# Patient Record
Sex: FEMALE | Race: WHITE | NOT HISPANIC OR LATINO | Employment: OTHER | URBAN - METROPOLITAN AREA
[De-identification: names, ages, dates, MRNs, and addresses within clinical notes are randomized per-mention and may not be internally consistent; named-entity substitution may affect disease eponyms.]

---

## 2017-01-06 ENCOUNTER — TELEPHONE (OUTPATIENT)
Dept: FAMILY MEDICINE | Facility: CLINIC | Age: 77
End: 2017-01-06

## 2017-01-06 NOTE — TELEPHONE ENCOUNTER
----- Message from Lazara Hurst sent at 1/6/2017  9:09 AM CST -----  Has question about osteoporosis med's.

## 2017-01-25 PROBLEM — H25.12 AGE-RELATED NUCLEAR CATARACT OF LEFT EYE: Status: ACTIVE | Noted: 2017-01-25

## 2017-01-26 PROBLEM — H25.12 AGE-RELATED NUCLEAR CATARACT OF LEFT EYE: Status: RESOLVED | Noted: 2017-01-25 | Resolved: 2017-01-26

## 2017-08-18 ENCOUNTER — OFFICE VISIT (OUTPATIENT)
Dept: UROLOGY | Facility: CLINIC | Age: 77
End: 2017-08-18
Payer: MEDICARE

## 2017-08-18 VITALS
HEART RATE: 77 BPM | WEIGHT: 137 LBS | SYSTOLIC BLOOD PRESSURE: 137 MMHG | HEIGHT: 61 IN | BODY MASS INDEX: 25.86 KG/M2 | DIASTOLIC BLOOD PRESSURE: 71 MMHG

## 2017-08-18 DIAGNOSIS — N36.2 URETHRAL CARUNCLE: ICD-10-CM

## 2017-08-18 DIAGNOSIS — N95.2 ATROPHIC VAGINITIS: ICD-10-CM

## 2017-08-18 DIAGNOSIS — R35.1 NOCTURIA: ICD-10-CM

## 2017-08-18 PROCEDURE — 1159F MED LIST DOCD IN RCRD: CPT | Mod: ,,, | Performed by: UROLOGY

## 2017-08-18 PROCEDURE — 1126F AMNT PAIN NOTED NONE PRSNT: CPT | Mod: ,,, | Performed by: UROLOGY

## 2017-08-18 PROCEDURE — 99204 OFFICE O/P NEW MOD 45 MIN: CPT | Mod: S$PBB,,, | Performed by: UROLOGY

## 2017-08-18 PROCEDURE — 99213 OFFICE O/P EST LOW 20 MIN: CPT | Mod: PBBFAC,PN | Performed by: UROLOGY

## 2017-08-18 PROCEDURE — 99999 PR PBB SHADOW E&M-EST. PATIENT-LVL III: CPT | Mod: PBBFAC,,, | Performed by: UROLOGY

## 2017-08-18 RX ORDER — ESTRADIOL 0.1 MG/G
1 CREAM VAGINAL DAILY
Qty: 30 G | Refills: 11 | Status: SHIPPED | OUTPATIENT
Start: 2017-08-18 | End: 2018-03-01

## 2017-08-18 NOTE — PROGRESS NOTES
Subjective:       Patient ID: Joanne Daniel is a 77 y.o. female.    Chief Complaint: Other (urethra check)    Mrs. Daniel is a 77-year-old female with a history of a urethral lesion noted by her gynecologist.  The patient was referred by Dr. Labadie for evaluation and treatment of urethral caruncle.  The patient has no bleeding pain or obstruction with this lesion.  Patient has some urinary frequency and nocturia but no signs of infection or dysuria.      Other   This is a chronic (The patient was diagnosed with urethral caruncle and appears to have atrophic vaginitis possibly lichen sclerosis of the vestibule above the urethra.  This condition is most likely chronic and not acute.) problem. The current episode started more than 1 month ago. The problem occurs constantly. The problem has been unchanged. Associated symptoms include urinary symptoms. Pertinent negatives include no abdominal pain, anorexia, arthralgias, change in bowel habit, chest pain, chills, congestion, coughing, diaphoresis, fatigue, fever, headaches, joint swelling, myalgias, nausea, neck pain, numbness, rash, sore throat, swollen glands, vertigo, visual change, vomiting or weakness. Associated symptoms comments: The patient has mild dyspareunia secondary to vaginal dryness and atrophic vaginitis and uses vaginal lubricants for intercourse.. The symptoms are aggravated by intercourse. Treatments tried: Vaginal lubricants. The treatment provided moderate relief.     Review of Systems   Constitutional: Negative for activity change, appetite change, chills, diaphoresis, fatigue and fever.   HENT: Negative for congestion, ear pain, hearing loss, nosebleeds, sinus pressure, sore throat and trouble swallowing.    Eyes: Negative for pain and visual disturbance.   Respiratory: Negative for apnea, cough and shortness of breath.    Cardiovascular: Negative for chest pain and leg swelling.   Gastrointestinal: Negative for abdominal distention, abdominal  pain, anal bleeding, anorexia, blood in stool, change in bowel habit, constipation, diarrhea, nausea, rectal pain and vomiting.   Endocrine: Negative for cold intolerance, heat intolerance, polydipsia, polyphagia and polyuria.   Genitourinary: Positive for dyspareunia and frequency. Negative for decreased urine volume, difficulty urinating, dysuria, enuresis, flank pain, genital sores, hematuria, menstrual problem, pelvic pain, urgency, vaginal bleeding, vaginal discharge and vaginal pain.   Musculoskeletal: Negative for arthralgias, back pain, joint swelling, myalgias and neck pain.   Skin: Negative for color change, pallor and rash.   Allergic/Immunologic: Negative for environmental allergies, food allergies and immunocompromised state.   Neurological: Negative for dizziness, vertigo, speech difficulty, weakness, numbness and headaches.   Hematological: Negative for adenopathy. Does not bruise/bleed easily.   Psychiatric/Behavioral: Negative.        Objective:      Physical Exam   Nursing note and vitals reviewed.  Constitutional: She is oriented to person, place, and time. She appears well-developed and well-nourished.   HENT:   Head: Normocephalic.   Nose: Nose normal.   Mouth/Throat: Oropharynx is clear and moist.   Eyes: Conjunctivae and EOM are normal. Pupils are equal, round, and reactive to light.   Neck: Normal range of motion. Neck supple.   Cardiovascular: Normal rate, regular rhythm, normal heart sounds and intact distal pulses.    Pulmonary/Chest: Effort normal and breath sounds normal.   Abdominal: Soft. Bowel sounds are normal.   Genitourinary:   Genitourinary Comments: Vaginal atrophy and urethral caruncle.   Musculoskeletal: Normal range of motion.   Neurological: She is alert and oriented to person, place, and time. She has normal reflexes.   Skin: Skin is warm and dry.     Psychiatric: She has a normal mood and affect. Her behavior is normal. Judgment and thought content normal.       Assessment:        1. Urethral caruncle    2. Atrophic vaginitis    3. Nocturia        Plan:       Patient Instructions   Estrace cream to the vaginal vestibule and urethral carbuncle as well as small amount in the introitus nightly for 2 weeks and used every third night for 3 months.  Follow-up in 3 months for evaluation of therapy.  If patient develops bleeding pain signs of infection or urinary obstruction will need to return to the clinic for possible surgical intervention.

## 2017-08-18 NOTE — PATIENT INSTRUCTIONS
Estrace cream to the vaginal vestibule and urethral carbuncle as well as small amount in the introitus nightly for 2 weeks and used every third night for 3 months.  Follow-up in 3 months for evaluation of therapy.  If patient develops bleeding pain signs of infection or urinary obstruction will need to return to the clinic for possible surgical intervention.

## 2017-12-15 ENCOUNTER — OFFICE VISIT (OUTPATIENT)
Dept: UROLOGY | Facility: CLINIC | Age: 77
End: 2017-12-15
Payer: MEDICARE

## 2017-12-15 VITALS — WEIGHT: 137 LBS | BODY MASS INDEX: 25.86 KG/M2 | HEIGHT: 61 IN

## 2017-12-15 DIAGNOSIS — N95.2 ATROPHIC VAGINITIS: ICD-10-CM

## 2017-12-15 DIAGNOSIS — N36.2 URETHRAL CARUNCLE: Primary | ICD-10-CM

## 2017-12-15 DIAGNOSIS — R35.1 NOCTURIA: ICD-10-CM

## 2017-12-15 PROCEDURE — 99999 PR PBB SHADOW E&M-EST. PATIENT-LVL II: CPT | Mod: PBBFAC,,, | Performed by: UROLOGY

## 2017-12-15 PROCEDURE — 99212 OFFICE O/P EST SF 10 MIN: CPT | Mod: PBBFAC,PO | Performed by: UROLOGY

## 2017-12-15 PROCEDURE — 99213 OFFICE O/P EST LOW 20 MIN: CPT | Mod: S$PBB,,, | Performed by: UROLOGY

## 2017-12-15 NOTE — PROGRESS NOTES
Subjective:       Patient ID: Joanne Daniel is a 77 y.o. female.    Chief Complaint: Follow-up    78 yo WF with atrophic vaginitis, Urethral caruncle and nocturia. Doing well with vaginal estrogen. No Bleeding.      Other   This is a chronic (Atrophic Vaginitis.) problem. The current episode started more than 1 year ago. The problem occurs constantly. The problem has been gradually improving. Associated symptoms include urinary symptoms. Pertinent negatives include no abdominal pain, anorexia, arthralgias, change in bowel habit, chest pain, chills, congestion, coughing, diaphoresis, fatigue, fever, headaches, joint swelling, myalgias, nausea, neck pain, numbness, rash, sore throat, swollen glands, vertigo (nocturia x 1, frequency x 4), visual change, vomiting or weakness. Nothing aggravates the symptoms. Treatments tried: Estrogen cream. The treatment provided significant relief.     Review of Systems   Constitutional: Negative for activity change, appetite change, chills, diaphoresis, fatigue and fever.   HENT: Negative for congestion, ear pain, hearing loss, nosebleeds, sinus pressure, sore throat and trouble swallowing.    Eyes: Negative for pain and visual disturbance.   Respiratory: Negative for apnea, cough and shortness of breath.    Cardiovascular: Negative for chest pain and leg swelling.   Gastrointestinal: Negative for abdominal distention, abdominal pain, anal bleeding, anorexia, blood in stool, change in bowel habit, constipation, diarrhea, nausea, rectal pain and vomiting.   Endocrine: Negative for cold intolerance, heat intolerance, polydipsia, polyphagia and polyuria.   Genitourinary: Negative for decreased urine volume, difficulty urinating, dyspareunia, dysuria, enuresis, flank pain, frequency, genital sores, hematuria, menstrual problem, pelvic pain, urgency, vaginal bleeding, vaginal discharge and vaginal pain.   Musculoskeletal: Negative for arthralgias, back pain, joint swelling, myalgias and  neck pain.   Skin: Negative for color change, pallor and rash.   Allergic/Immunologic: Negative for environmental allergies, food allergies and immunocompromised state.   Neurological: Negative for dizziness, vertigo (nocturia x 1, frequency x 4), speech difficulty, weakness, numbness and headaches.   Hematological: Negative for adenopathy. Does not bruise/bleed easily.   Psychiatric/Behavioral: Negative.        Objective:      Physical Exam   Nursing note and vitals reviewed.  Constitutional: She is oriented to person, place, and time. She appears well-developed and well-nourished.   HENT:   Head: Normocephalic.   Nose: Nose normal.   Mouth/Throat: Oropharynx is clear and moist.   Eyes: Conjunctivae and EOM are normal. Pupils are equal, round, and reactive to light.   Neck: Normal range of motion. Neck supple.   Cardiovascular: Normal rate, regular rhythm, normal heart sounds and intact distal pulses.    Pulmonary/Chest: Effort normal and breath sounds normal.   Abdominal: Soft. Bowel sounds are normal.   Genitourinary:   Genitourinary Comments: Atrophic vaginitis improving and caruncle ~ 40% smaller   Musculoskeletal: Normal range of motion.   Neurological: She is alert and oriented to person, place, and time. She has normal reflexes.   Skin: Skin is warm and dry.     Psychiatric: She has a normal mood and affect. Her behavior is normal. Judgment and thought content normal.       Assessment:       1. Urethral caruncle    2. Nocturia    3. Atrophic vaginitis        Plan:       Patient Instructions   Continue Estrogen twice weekly to affected area.  F/U yearly and as needed

## 2018-01-16 ENCOUNTER — TELEPHONE (OUTPATIENT)
Dept: UROLOGY | Facility: CLINIC | Age: 78
End: 2018-01-16

## 2018-01-16 NOTE — TELEPHONE ENCOUNTER
----- Message from Naima Duff sent at 1/16/2018  9:58 AM CST -----  Contact: [phone]/self  Patient requesting to speak with you regarding the side effects of medication estradiol (ESTRACE) 0.01 % (0.1 mg/gram) vaginal cream. Please advise.

## 2018-01-16 NOTE — TELEPHONE ENCOUNTER
She has some side effects from the estrace cream and had to stop it. Would like to know if there is anything else for her to try

## 2018-03-01 ENCOUNTER — OFFICE VISIT (OUTPATIENT)
Dept: FAMILY MEDICINE | Facility: CLINIC | Age: 78
End: 2018-03-01
Payer: MEDICARE

## 2018-03-01 VITALS
OXYGEN SATURATION: 98 % | BODY MASS INDEX: 26.83 KG/M2 | HEIGHT: 61 IN | DIASTOLIC BLOOD PRESSURE: 70 MMHG | WEIGHT: 142.13 LBS | SYSTOLIC BLOOD PRESSURE: 134 MMHG | HEART RATE: 70 BPM

## 2018-03-01 DIAGNOSIS — N95.2 ATROPHIC VAGINITIS: ICD-10-CM

## 2018-03-01 DIAGNOSIS — N28.1 RENAL CYST: Primary | ICD-10-CM

## 2018-03-01 DIAGNOSIS — M21.372 LEFT FOOT DROP: ICD-10-CM

## 2018-03-01 PROBLEM — R35.1 NOCTURIA: Status: RESOLVED | Noted: 2017-08-18 | Resolved: 2018-03-01

## 2018-03-01 PROCEDURE — 99214 OFFICE O/P EST MOD 30 MIN: CPT | Mod: S$PBB,,,

## 2018-03-01 PROCEDURE — 99213 OFFICE O/P EST LOW 20 MIN: CPT | Mod: PBBFAC,PO

## 2018-03-01 PROCEDURE — 99999 PR PBB SHADOW E&M-EST. PATIENT-LVL III: CPT | Mod: PBBFAC,,,

## 2018-03-01 NOTE — PROGRESS NOTES
Subjective:       Patient ID: Joanne Daniel is a 77 y.o. female.    Chief Complaint: Flank Pain and Abdominal Pain    HPI Since January of 2017 she has had an MRI of the spine, EMG and Lumbar puncture at [facility] by a neurologist and PMR physician due to a foot drop. She has had a normal mammogram. She was evaluated by Dr. Jon who prescribed estrace for atrophic vaginitis. She had side effects and now is using vagisil. Her gyn told her she had a hernia. The patient is complaining of hardening of her abdomen. She has daily bowel movements which are mostly normal. Normal appetite. She was told she had a renal cyst discovered on an MRI performed for evaluation of her foot drop. She doesn't know what kidney if involved. 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      Review of Systems   Constitutional: Negative.  Negative for activity change, appetite change, chills, diaphoresis and fever.   HENT: Negative.  Negative for congestion, trouble swallowing and voice change.    Eyes: Negative.  Negative for visual disturbance.   Respiratory: Negative.    Cardiovascular: Negative.    Gastrointestinal: Positive for abdominal distention and abdominal pain. Negative for constipation, diarrhea, nausea and vomiting.        Right sided abdominal symptoms    Endocrine: Negative.    Genitourinary: Positive for flank pain. Negative for difficulty urinating, dyspareunia, dysuria, frequency, hematuria, urgency and vaginal pain.   Skin: Negative.    Allergic/Immunologic: Negative.    Neurological: Negative.         Foot drop    Hematological: Negative.    Psychiatric/Behavioral: Negative.    All other systems reviewed and are negative.      Objective:      Vitals:    03/01/18 0801   BP: 134/70   Pulse: 70     Physical Exam   Constitutional: She is oriented to person, place, and time. She appears well-developed and well-nourished. She is active.  Non-toxic appearance. She  does not have a sickly appearance. She does not appear ill. No distress.   Using a cane    HENT:   Head: Normocephalic and atraumatic.   Right Ear: External ear normal.   Left Ear: External ear normal.   Nose: Nose normal.   Hearing normal.    Eyes: Conjunctivae are normal. Pupils are equal, round, and reactive to light.   Neck: Normal range of motion. Neck supple. No thyroid mass and no thyromegaly present.   Cardiovascular: Normal rate, regular rhythm, normal heart sounds and intact distal pulses.  Exam reveals no gallop and no friction rub.    No murmur heard.  Pulses:       Dorsalis pedis pulses are 2+ on the right side, and 2+ on the left side.        Posterior tibial pulses are 2+ on the right side, and 2+ on the left side.   Pulmonary/Chest: Effort normal and breath sounds normal. No respiratory distress. She exhibits no tenderness.   Abdominal: Soft. She exhibits no distension and no mass. There is no tenderness. No hernia.   Musculoskeletal: Normal range of motion. She exhibits no edema.   Lymphadenopathy:     She has no cervical adenopathy.   Neurological: She is alert and oriented to person, place, and time. She has normal strength. Coordination and gait normal.   Skin: Skin is warm and dry. She is not diaphoretic. No pallor.   Psychiatric: She has a normal mood and affect. Her speech is normal and behavior is normal. Judgment and thought content normal. Cognition and memory are normal.   Vitals reviewed.      Assessment:       1. Renal cyst    2. Left foot drop    3. Atrophic vaginitis        Plan:       Renal cyst  -     US Retroperitoneal Complete (Kidney and; Future; Expected date: 03/01/2018    Left foot drop    Atrophic vaginitis      Follow-up if symptoms worsen or fail to improve.      I offered to obtain her records and arrange for a second opinion and she will think about this.

## 2018-03-07 ENCOUNTER — TELEPHONE (OUTPATIENT)
Dept: FAMILY MEDICINE | Facility: CLINIC | Age: 78
End: 2018-03-07

## 2018-03-07 NOTE — TELEPHONE ENCOUNTER
Notified pt. She will call if her problem worsen or fails to improve. She does not want to see specialist at this time. All questions answered.

## 2018-07-25 ENCOUNTER — HOSPITAL ENCOUNTER (OUTPATIENT)
Dept: RADIOLOGY | Facility: HOSPITAL | Age: 78
Discharge: HOME | End: 2018-07-25
Attending: ORTHOPAEDIC SURGERY
Payer: MEDICARE

## 2018-07-25 DIAGNOSIS — M79.671 BILATERAL FOOT PAIN: ICD-10-CM

## 2018-07-25 DIAGNOSIS — M79.672 BILATERAL FOOT PAIN: ICD-10-CM

## 2018-07-25 DIAGNOSIS — M25.562 BILATERAL KNEE PAIN: ICD-10-CM

## 2018-07-25 DIAGNOSIS — M25.561 BILATERAL KNEE PAIN: ICD-10-CM

## 2018-07-25 PROCEDURE — 73564 X-RAY EXAM KNEE 4 OR MORE: CPT | Mod: 26,50,, | Performed by: RADIOLOGY

## 2018-07-25 PROCEDURE — 73630 X-RAY EXAM OF FOOT: CPT | Mod: 50,TC

## 2018-07-25 PROCEDURE — 73630 X-RAY EXAM OF FOOT: CPT | Mod: 26,50,, | Performed by: RADIOLOGY

## 2018-07-25 PROCEDURE — 73564 X-RAY EXAM KNEE 4 OR MORE: CPT | Mod: 50,TC

## 2018-09-14 ENCOUNTER — OFFICE VISIT (OUTPATIENT)
Dept: FAMILY MEDICINE | Facility: CLINIC | Age: 78
End: 2018-09-14
Payer: MEDICARE

## 2018-09-14 VITALS
HEART RATE: 68 BPM | HEIGHT: 61 IN | WEIGHT: 136.19 LBS | SYSTOLIC BLOOD PRESSURE: 118 MMHG | DIASTOLIC BLOOD PRESSURE: 66 MMHG | TEMPERATURE: 98 F | BODY MASS INDEX: 25.71 KG/M2

## 2018-09-14 DIAGNOSIS — M15.9 PRIMARY OSTEOARTHRITIS INVOLVING MULTIPLE JOINTS: ICD-10-CM

## 2018-09-14 DIAGNOSIS — R53.83 FATIGUE, UNSPECIFIED TYPE: ICD-10-CM

## 2018-09-14 DIAGNOSIS — R09.81 SINUS CONGESTION: ICD-10-CM

## 2018-09-14 DIAGNOSIS — H92.09 EAR ACHE: Primary | ICD-10-CM

## 2018-09-14 DIAGNOSIS — E78.00 HYPERCHOLESTEROLEMIA: ICD-10-CM

## 2018-09-14 PROBLEM — M15.0 PRIMARY OSTEOARTHRITIS INVOLVING MULTIPLE JOINTS: Status: ACTIVE | Noted: 2018-09-14

## 2018-09-14 PROCEDURE — 99999 PR PBB SHADOW E&M-EST. PATIENT-LVL III: CPT | Mod: PBBFAC,,, | Performed by: INTERNAL MEDICINE

## 2018-09-14 PROCEDURE — 99214 OFFICE O/P EST MOD 30 MIN: CPT | Mod: S$PBB,,, | Performed by: INTERNAL MEDICINE

## 2018-09-14 PROCEDURE — 99213 OFFICE O/P EST LOW 20 MIN: CPT | Mod: PBBFAC,PN,25 | Performed by: INTERNAL MEDICINE

## 2018-09-14 PROCEDURE — 96372 THER/PROPH/DIAG INJ SC/IM: CPT | Mod: PBBFAC,PN

## 2018-09-14 RX ORDER — TRIAMCINOLONE ACETONIDE 40 MG/ML
40 INJECTION, SUSPENSION INTRA-ARTICULAR; INTRAMUSCULAR
Status: COMPLETED | OUTPATIENT
Start: 2018-09-14 | End: 2018-09-14

## 2018-09-14 RX ADMIN — TRIAMCINOLONE ACETONIDE 40 MG: 40 INJECTION, SUSPENSION INTRA-ARTICULAR; INTRAMUSCULAR at 10:09

## 2018-09-14 NOTE — PATIENT INSTRUCTIONS
We have reviewed your prescription medications.  I have ordered some labs to investigate your fatigue. I have ordered a steroid shot for sinus congestion, ear pain and joint stiffness. You have been given a steroid shot to help manage your symptoms. Possible side effects of this medication are sleeplessness, irritability, and increased hunger.

## 2018-09-17 NOTE — PROGRESS NOTES
Subjective:       Patient ID: Joanne Daniel is a 78 y.o. female.    Chief Complaint: Otalgia; Sore Throat; Jaw Pain (hurts to chew); Headache (lateral ); and Fatigue     I have reviewed the PMH,  and  for this patient. This is a new patient to me. This patient is a former patient of Dr. Issa who is here to establish care. She is here because of earache , sinus symptoms, and fatigue. She has been having sinus symptoms for a few weeksShe has no drainage but the back of her head hurts and she feels congested. She has not had labs sine 2016. She has been taking otc supplements for instaflex and vitamin B12.       Otalgia    There is pain in both ears. This is a chronic problem. The current episode started 1 to 4 weeks ago. The problem occurs constantly. The problem has been gradually worsening. There has been no fever. The pain is moderate. Associated symptoms include headaches, neck pain and rhinorrhea. Pertinent negatives include no abdominal pain, coughing, diarrhea, ear discharge, hearing loss, rash, sore throat or vomiting. Treatments tried: herbal supplements. The treatment provided mild relief.     Review of Systems   Constitutional: Negative for chills, fatigue and fever.   HENT: Positive for ear pain and rhinorrhea. Negative for congestion, ear discharge, hearing loss and sore throat.    Eyes: Negative for discharge, redness and itching.   Respiratory: Negative for cough, chest tightness, shortness of breath and wheezing.    Cardiovascular: Negative for chest pain, palpitations and leg swelling.   Gastrointestinal: Negative for abdominal pain, constipation, diarrhea, nausea and vomiting.   Endocrine: Negative for cold intolerance and heat intolerance.   Genitourinary: Negative for dysuria, flank pain, frequency and hematuria.   Musculoskeletal: Positive for neck pain. Negative for arthralgias, back pain, joint swelling and myalgias.   Skin: Negative for color change and rash.   Neurological: Positive  for headaches. Negative for dizziness, tremors and numbness.   Psychiatric/Behavioral: Negative for dysphoric mood and sleep disturbance. The patient is not nervous/anxious.        Objective:      Physical Exam   Constitutional: She appears well-developed and well-nourished.   HENT:   Head: Normocephalic and atraumatic.   Right Ear: External ear normal. Tympanic membrane is not erythematous. A middle ear effusion is present.   Left Ear: External ear normal. Tympanic membrane is not erythematous. A middle ear effusion is present.   Mouth/Throat: No posterior oropharyngeal edema or posterior oropharyngeal erythema. No tonsillar exudate.   Eyes: Conjunctivae and EOM are normal. Pupils are equal, round, and reactive to light.   Neck: No thyromegaly present.   Cardiovascular: Normal rate, regular rhythm, normal heart sounds and intact distal pulses.   No murmur heard.  Pulmonary/Chest: Effort normal and breath sounds normal. She has no wheezes.   Abdominal: She exhibits no distension. There is no tenderness.   Musculoskeletal: She exhibits no edema or tenderness.   Lymphadenopathy:     She has no cervical adenopathy.   Neurological: She displays normal reflexes. No cranial nerve deficit.   Skin: Skin is warm and dry. No rash noted.   Psychiatric: She has a normal mood and affect. Her behavior is normal.       Assessment and Plan:     Problem List Items Addressed This Visit     Hypercholesterolemia - She has not had cholesterol checked in years. We will check lipids.     Relevant Orders    Lipid panel (Completed)    Primary osteoarthritis involving multiple joints - steroid shot should help      Other Visit Diagnoses     Ear ache    -  Primary - probably due to congestion. Treat with steroid shot    Relevant Orders    CBC auto differential (Completed)    Sinus congestion    - treat with steroid shot.       Fatigue, unspecified type     - check labs.     Relevant Orders    Comprehensive metabolic panel (Completed)    TSH  (Completed)

## 2018-09-19 ENCOUNTER — TELEPHONE (OUTPATIENT)
Dept: FAMILY MEDICINE | Facility: CLINIC | Age: 78
End: 2018-09-19

## 2018-09-19 DIAGNOSIS — D75.839 THROMBOCYTOSIS: Primary | ICD-10-CM

## 2018-09-19 NOTE — TELEPHONE ENCOUNTER
----- Message from Sully Flowers sent at 9/19/2018  2:36 PM CDT -----  Contact: [phone]/self  Patient is calling to speak with you concerning lab results. Please call and advise.

## 2018-09-20 NOTE — TELEPHONE ENCOUNTER
Return call to pt no answer:  TSH was high, but free T4 was normal. We should repeat this test in about 3 months. Make appointment for follow-up. Cholesterol was a little high. Total was 203 and LDL was 132. Hdl was 52. The blood chemistries, kidney function test  and liver function tests were within normal range.CBC shows Hct is a little low at 37 and the platelets are very high at 513. I recommend that we refer her to hematology about high platelets. I will place order for referral.  Will try back tomorrow

## 2018-09-21 ENCOUNTER — TELEPHONE (OUTPATIENT)
Dept: FAMILY MEDICINE | Facility: CLINIC | Age: 78
End: 2018-09-21

## 2018-09-21 NOTE — TELEPHONE ENCOUNTER
----- Message from Madeleine Hahn MD sent at 9/19/2018  5:20 PM CDT -----  TSH was high, but free T4 was normal. We should repeat this test in about 3 months. Make appointment for follow-up. Cholesterol was a little high. Total was 203 and LDL was 132. Hdl was 52. The blood chemistries, kidney function test  and liver function tests were within normal range.CBC shows Hct is a little low at 37 and the platelets are very high at 513. I recommend that we refer her to hematology about high platelets. I will place order for referral.

## 2018-09-21 NOTE — TELEPHONE ENCOUNTER
----- Message from Latosha Nelson sent at 9/21/2018  1:33 PM CDT -----  Contact: self, [phone] (H)  Patient states her platelets were high and wants to know if she should be taking a baby aspirin.

## 2018-09-26 ENCOUNTER — TELEPHONE (OUTPATIENT)
Dept: FAMILY MEDICINE | Facility: CLINIC | Age: 78
End: 2018-09-26

## 2018-10-02 ENCOUNTER — OFFICE VISIT (OUTPATIENT)
Dept: HEMATOLOGY/ONCOLOGY | Facility: CLINIC | Age: 78
End: 2018-10-02
Payer: MEDICARE

## 2018-10-02 ENCOUNTER — LAB VISIT (OUTPATIENT)
Dept: LAB | Facility: HOSPITAL | Age: 78
End: 2018-10-02
Attending: INTERNAL MEDICINE
Payer: MEDICARE

## 2018-10-02 VITALS
HEIGHT: 61 IN | RESPIRATION RATE: 18 BRPM | HEART RATE: 75 BPM | OXYGEN SATURATION: 98 % | SYSTOLIC BLOOD PRESSURE: 161 MMHG | TEMPERATURE: 98 F | WEIGHT: 134.94 LBS | DIASTOLIC BLOOD PRESSURE: 71 MMHG | BODY MASS INDEX: 25.48 KG/M2

## 2018-10-02 DIAGNOSIS — D53.9 NUTRITIONAL ANEMIA: ICD-10-CM

## 2018-10-02 DIAGNOSIS — D64.9 NORMOCYTIC ANEMIA: ICD-10-CM

## 2018-10-02 DIAGNOSIS — D75.839 THROMBOCYTOSIS: ICD-10-CM

## 2018-10-02 DIAGNOSIS — D75.839 THROMBOCYTOSIS: Primary | ICD-10-CM

## 2018-10-02 LAB
BASOPHILS # BLD AUTO: 0.03 K/UL
BASOPHILS NFR BLD: 0.3 %
CRP SERPL-MCNC: 30.1 MG/L
DIFFERENTIAL METHOD: ABNORMAL
EOSINOPHIL # BLD AUTO: 0.2 K/UL
EOSINOPHIL NFR BLD: 2 %
ERYTHROCYTE [DISTWIDTH] IN BLOOD BY AUTOMATED COUNT: 13.1 %
ERYTHROCYTE [SEDIMENTATION RATE] IN BLOOD BY WESTERGREN METHOD: 115 MM/HR
FERRITIN SERPL-MCNC: 126 NG/ML
FOLATE SERPL-MCNC: 9 NG/ML
HCT VFR BLD AUTO: 37.4 %
HGB BLD-MCNC: 11.5 G/DL
IRON SERPL-MCNC: 38 UG/DL
LYMPHOCYTES # BLD AUTO: 2.8 K/UL
LYMPHOCYTES NFR BLD: 31.2 %
MCH RBC QN AUTO: 28.3 PG
MCHC RBC AUTO-ENTMCNC: 30.7 G/DL
MCV RBC AUTO: 92 FL
MONOCYTES # BLD AUTO: 0.8 K/UL
MONOCYTES NFR BLD: 9.1 %
NEUTROPHILS # BLD AUTO: 5.1 K/UL
NEUTROPHILS NFR BLD: 57.4 %
PATH REV BLD -IMP: NORMAL
PLATELET # BLD AUTO: 476 K/UL
PMV BLD AUTO: 9.3 FL
RBC # BLD AUTO: 4.06 M/UL
SATURATED IRON: 11 %
TOTAL IRON BINDING CAPACITY: 339 UG/DL
TRANSFERRIN SERPL-MCNC: 229 MG/DL
VIT B12 SERPL-MCNC: 1905 PG/ML
WBC # BLD AUTO: 8.91 K/UL

## 2018-10-02 PROCEDURE — 82728 ASSAY OF FERRITIN: CPT

## 2018-10-02 PROCEDURE — 85060 BLOOD SMEAR INTERPRETATION: CPT | Mod: ,,, | Performed by: PATHOLOGY

## 2018-10-02 PROCEDURE — 81219 CALR GENE COM VARIANTS: CPT

## 2018-10-02 PROCEDURE — 85652 RBC SED RATE AUTOMATED: CPT

## 2018-10-02 PROCEDURE — 36415 COLL VENOUS BLD VENIPUNCTURE: CPT

## 2018-10-02 PROCEDURE — 86140 C-REACTIVE PROTEIN: CPT

## 2018-10-02 PROCEDURE — 81270 JAK2 GENE: CPT

## 2018-10-02 PROCEDURE — 99215 OFFICE O/P EST HI 40 MIN: CPT | Mod: S$PBB,ICN,, | Performed by: INTERNAL MEDICINE

## 2018-10-02 PROCEDURE — 88271 CYTOGENETICS DNA PROBE: CPT

## 2018-10-02 PROCEDURE — 82746 ASSAY OF FOLIC ACID SERUM: CPT

## 2018-10-02 PROCEDURE — 85025 COMPLETE CBC W/AUTO DIFF WBC: CPT

## 2018-10-02 PROCEDURE — 99213 OFFICE O/P EST LOW 20 MIN: CPT | Mod: PBBFAC,PO | Performed by: INTERNAL MEDICINE

## 2018-10-02 PROCEDURE — 99999 PR PBB SHADOW E&M-EST. PATIENT-LVL III: CPT | Mod: PBBFAC,,, | Performed by: INTERNAL MEDICINE

## 2018-10-02 PROCEDURE — 81403 MOPATH PROCEDURE LEVEL 4: CPT

## 2018-10-02 PROCEDURE — 83540 ASSAY OF IRON: CPT

## 2018-10-02 PROCEDURE — 82607 VITAMIN B-12: CPT

## 2018-10-02 PROCEDURE — 88275 CYTOGENETICS 100-300: CPT

## 2018-10-02 NOTE — PROGRESS NOTES
PATIENT: Joanne Daniel  MRN: [id number]  DATE: 10/1/2018    Diagnosis:   1. Thrombocytosis    2. Normocytic anemia    3. Nutritional anemia       Chief Complaint: thrombocytosis    Subjective:    History of Present Illness: Ms. Daniel is a 78 y.o. female who presents for evaluation and management of thrombocytosis. She is referred by her family medicine physician Dr. Hahn. Routine labs on 9/15/18 revealed a mild normocytic anemia at 11.5 g/dL and an elevated platelet count at 513 k/uL. Her platelet count in November 2016 was normal at 321 k/uL.    Patient denies a history of thrombosis, vasomotor symptoms, or constitutional symptoms (unintentional weight loss, fatigue). She takes several supplements, including turmeric and gingko. She denies a history of cancer. In 2016, interestingly, she saw hematologist/oncologist Dr. Powers for a cancer workup evaluation. The workup was negative, but labwork revealed an elevated erythrocyte sedimentation rate.     She denies shortness of breath, chest pain, nausea, vomiting, diarrhea, constipation.    Past medical, surgical, family, and social histories have been reviewed and updated below.    Past Medical History:   Past Medical History:   Diagnosis Date    Foot drop, left foot     Glaucoma     Hyperlipidemia     Urinary tract infection     Vaginal infection        Past Surgical History:   Past Surgical History:   Procedure Laterality Date    EYE SURGERY Right     PHACOEMULSIFICATION-ASPIRATION-CATARACT Left 1/26/2017    Performed by Chris Manrique MD at [facility] OR       Family History:   Family History   Problem Relation Age of Onset    Heart disease Neg Hx     Cancer Neg Hx     Kidney disease Neg Hx        Social History:  reports that  has never smoked. she has never used smokeless tobacco. She reports that she does not drink alcohol or use drugs.    Allergies:  Review of patient's allergies indicates:  No Known Allergies    Medications:  Current Outpatient  "Medications   Medication Sig Dispense Refill    calcium-mag oxide-vitamin D3 185- mg-mg-unit Cap Take by mouth 3 (three) times daily.      thiamine (VITAMIN B-1) 100 MG tablet Take 100 mg by mouth once daily.       No current facility-administered medications for this visit.      Review of Systems   Constitutional: Negative for fatigue, fever and unexpected weight change.   HENT: Negative for sore throat.    Eyes: Negative for visual disturbance.   Respiratory: Negative for shortness of breath.    Cardiovascular: Negative for chest pain and leg swelling.   Gastrointestinal: Negative for abdominal distention, abdominal pain, constipation, diarrhea, nausea and vomiting.   Genitourinary: Negative for dysuria.   Musculoskeletal: Negative for back pain.   Skin: Negative for rash.   Neurological: Negative for headaches.   Hematological: Negative for adenopathy.   Psychiatric/Behavioral: The patient is not nervous/anxious.      ECOG Performance Status:   ECOG SCORE 0       Objective:      Vitals:   Vitals:    10/02/18 1019   BP: (!) 161/71   BP Location: Right arm   Patient Position: Sitting   Pulse: 75   Resp: 18   Temp: 98.1 °F (36.7 °C)   TempSrc: Oral   SpO2: 98%   Weight: 61.2 kg (134 lb 14.7 oz)   Height: 5' 1" (1.549 m)     BMI: Body mass index is 25.49 kg/m².    Physical Exam   Constitutional: She is oriented to person, place, and time. She appears well-developed and well-nourished. No distress.   HENT:   Head: Normocephalic and atraumatic.   Eyes: EOM are normal. Pupils are equal, round, and reactive to light.   Neck: Normal range of motion. Neck supple.   Cardiovascular: Normal rate and regular rhythm.   Pulmonary/Chest: Effort normal and breath sounds normal.   Abdominal: Soft. Bowel sounds are normal.   No splenomegaly appreciated on exam   Musculoskeletal: Normal range of motion. She exhibits no edema.   Neurological: She is alert and oriented to person, place, and time.   Skin: Skin is warm and dry. "   Psychiatric: She has a normal mood and affect. Her behavior is normal. Judgment and thought content normal.   Nursing note and vitals reviewed.    Laboratory Data:  Labs have been reviewed.          Assessment:       1. Thrombocytosis    2. Normocytic anemia    3. Nutritional anemia          Plan:     1. Thrombocytosis  - patient has one documented elevated platelet count. Unclear at this time what the exact etiology of her thrombocytosis is; however, I suspected that she has a secondary, reactive thrombocytosis. She had an elevated inflammatory marker (ESR) in 2016, so perhaps she has reactive thrombocytosis related to inflammation.  - I will check the following labs today: CBC, pathologist interpretation differential, ESR, CRP (to evaluate for inflammatory causes of thrombocytosis), ferritin, iron/TIBC (to evaluate for iron deficiency as a cause of thrombocytosis), BCR/ABL (to evaluate for chronic myeloid leukemia), JAK2 V617F with reflex testing (to evaluate for essential thrombocytosis)  - I will call her as the labs come back. The JAK2 and BCR/ABL testing will take a week or so to come back.    2. Normocytic anemia  - she has a mild normocytic anemia. I will evaluate for iron deficiency (which can cause a reactive thrombocytosis), anemia of chronic inflammation, and nutritional anemias (vitamin B12 and folate deficiency).    - return to clinic in 3 weeks to review all the results and discuss further actions.    Toney Mora M.D.  Hematology/Oncology  [facility] - [address], [address]  [place] [address]  Phone: [phone]  Fax: [phone]

## 2018-10-02 NOTE — LETTER
October 2, 2018      Madeleine Hahn MD  [address] [facility]  [address] [place] [id number]           [facility]  [address] [place] [id number]  Phone: [phone]          Patient: Joanne Daniel   MR Number: [id number]   YOB: 1940   Date of Visit: 10/2/2018       Dear Dr. Madeleine Hahn:    Thank you for referring Joanne Daniel to me for evaluation. Attached you will find relevant portions of my assessment and plan of care.    If you have questions, please do not hesitate to call me. I look forward to following Joanne Daniel along with you.    Sincerely,    Toney Mora MD    Enclosure  CC:  No Recipients    If you would like to receive this communication electronically, please contact [email or web address] or [phone] to request more information on [employer] Link access.    For providers and/or their staff who would like to refer a patient to [facility], please contact us through our one-stop-shop provider referral line, Johnathan Terrazas, at [phone].    If you feel you have received this communication in error or would no longer like to receive these types of communications, please e-mail [email or web address]

## 2018-10-03 ENCOUNTER — TELEPHONE (OUTPATIENT)
Dept: HEMATOLOGY/ONCOLOGY | Facility: CLINIC | Age: 78
End: 2018-10-03

## 2018-10-03 LAB — PATH REV BLD -IMP: NORMAL

## 2018-10-03 NOTE — TELEPHONE ENCOUNTER
----- Message from Meagan Wilson sent at 10/3/2018  2:00 PM CDT -----  Contact: Self [phone]  Patient is calling to talk to nurse in regards to her test results.

## 2018-10-03 NOTE — TELEPHONE ENCOUNTER
I called Ms. Daniel. I left a voicemail. 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 I do not have a great answer as to why her ESR and CRP are this elevated. But, I think this explains her elevated platelet count and mild anemia.    I will await the results of BCR/ABL and JAK2 V617F (with reflex testing) labs. I will see her in clinic in a few weeks to go over all the results.    Toney Mora M.D.  Hematology/Oncology  [facility] - [address], [address]  [place] [address]  Phone: [phone]  Fax: [phone]

## 2018-10-03 NOTE — TELEPHONE ENCOUNTER
I called and gave her the results of her labs. She has chronic inflammation that is causing a secondary thrombocytosis and mild anemia of chronic inflammation.    The BCR/ABL and MPN testing are pending.     I recommended that she keep her follow-up appointment with me, so we can review all labs after they come back.    Patient is in agreement with the proposed treatment plan. All questions were answered to the patient's satisfaction.    Toney Mora M.D.  Hematology/Oncology  [facility] - [address], [address]  [place] [address]  Phone: [phone]  Fax: [phone]

## 2018-10-05 ENCOUNTER — OFFICE VISIT (OUTPATIENT)
Dept: FAMILY MEDICINE | Facility: CLINIC | Age: 78
End: 2018-10-05
Payer: MEDICARE

## 2018-10-05 VITALS
TEMPERATURE: 98 F | DIASTOLIC BLOOD PRESSURE: 78 MMHG | HEIGHT: 61 IN | SYSTOLIC BLOOD PRESSURE: 130 MMHG | WEIGHT: 134.81 LBS | BODY MASS INDEX: 25.45 KG/M2 | HEART RATE: 70 BPM | RESPIRATION RATE: 16 BRPM | OXYGEN SATURATION: 97 %

## 2018-10-05 DIAGNOSIS — R22.1 NECK MASS: Primary | ICD-10-CM

## 2018-10-05 DIAGNOSIS — E03.8 SUBCLINICAL HYPOTHYROIDISM: ICD-10-CM

## 2018-10-05 DIAGNOSIS — D50.9 IRON DEFICIENCY ANEMIA, UNSPECIFIED IRON DEFICIENCY ANEMIA TYPE: ICD-10-CM

## 2018-10-05 DIAGNOSIS — D75.839 THROMBOCYTOSIS: ICD-10-CM

## 2018-10-05 DIAGNOSIS — J01.90 ACUTE SINUSITIS, RECURRENCE NOT SPECIFIED, UNSPECIFIED LOCATION: ICD-10-CM

## 2018-10-05 PROCEDURE — 99214 OFFICE O/P EST MOD 30 MIN: CPT | Mod: PBBFAC,PN | Performed by: INTERNAL MEDICINE

## 2018-10-05 PROCEDURE — 99999 PR PBB SHADOW E&M-EST. PATIENT-LVL IV: CPT | Mod: PBBFAC,,, | Performed by: INTERNAL MEDICINE

## 2018-10-05 PROCEDURE — 99214 OFFICE O/P EST MOD 30 MIN: CPT | Mod: S$PBB,,, | Performed by: INTERNAL MEDICINE

## 2018-10-05 RX ORDER — AZITHROMYCIN 250 MG/1
250 TABLET, FILM COATED ORAL DAILY
Qty: 6 TABLET | Refills: 0 | Status: SHIPPED | OUTPATIENT
Start: 2018-10-05 | End: 2020-01-17

## 2018-10-05 RX ORDER — LEVOTHYROXINE SODIUM 25 UG/1
25 TABLET ORAL DAILY
Qty: 30 TABLET | Refills: 2 | Status: SHIPPED | OUTPATIENT
Start: 2018-10-05 | End: 2019-01-04 | Stop reason: SDUPTHER

## 2018-10-05 NOTE — PATIENT INSTRUCTIONS
We will get an ultrasound to evaluate the knots in your neck. We will start you on thyroid medicine because you are having symptoms of hypothyroidism and your tsh was elevated. Follow-up with hematology as recommended about platelets. We will treat you for a possible sinus infection also.

## 2018-10-08 LAB
CLINICAL CYTOGENETICIST REVIEW: NORMAL
MBCR DISCLAIMER: NORMAL
MBCR REASON FOR REFERRAL: NORMAL
MBCR RELEASED BY: NORMAL
MBCR RESULT SUMMARY: NORMAL
MBCR RESULT TABLE: NORMAL
MBCR SPECIMEN: NORMAL
REF LAB TEST METHOD: NORMAL
SERVICE CMNT-IMP: NORMAL
SPECIMEN SOURCE: NORMAL
T(9;22)(ABL1,BCR) BLD/T FISH: NORMAL

## 2018-10-08 NOTE — PROGRESS NOTES
Subjective:       Patient ID: Joanne Daniel is a 78 y.o. female.    Chief Complaint: Throat (lump in throat right side  when she swollow off and on x 4 weeks) and Ear (right ear slight pain off and on)     I have reviewed the PMH,  and  for this patient. She is here for follow-up of lumps on the side of her neck and sore throat. She was seen last week and treated with a steroid shot. She states that she felt better for a few days, but then she got worse again. Her daughter is here with her and assists with history. She had been having apain in her scalp and itching and these things got better. She has been seeing hematology about abnormal platelets. She reports that the soreness in her neck is related to swallowing. She has been having symptoms of fatigue and constipation. Her TSH was abnormal last time, but her free T4 was normal. She has been having LUQ pain.       Review of Systems   Constitutional: Negative for chills, fatigue and fever.   HENT: Positive for sore throat and trouble swallowing. Negative for congestion, ear discharge, ear pain, postnasal drip, rhinorrhea and sinus pressure.    Eyes: Negative for discharge, redness and itching.   Respiratory: Negative for cough, chest tightness, shortness of breath and wheezing.    Cardiovascular: Negative for chest pain, palpitations and leg swelling.   Gastrointestinal: Positive for abdominal pain. Negative for constipation, diarrhea, nausea and vomiting.   Endocrine: Negative for cold intolerance and heat intolerance.   Genitourinary: Negative for dysuria, flank pain, frequency and hematuria.   Musculoskeletal: Negative for arthralgias, back pain, joint swelling and myalgias.   Skin: Negative for color change and rash.   Neurological: Negative for dizziness, tremors, numbness and headaches.   Psychiatric/Behavioral: Negative for dysphoric mood and sleep disturbance. The patient is not nervous/anxious.        Objective:      Physical Exam   Constitutional:  She appears well-developed and well-nourished.   HENT:   Head: Normocephalic and atraumatic.   Right Ear: External ear normal. Tympanic membrane is not erythematous. A middle ear effusion is present.   Left Ear: External ear normal. Tympanic membrane is not erythematous.  No middle ear effusion.   Mouth/Throat: No posterior oropharyngeal edema or posterior oropharyngeal erythema. No tonsillar exudate.   Eyes: Conjunctivae and EOM are normal. Pupils are equal, round, and reactive to light.   Neck: No thyromegaly present.   Cardiovascular: Normal rate, regular rhythm, normal heart sounds and intact distal pulses.   No murmur heard.  Pulmonary/Chest: Effort normal and breath sounds normal. She has no wheezes.   Abdominal: She exhibits no distension. There is no tenderness.   Musculoskeletal: She exhibits no edema or tenderness.   Lymphadenopathy:     She has cervical adenopathy.   Neurological: She displays normal reflexes. No cranial nerve deficit.   Skin: Skin is warm and dry. No rash noted.   Psychiatric: She has a normal mood and affect. Her behavior is normal.       Assessment and Plan:     Problem List Items Addressed This Visit     Thrombocytosis - follow-up with hematology as scheduled. Stable.      Iron deficiency anemia - take iron pills otc.       Subclinical hypothyroidism - we will start her on low-dose synthroid for symptomatic subclinical hypothyroidism.       Neck mass - Primary - we will get thyroid ultrasound.     Relevant Orders    US Soft Tissue Head Neck Thyroid      Other Visit Diagnoses     Acute sinusitis, recurrence not specified, unspecified location    - treat with zpak.

## 2018-10-09 LAB
MPNR  FINAL DIAGNOSIS: NORMAL
MPNR  SPECIMEN TYPE: NORMAL
MPNR RESULT: NORMAL

## 2018-10-11 ENCOUNTER — TELEPHONE (OUTPATIENT)
Dept: FAMILY MEDICINE | Facility: CLINIC | Age: 78
End: 2018-10-11

## 2018-10-11 NOTE — TELEPHONE ENCOUNTER
She was treated with a zpak. You only take it for 5 days, but it works for 10 days. She should continue to see a benefit over the next week. She can gargle with salt water  Several times a day and take throat lozenges for sore throat. Her thyroid ultrasound did show 2 small nodules, but they were not large enough to biopsy. This should be repeated next year.

## 2018-10-11 NOTE — TELEPHONE ENCOUNTER
----- Message from Kelley Lua sent at 10/11/2018  1:06 PM CDT -----  Contact: [phone]  Patient requested to speak with the nurse about her throat pain. Please call.

## 2018-10-11 NOTE — TELEPHONE ENCOUNTER
"Pt was seen this past Friday, took five days of antibiotic, she still has a sore throat, offered pt to come in & she asked why "what will the doctor do for my throat", pt did not schedule an appt, and she wants her US results.   "

## 2018-10-11 NOTE — TELEPHONE ENCOUNTER
----- Message from Sully Flowers sent at 10/11/2018 10:01 AM CDT -----  Contact: [phone]/Self  Patient requesting to speak with you concerning throat difficulties and inability to swallow comfortably. Please call and advise

## 2018-10-16 ENCOUNTER — TELEPHONE (OUTPATIENT)
Dept: FAMILY MEDICINE | Facility: CLINIC | Age: 78
End: 2018-10-16

## 2018-10-16 NOTE — TELEPHONE ENCOUNTER
----- Message from Meagan Wilson sent at 10/16/2018 10:05 AM CDT -----  Contact: Self [phone]  Patient is calling to talk to nurse in regards to her appointment on 10/26/18. Please advice

## 2018-10-22 PROBLEM — D75.838 REACTIVE THROMBOCYTOSIS: Status: ACTIVE | Noted: 2018-10-02

## 2018-10-22 PROBLEM — D63.8 ANEMIA OF CHRONIC DISEASE: Status: ACTIVE | Noted: 2018-10-22

## 2018-10-22 NOTE — PROGRESS NOTES
PATIENT: Joanne Daniel  MRN: [id number]  DATE: 10/22/2018    Diagnosis:   1. Anemia of chronic disease    2. Reactive thrombocytosis    3. Advance care planning      Chief Complaint: anemia of chronic disease    Subjective:    History of Present Illness: Ms. Daniel is a 78 y.o. female who presented in October 2018 for evaluation and management of thrombocytosis. She was referred by her family medicine physician Dr. Hahn. Routine labs on 9/15/18 revealed a mild normocytic anemia at 11.5 g/dL and an elevated platelet count at 513 k/uL. Her platelet count in November 2016 was normal at 321 k/uL.    Patient denies a history of thrombosis, vasomotor symptoms, or constitutional symptoms (unintentional weight loss, fatigue). She takes several supplements, including turmeric and gingko. She denies a history of cancer. In 2016, interestingly, she saw hematologist/oncologist Dr. Powers for a cancer workup evaluation. The workup was negative, but labwork revealed an elevated erythrocyte sedimentation rate.     Interval history:   - she presents for a follow-up appointment for her thrombocytosis.  - Overall, she is doing well. She complains of fatigue and morning stiffness. She denies shortness of breath, chest pain, nausea, vomiting, diarrhea, constipation.    Past medical, surgical, family, and social histories have been reviewed and updated below.    Past Medical History:   Past Medical History:   Diagnosis Date    Foot drop, left foot     Glaucoma     Hyperlipidemia     Urinary tract infection     Vaginal infection        Past Surgical History:   Past Surgical History:   Procedure Laterality Date    EYE SURGERY Right     PHACOEMULSIFICATION-ASPIRATION-CATARACT Left 1/26/2017    Performed by Chris Manrique MD at [facility] OR       Family History:   Family History   Problem Relation Age of Onset    Heart disease Neg Hx     Cancer Neg Hx     Kidney disease Neg Hx        Social History:  reports that  has never smoked.  she has never used smokeless tobacco. She reports that she does not drink alcohol or use drugs.    Allergies:  Review of patient's allergies indicates:  No Known Allergies    Medications:  Current Outpatient Medications   Medication Sig Dispense Refill    azithromycin (Z-DIANNE) 250 MG tablet Take 1 tablet (250 mg total) by mouth once daily. 2 pills po x 1 day then 1 pill a day orally x 4 days 6 tablet 0    calcium-mag oxide-vitamin D3 185- mg-mg-unit Cap Take by mouth 3 (three) times daily.      levothyroxine (SYNTHROID) 25 MCG tablet Take 1 tablet (25 mcg total) by mouth once daily. 30 tablet 2    thiamine (VITAMIN B-1) 100 MG tablet Take 100 mg by mouth once daily.       No current facility-administered medications for this visit.      Review of Systems   Constitutional: Positive for fatigue. Negative for fever and unexpected weight change.   HENT: Negative for sore throat.    Eyes: Negative for visual disturbance.   Respiratory: Negative for shortness of breath.    Cardiovascular: Negative for chest pain and leg swelling.   Gastrointestinal: Negative for abdominal distention, abdominal pain, constipation, diarrhea, nausea and vomiting.   Genitourinary: Negative for dysuria.   Musculoskeletal: Negative for back pain.        Morning stiffness   Skin: Negative for rash.   Neurological: Negative for headaches.   Hematological: Negative for adenopathy.   Psychiatric/Behavioral: The patient is not nervous/anxious.      ECOG Performance Status:   ECOG SCORE 0       Objective:      Vitals:   Vitals:    10/23/18 1024   BP: (!) 158/71   BP Location: Left arm   Patient Position: Sitting   Pulse: 78   Resp: 17   Temp: 98.6 °F (37 °C)   TempSrc: Oral   SpO2: 95%   Weight: 60.7 kg (133 lb 13.1 oz)     BMI: Body mass index is 25.28 kg/m².    Physical Exam   Constitutional: She is oriented to person, place, and time. She appears well-developed and well-nourished. No distress.   HENT:   Head: Normocephalic and atraumatic.    Eyes: EOM are normal. Pupils are equal, round, and reactive to light.   Neck: Normal range of motion. Neck supple.   Cardiovascular: Normal rate and regular rhythm.   Pulmonary/Chest: Effort normal and breath sounds normal.   Abdominal: Soft. Bowel sounds are normal.   No splenomegaly appreciated on exam   Musculoskeletal: Normal range of motion. She exhibits no edema.   Neurological: She is alert and oriented to person, place, and time.   Skin: Skin is warm and dry.   Psychiatric: She has a normal mood and affect. Her behavior is normal. Judgment and thought content normal.   Nursing note and vitals reviewed.    Laboratory Data:  Labs have been reviewed.              Assessment:       1. Anemia of chronic disease    2. Reactive thrombocytosis    3. Advance care planning         Plan:     1. Anemia of chronic disease/inflammation, secondary thrombocytosis  - Labs have been reviewed.  - Workup for primary thrombocytosis (BCR/ABL, JAK2 with reflex testing) was negative.  - although saturated iron is low, her ferritin was within acceptable limits. Inflammatory markers ESR and CRP were elevated. These findings are consistent with the diagnosis of anemia of chronic disease/inflammation with secondary thrombocytosis.  - at this time, she does not need further workup.     2. Elevated inflammatory markers / possible inflammatory arthritis  - she notes moderate fatigue and morning stiffness. Her arthritis improves by the end of the day.  - given her symptoms and elevated inflammatory markers, I will perform a laboratory workup for rheumatologic conditions such as rheumatoid arthritis.  - if any of these tests are positive, I would recommend referral to rheumatology for further workup.    - return to clinic as needed.    Toney Mora M.D.  Hematology/Oncology  [facility] - [address], [address]  [place] [address]  Phone: [phone]  Fax: [phone]

## 2018-10-23 ENCOUNTER — OFFICE VISIT (OUTPATIENT)
Dept: HEMATOLOGY/ONCOLOGY | Facility: CLINIC | Age: 78
End: 2018-10-23
Payer: MEDICARE

## 2018-10-23 ENCOUNTER — LAB VISIT (OUTPATIENT)
Dept: LAB | Facility: HOSPITAL | Age: 78
End: 2018-10-23
Attending: INTERNAL MEDICINE
Payer: MEDICARE

## 2018-10-23 VITALS
BODY MASS INDEX: 25.28 KG/M2 | HEART RATE: 78 BPM | DIASTOLIC BLOOD PRESSURE: 71 MMHG | TEMPERATURE: 99 F | RESPIRATION RATE: 17 BRPM | SYSTOLIC BLOOD PRESSURE: 158 MMHG | WEIGHT: 133.81 LBS | OXYGEN SATURATION: 95 %

## 2018-10-23 DIAGNOSIS — D75.838 REACTIVE THROMBOCYTOSIS: ICD-10-CM

## 2018-10-23 DIAGNOSIS — Z71.89 ADVANCE CARE PLANNING: ICD-10-CM

## 2018-10-23 DIAGNOSIS — M19.90 INFLAMMATORY ARTHRITIS: ICD-10-CM

## 2018-10-23 DIAGNOSIS — D63.8 ANEMIA OF CHRONIC DISEASE: Primary | ICD-10-CM

## 2018-10-23 LAB — CCP AB SER IA-ACNC: <0.5 U/ML

## 2018-10-23 PROCEDURE — 99213 OFFICE O/P EST LOW 20 MIN: CPT | Mod: PBBFAC,PO | Performed by: INTERNAL MEDICINE

## 2018-10-23 PROCEDURE — 86431 RHEUMATOID FACTOR QUANT: CPT

## 2018-10-23 PROCEDURE — 86200 CCP ANTIBODY: CPT

## 2018-10-23 PROCEDURE — 86038 ANTINUCLEAR ANTIBODIES: CPT

## 2018-10-23 PROCEDURE — 99215 OFFICE O/P EST HI 40 MIN: CPT | Mod: S$PBB,,, | Performed by: INTERNAL MEDICINE

## 2018-10-23 PROCEDURE — 36415 COLL VENOUS BLD VENIPUNCTURE: CPT

## 2018-10-23 PROCEDURE — 99999 PR PBB SHADOW E&M-EST. PATIENT-LVL III: CPT | Mod: PBBFAC,,, | Performed by: INTERNAL MEDICINE

## 2018-10-24 LAB
ANA SER QL IF: NORMAL
RHEUMATOID FACT SERPL-ACNC: <10 IU/ML

## 2018-10-29 ENCOUNTER — TELEPHONE (OUTPATIENT)
Dept: HEMATOLOGY/ONCOLOGY | Facility: CLINIC | Age: 78
End: 2018-10-29

## 2018-10-29 NOTE — TELEPHONE ENCOUNTER
I called and left a voicemail. I told her that her autoimmune labs were negative. I still think she should see a rheumatologist given her arthritis and elevated inflammatory markers.    Toney Mora M.D.  Hematology/Oncology  [facility] - [address], [address]  [place] [address]  Phone: [phone]  Fax: [phone]

## 2018-11-02 ENCOUNTER — TELEPHONE (OUTPATIENT)
Dept: HEMATOLOGY/ONCOLOGY | Facility: CLINIC | Age: 78
End: 2018-11-02

## 2018-11-02 NOTE — TELEPHONE ENCOUNTER
Patient requesting a call from MD regarding test results.  Md notified.  ----- Message from Nia Estrada sent at 11/2/2018  4:28 PM CDT -----  Contact: self / [phone]  Patient is requesting a call back regarding, her test results. Please advise

## 2019-01-04 RX ORDER — LEVOTHYROXINE SODIUM 25 UG/1
TABLET ORAL
Qty: 30 TABLET | Refills: 2 | Status: SHIPPED | OUTPATIENT
Start: 2019-01-04 | End: 2019-04-01 | Stop reason: SDUPTHER

## 2019-02-08 ENCOUNTER — TELEPHONE (OUTPATIENT)
Dept: ADMINISTRATIVE | Facility: HOSPITAL | Age: 79
End: 2019-02-08

## 2019-02-18 ENCOUNTER — OFFICE VISIT (OUTPATIENT)
Dept: URGENT CARE | Facility: CLINIC | Age: 79
End: 2019-02-18
Payer: MEDICARE

## 2019-02-18 VITALS
TEMPERATURE: 99 F | BODY MASS INDEX: 25.11 KG/M2 | SYSTOLIC BLOOD PRESSURE: 109 MMHG | WEIGHT: 133 LBS | RESPIRATION RATE: 16 BRPM | OXYGEN SATURATION: 99 % | HEART RATE: 77 BPM | HEIGHT: 61 IN | DIASTOLIC BLOOD PRESSURE: 59 MMHG

## 2019-02-18 DIAGNOSIS — J10.1 INFLUENZA A: Primary | ICD-10-CM

## 2019-02-18 LAB
CTP QC/QA: YES
FLUAV AG NPH QL: POSITIVE
FLUBV AG NPH QL: NEGATIVE

## 2019-02-18 PROCEDURE — 87804 POCT INFLUENZA A/B: ICD-10-PCS | Mod: 59,QW,S$GLB, | Performed by: NURSE PRACTITIONER

## 2019-02-18 PROCEDURE — 99214 OFFICE O/P EST MOD 30 MIN: CPT | Mod: S$GLB,,, | Performed by: NURSE PRACTITIONER

## 2019-02-18 PROCEDURE — 87804 INFLUENZA ASSAY W/OPTIC: CPT | Mod: QW,S$GLB,, | Performed by: NURSE PRACTITIONER

## 2019-02-18 PROCEDURE — 99214 PR OFFICE/OUTPT VISIT, EST, LEVL IV, 30-39 MIN: ICD-10-PCS | Mod: S$GLB,,, | Performed by: NURSE PRACTITIONER

## 2019-02-18 RX ORDER — BENZONATATE 100 MG/1
100 CAPSULE ORAL EVERY 6 HOURS PRN
Qty: 30 CAPSULE | Refills: 1 | Status: SHIPPED | OUTPATIENT
Start: 2019-02-18 | End: 2020-01-17

## 2019-02-18 NOTE — PATIENT INSTRUCTIONS
"Please follow up with your Primary care provider within 2-5 days if your signs and symptoms have not resolved or worsen.  The usual course of cold symptoms are 10-14 days.     If your condition worsens or fails to improve we recommend that you receive another evaluation at the emergency room immediately or contact your primary medical clinic to discuss your concerns.     You must understand that you have received an Urgent Care treatment only and that you may be released before all of your medical problems are known or treated.   You, the patient, will arrange for follow up care as instructed.     Tylenol or Ibuprofen can also be used as directed for pain/fever unless you have an allergy to them or medical condition such as stomach ulcers, kidney or liver disease or blood thinners etc for which you should not be taking these type of medications.     Take over the counter cough medication as directed as needed for cough.  You should avoid medications with pseudoephedrine or phenylephrine (any medication with "D") if you have high blood pressure as this can cause an elevation in your blood pressure. Instead consider Corcidin HBP as needed to prevent an elevated blood pressure.     Natural remedies of symptoms (as needed) include humidification, saline nasal sprays, and/or steamy showers.  Increase fluids, warm tea with honey, cough drops as needed.  You may also use salt water gargles for sore throat.    IF you received a steroid shot today - As discussed, this can elevate your blood pressure, elevate your blood sugar, water weight gain, nervous energy, redness to the face and dimpling of the skin at the injection site.     The Flu (Influenza)     The virus that causes the flu spreads through the air in droplets when someone who has the flu coughs, sneezes, laughs, or talks.   The flu (influenza) is an infection that affects your respiratory tract. This tract is made up of your mouth, nose, and lungs, and the passages " between them. Unlike a cold, the flu can make you very ill. And it can lead to pneumonia, a serious lung infection. The flu can have serious complications and even cause death.  Who is at risk for the flu?  Anyone can get the flu. But you are more likely to become infected if you:  · Have a weakened immune system  · Work in a healthcare setting where you may be exposed to flu germs  · Live or work with someone who has the flu  · Havent had an annual flu shot  How does the flu spread?  The flu is caused by a virus. The virus spreads through the air in droplets when someone who has the flu coughs, sneezes, laughs, or talks. You can become infected when you inhale these viruses directly. You can also become infected when you touch a surface on which the droplets have landed and then transfer the germs to your eyes, nose, or mouth. Touching used tissues, or sharing utensils, drinking glasses, or a toothbrush from an infected person can expose you to flu viruses, too.  What are the symptoms of the flu?  Flu symptoms tend to come on quickly and may last a few days to a few weeks. They include:  · Fever usually higher than 100.4°F  (38°C) and chills  · Sore throat and headache  · Dry cough  · Runny nose  · Tiredness and weakness  · Muscle aches  Who is at risk for flu complications?  For some people, the flu can be very serious. The risk for complications is greater for:  · Children younger than age 5  · Adults ages 65 and older  · People with a chronic illness such as diabetes or heart, kidney, or lung disease  · People who live in a nursing home or long-term care facility   How is the flu treated?  The flu usually gets better after 7 days or so. In some cases, your healthcare provider may prescribe an antiviral medicine. This may help you get well a little sooner. For the medicine to help, you need to take it as soon as possible (ideally within 48 hours) after your symptoms start. If you develop pneumonia or other  serious illness, you may need to stay in the hospital.  Easing flu symptoms  · Drink lots of fluids such as water, juice, and warm soup. A good rule is to drink enough so that you urinate your normal amount.  · Get plenty of rest.  · Ask your healthcare provider what to take for fever and pain.  · Call your provider if your fever is 100.4°F (38°C) or higher, or you become dizzy, lightheaded, or short of breath.  Taking steps to protect others  · Wash your hands often, especially after coughing or sneezing. Or clean your hands with an alcohol-based hand  containing at least 60% alcohol.  · Cough or sneeze into a tissue. Then throw the tissue away and wash your hands. If you dont have a tissue, cough and sneeze into your elbow.  · Stay home until at least 24 hours after you no longer have a fever or chills. Be sure the fever isnt being hidden by fever-reducing medicine.  · Dont share food, utensils, drinking glasses, or a toothbrush with others.  · Ask your healthcare provider if others in your household should get antiviral medicine to help them avoid infection.  How can the flu be prevented?  · One of the best ways to avoid the flu is to get a flu vaccine each year. The virus that causes the flu changes from year to year. For that reason, healthcare providers recommend getting the flu vaccine each year, as soon as it's available in your area. The vaccine is given as a shot. Your healthcare provider can tell you which vaccine is right for you. A nasal spray is also available but is not recommended for the 9558-1150 flu season. The CDC says this is because the nasal spray did not seem to protect against the flu over the last several flu seasons. In the past, it was meant for people ages 2 to 49.  · Wash your hands often. Frequent handwashing is a proven way to help prevent infection.  · Carry an alcohol-based hand gel containing at least 60% alcohol. Use it when you can't use soap and water. Then wash your  hands as soon as you can.  · Avoid touching your eyes, nose, and mouth.  · At home and work, clean phones, computer keyboards, and toys often with disinfectant wipes.  · If possible, avoid close contact with others who have the flu or symptoms of the flu.  Handwashing tips  Handwashing is one of the best ways to prevent many common infections. If you are caring for or visiting someone with the flu, wash your hands each time you enter and leave the room. Follow these steps:  · Use warm water and plenty of soap. Rub your hands together well.  · Clean the whole hand, including under your nails, between your fingers, and up the wrists.  · Wash for at least 15 seconds.  · Rinse, letting the water run down your fingers, not up your wrists.  · Dry your hands well. Use a paper towel to turn off the faucet and open the door.  Using alcohol-based hand   Alcohol-based hand  are also a good choice. Use them when you can't use soap and water. Follow these steps:  · Squeeze about a tablespoon of gel into the palm of one hand.  · Rub your hands together briskly, cleaning the backs of your hands, the palms, between your fingers, and up the wrists.  · Rub until the gel is gone and your hands are completely dry.  Preventing the flu in healthcare settings  The flu is a special concern for people in hospitals and long-term care facilities. To help prevent the spread of flu, many hospitals and nursing homes take these steps:  · Healthcare providers wash their hands or use an alcohol-based hand  before and after treating each patient.  · People with the flu have private rooms and bathrooms or share a room with someone with the same infection.  · People who are at high risk for the flu but don't have it are encouraged to get the flu and pneumonia vaccines.  · All healthcare workers are encouraged or required to get flu shots.   Date Last Reviewed: 12/1/2016  © 1931-2876 [employer]. [address] [place] [address]  Road, [place] [address]. All rights reserved. This information is not intended as a substitute for professional medical care. Always follow your healthcare professional's instructions.

## 2019-02-18 NOTE — PROGRESS NOTES
"Subjective:       Patient ID: Joanne Daniel is a 78 y.o. female.    Vitals:  height is 5' 1" (1.549 m) and weight is 60.3 kg (133 lb). Her oral temperature is 99 °F (37.2 °C). Her blood pressure is 109/59 (abnormal) and her pulse is 77. Her respiration is 16 and oxygen saturation is 99%.     Chief Complaint: Cough    Cough   This is a new problem. Episode onset: 3 days ago. The problem has been unchanged. The problem occurs constantly. The cough is productive of sputum. Associated symptoms include chills and nasal congestion. Pertinent negatives include no chest pain, ear congestion, ear pain, eye redness, fever, headaches, hemoptysis, myalgias, postnasal drip, rash, rhinorrhea, sore throat, shortness of breath, sweats, weight loss or wheezing. Nothing aggravates the symptoms. Treatments tried: multi symptom cold, flu, sore throat. The treatment provided no relief. There is no history of bronchitis, COPD, emphysema or pneumonia.       Constitution: Positive for chills. Negative for sweating, fatigue and fever.   HENT: Negative for ear pain, congestion, postnasal drip, sinus pain, sinus pressure, sore throat and voice change.    Neck: Negative for painful lymph nodes.   Cardiovascular: Negative for chest pain.   Eyes: Negative for eye redness.   Respiratory: Positive for cough. Negative for chest tightness, sputum production, bloody sputum, COPD, shortness of breath, stridor, wheezing and asthma.    Gastrointestinal: Positive for nausea. Negative for vomiting.   Musculoskeletal: Negative for muscle ache.   Skin: Negative for rash.   Allergic/Immunologic: Negative for seasonal allergies and asthma.   Neurological: Negative for headaches.   Hematologic/Lymphatic: Negative for swollen lymph nodes.       Objective:      Physical Exam   Constitutional: She is oriented to person, place, and time. She appears well-developed and well-nourished. She is cooperative.  Non-toxic appearance. She does not appear ill. No distress. "   HENT:   Head: Normocephalic and atraumatic.   Right Ear: Hearing, tympanic membrane, external ear and ear canal normal.   Left Ear: Hearing, tympanic membrane, external ear and ear canal normal.   Nose: Nose normal. No mucosal edema, rhinorrhea or nasal deformity. No epistaxis. Right sinus exhibits no maxillary sinus tenderness and no frontal sinus tenderness. Left sinus exhibits no maxillary sinus tenderness and no frontal sinus tenderness.   Mouth/Throat: Uvula is midline, oropharynx is clear and moist and mucous membranes are normal. No trismus in the jaw. Normal dentition. No uvula swelling. No posterior oropharyngeal erythema.   Eyes: Conjunctivae and lids are normal. No scleral icterus.   Sclera clear bilat   Neck: Trachea normal, full passive range of motion without pain and phonation normal. Neck supple.   Cardiovascular: Normal rate, regular rhythm, normal heart sounds, intact distal pulses and normal pulses.   Pulmonary/Chest: Effort normal and breath sounds normal. No respiratory distress.   Abdominal: Soft. Normal appearance and bowel sounds are normal. She exhibits no distension, no abdominal bruit, no pulsatile midline mass and no mass. There is no tenderness.   Musculoskeletal: Normal range of motion. She exhibits no edema or deformity.   Neurological: She is alert and oriented to person, place, and time. She has normal strength. She exhibits normal muscle tone. Coordination normal.   Skin: Skin is warm, dry and intact. She is not diaphoretic. No pallor.   Psychiatric: She has a normal mood and affect. Her speech is normal and behavior is normal. Judgment and thought content normal. Cognition and memory are normal.   Nursing note and vitals reviewed.      Assessment:       1. Influenza A        Plan:       Results for orders placed or performed in visit on 02/18/19   POCT Influenza A/B   Result Value Ref Range    Rapid Influenza A Ag Positive (A) Negative    Rapid Influenza B Ag Negative Negative     " Acceptable Yes        Influenza A  -     POCT Influenza A/B  -     benzonatate (TESSALON PERLES) 100 MG capsule; Take 1 capsule (100 mg total) by mouth every 6 (six) hours as needed for Cough.  Dispense: 30 capsule; Refill: 1      Patient Instructions     Please follow up with your Primary care provider within 2-5 days if your signs and symptoms have not resolved or worsen.  The usual course of cold symptoms are 10-14 days.     If your condition worsens or fails to improve we recommend that you receive another evaluation at the emergency room immediately or contact your primary medical clinic to discuss your concerns.     You must understand that you have received an Urgent Care treatment only and that you may be released before all of your medical problems are known or treated.   You, the patient, will arrange for follow up care as instructed.     Tylenol or Ibuprofen can also be used as directed for pain/fever unless you have an allergy to them or medical condition such as stomach ulcers, kidney or liver disease or blood thinners etc for which you should not be taking these type of medications.     Take over the counter cough medication as directed as needed for cough.  You should avoid medications with pseudoephedrine or phenylephrine (any medication with "D") if you have high blood pressure as this can cause an elevation in your blood pressure. Instead consider Corcidin HBP as needed to prevent an elevated blood pressure.     Natural remedies of symptoms (as needed) include humidification, saline nasal sprays, and/or steamy showers.  Increase fluids, warm tea with honey, cough drops as needed.  You may also use salt water gargles for sore throat.    IF you received a steroid shot today - As discussed, this can elevate your blood pressure, elevate your blood sugar, water weight gain, nervous energy, redness to the face and dimpling of the skin at the injection site.     The Flu (Influenza)     The " virus that causes the flu spreads through the air in droplets when someone who has the flu coughs, sneezes, laughs, or talks.   The flu (influenza) is an infection that affects your respiratory tract. This tract is made up of your mouth, nose, and lungs, and the passages between them. Unlike a cold, the flu can make you very ill. And it can lead to pneumonia, a serious lung infection. The flu can have serious complications and even cause death.  Who is at risk for the flu?  Anyone can get the flu. But you are more likely to become infected if you:  · Have a weakened immune system  · Work in a healthcare setting where you may be exposed to flu germs  · Live or work with someone who has the flu  · Havent had an annual flu shot  How does the flu spread?  The flu is caused by a virus. The virus spreads through the air in droplets when someone who has the flu coughs, sneezes, laughs, or talks. You can become infected when you inhale these viruses directly. You can also become infected when you touch a surface on which the droplets have landed and then transfer the germs to your eyes, nose, or mouth. Touching used tissues, or sharing utensils, drinking glasses, or a toothbrush from an infected person can expose you to flu viruses, too.  What are the symptoms of the flu?  Flu symptoms tend to come on quickly and may last a few days to a few weeks. They include:  · Fever usually higher than 100.4°F  (38°C) and chills  · Sore throat and headache  · Dry cough  · Runny nose  · Tiredness and weakness  · Muscle aches  Who is at risk for flu complications?  For some people, the flu can be very serious. The risk for complications is greater for:  · Children younger than age 5  · Adults ages 65 and older  · People with a chronic illness such as diabetes or heart, kidney, or lung disease  · People who live in a nursing home or long-term care facility   How is the flu treated?  The flu usually gets better after 7 days or so. In some  cases, your healthcare provider may prescribe an antiviral medicine. This may help you get well a little sooner. For the medicine to help, you need to take it as soon as possible (ideally within 48 hours) after your symptoms start. If you develop pneumonia or other serious illness, you may need to stay in the hospital.  Easing flu symptoms  · Drink lots of fluids such as water, juice, and warm soup. A good rule is to drink enough so that you urinate your normal amount.  · Get plenty of rest.  · Ask your healthcare provider what to take for fever and pain.  · Call your provider if your fever is 100.4°F (38°C) or higher, or you become dizzy, lightheaded, or short of breath.  Taking steps to protect others  · Wash your hands often, especially after coughing or sneezing. Or clean your hands with an alcohol-based hand  containing at least 60% alcohol.  · Cough or sneeze into a tissue. Then throw the tissue away and wash your hands. If you dont have a tissue, cough and sneeze into your elbow.  · Stay home until at least 24 hours after you no longer have a fever or chills. Be sure the fever isnt being hidden by fever-reducing medicine.  · Dont share food, utensils, drinking glasses, or a toothbrush with others.  · Ask your healthcare provider if others in your household should get antiviral medicine to help them avoid infection.  How can the flu be prevented?  · One of the best ways to avoid the flu is to get a flu vaccine each year. The virus that causes the flu changes from year to year. For that reason, healthcare providers recommend getting the flu vaccine each year, as soon as it's available in your area. The vaccine is given as a shot. Your healthcare provider can tell you which vaccine is right for you. A nasal spray is also available but is not recommended for the 1399-1025 flu season. The CDC says this is because the nasal spray did not seem to protect against the flu over the last several flu seasons.  In the past, it was meant for people ages 2 to 49.  · Wash your hands often. Frequent handwashing is a proven way to help prevent infection.  · Carry an alcohol-based hand gel containing at least 60% alcohol. Use it when you can't use soap and water. Then wash your hands as soon as you can.  · Avoid touching your eyes, nose, and mouth.  · At home and work, clean phones, computer keyboards, and toys often with disinfectant wipes.  · If possible, avoid close contact with others who have the flu or symptoms of the flu.  Handwashing tips  Handwashing is one of the best ways to prevent many common infections. If you are caring for or visiting someone with the flu, wash your hands each time you enter and leave the room. Follow these steps:  · Use warm water and plenty of soap. Rub your hands together well.  · Clean the whole hand, including under your nails, between your fingers, and up the wrists.  · Wash for at least 15 seconds.  · Rinse, letting the water run down your fingers, not up your wrists.  · Dry your hands well. Use a paper towel to turn off the faucet and open the door.  Using alcohol-based hand   Alcohol-based hand  are also a good choice. Use them when you can't use soap and water. Follow these steps:  · Squeeze about a tablespoon of gel into the palm of one hand.  · Rub your hands together briskly, cleaning the backs of your hands, the palms, between your fingers, and up the wrists.  · Rub until the gel is gone and your hands are completely dry.  Preventing the flu in healthcare settings  The flu is a special concern for people in hospitals and long-term care facilities. To help prevent the spread of flu, many hospitals and nursing homes take these steps:  · Healthcare providers wash their hands or use an alcohol-based hand  before and after treating each patient.  · People with the flu have private rooms and bathrooms or share a room with someone with the same infection.  · People  who are at high risk for the flu but don't have it are encouraged to get the flu and pneumonia vaccines.  · All healthcare workers are encouraged or required to get flu shots.   Date Last Reviewed: 12/1/2016  © 3647-2407 [employer]. [address] [place] [address], [place] [address]. All rights reserved. This information is not intended as a substitute for professional medical care. Always follow your healthcare professional's instructions.

## 2019-04-01 RX ORDER — LEVOTHYROXINE SODIUM 25 UG/1
TABLET ORAL
Qty: 30 TABLET | Refills: 0 | Status: SHIPPED | OUTPATIENT
Start: 2019-04-01 | End: 2019-04-29 | Stop reason: SDUPTHER

## 2019-04-29 RX ORDER — LEVOTHYROXINE SODIUM 25 UG/1
TABLET ORAL
Qty: 30 TABLET | Refills: 0 | Status: SHIPPED | OUTPATIENT
Start: 2019-04-29 | End: 2019-12-19

## 2019-04-29 NOTE — TELEPHONE ENCOUNTER
Left a message asking pt to call back to schedule a follow up appointment before more refills can be given

## 2019-05-24 NOTE — TELEPHONE ENCOUNTER
----- Message from Latosha Nelson sent at 9/26/2018  1:29 PM CDT -----  Contact: self, [phone]  Patient states Dr. Mora's office needs her records before her 10/2 appt and since you referred her to him, your office needs to send a STAT request to her previous doctor at [facility], phone number [phone]. Please advise.   Universal Safety Interventions

## 2019-06-07 ENCOUNTER — OFFICE VISIT (OUTPATIENT)
Dept: URGENT CARE | Facility: CLINIC | Age: 79
End: 2019-06-07
Payer: MEDICARE

## 2019-06-07 VITALS
SYSTOLIC BLOOD PRESSURE: 150 MMHG | HEIGHT: 61 IN | TEMPERATURE: 97 F | DIASTOLIC BLOOD PRESSURE: 68 MMHG | HEART RATE: 67 BPM | RESPIRATION RATE: 16 BRPM | OXYGEN SATURATION: 98 % | BODY MASS INDEX: 26.06 KG/M2 | WEIGHT: 138 LBS

## 2019-06-07 DIAGNOSIS — R30.0 DYSURIA: Primary | ICD-10-CM

## 2019-06-07 LAB
BILIRUB UR QL STRIP: NEGATIVE
GLUCOSE UR QL STRIP: NEGATIVE
KETONES UR QL STRIP: NEGATIVE
LEUKOCYTE ESTERASE UR QL STRIP: NEGATIVE
PH, POC UA: 5.5 (ref 5–8)
POC BLOOD, URINE: NEGATIVE
POC NITRATES, URINE: NEGATIVE
PROT UR QL STRIP: NEGATIVE
SP GR UR STRIP: 1.01 (ref 1–1.03)
UROBILINOGEN UR STRIP-ACNC: NORMAL (ref 0.1–1.1)

## 2019-06-07 PROCEDURE — 81003 URINALYSIS AUTO W/O SCOPE: CPT | Mod: QW,S$GLB,, | Performed by: NURSE PRACTITIONER

## 2019-06-07 PROCEDURE — 99213 OFFICE O/P EST LOW 20 MIN: CPT | Mod: S$GLB,,, | Performed by: NURSE PRACTITIONER

## 2019-06-07 PROCEDURE — 99213 PR OFFICE/OUTPT VISIT, EST, LEVL III, 20-29 MIN: ICD-10-PCS | Mod: S$GLB,,, | Performed by: NURSE PRACTITIONER

## 2019-06-07 PROCEDURE — 81003 POCT URINALYSIS, DIPSTICK, AUTOMATED, W/O SCOPE: ICD-10-PCS | Mod: QW,S$GLB,, | Performed by: NURSE PRACTITIONER

## 2019-06-07 RX ORDER — MOMETASONE FUROATE 1 MG/ML
SOLUTION TOPICAL
Refills: 3 | COMMUNITY
Start: 2019-05-29 | End: 2020-01-17

## 2019-06-07 NOTE — PATIENT INSTRUCTIONS
Return to Urgent Care or go to ER if symptoms worsen or fail to improve.  Follow up with PCP as recommended for further management.   Keep appointment with Urology on Monday.         Dysuria with Uncertain Cause (Adult)    The urethra is the tube that allows urine to pass out of the body. In a woman, the urethra is the opening above the vagina. In men, the urethra is the opening on the tip of the penis. Dysuria is the feeling of pain or burning in the urethra when passing urine.  Dysuria can be caused by anything that irritates or inflames the urethra. An infection or chemical irritation can cause this reaction. A bladder infection is the most common cause of dysuria in adults. A urine test can diagnose this. A bladder infection needs antibiotic treatment.  Soaps, lotions, colognes and feminine hygiene products can cause dysuria. So can birth control jellies, creams, and foams. It will go away 1 to 3 days after using these irritants.  Sexually transmitted diseases (STDs) such as chlamydia or gonorrhea can cause dysuria. Your healthcare provider may take a culture sample. Your provider may start you on antibiotic medicine before the culture test returns.  In women who have gone through menopause, dysuria can be from dryness in the lining of the urethra. This can be treated with hormones. Dysuria becomes long-term (chronic) when it lasts for weeks or months. You may need to see a specialist (urologist) to diagnose and treat chronic dysuria.  Home care  These home care tips may help:  · Don't use any chemicals or products that you think may be causing your symptoms.  · If you were given a prescription medicine, take as directed. Be sure to take it until it is all used up.  · If a culture was taken, don't have sex until you have been told that it is negative. This means you don't have an infection. Then follow your healthcare provider's advice to treat your condition.  If a culture was done and it is positive:  · Both  you and your sexual partner may need to be treated. This is true even if your partner has no symptoms.  · Contact your healthcare provider or go to an urgent care clinic or the public health department to be looked at and treated.  · Don't have sex until both you and your partner(s) have finished all antibiotics and your healthcare provider says you are no longer contagious.  · Learn about and use safe sex practices. The safest sex is with a partner who has tested negative and only has sex with you. Condoms can prevent STDs from spreading, but they aren't a guarantee.  Follow-up care  Follow up with your healthcare provider, or as advised. If a culture was taken, you may call as directed for the results. If you have an STD, follow up with your provider or the public health department for a complete STD screening, including HIV testing. For more information, contact CDC-INFO at 875-194-8004.  When to seek medical advice  Call your healthcare provider right away if any of these occur:  · You aren't better after 3 days of treatment  · Fever of 100.4ºF (38ºC) or higher, or as directed by your healthcare provider  · Back or belly pain that gets worse  · You can't urinate because of pain  · New discharge from the urethra, vagina, or penis  · Painful sores on the penis  · Rash or joint pain  · Painful lumps (lymph nodes) in the groin  · Testicle pain or swelling of the scrotum  Date Last Reviewed: 11/1/2016 © 2000-2017 [employer]. [address] [place] [address], [place] [address]. All rights reserved. This information is not intended as a substitute for professional medical care. Always follow your healthcare professional's instructions.

## 2019-06-07 NOTE — PROGRESS NOTES
"Subjective:       Patient ID: Joanne Daniel is a 79 y.o. female.    Vitals:  height is 5' 1" (1.549 m) and weight is 62.6 kg (138 lb). Her tympanic temperature is 96.9 °F (36.1 °C). Her blood pressure is 150/68 (abnormal) and her pulse is 67. Her respiration is 16 and oxygen saturation is 98%.     Chief Complaint: Dysuria    Patient has history of urinary caruncle and notice only one occasion last night where she saw blood in her urine.    Dysuria    This is a new problem. The current episode started yesterday. The problem has been rapidly improving. The patient is experiencing no pain. There has been no fever. Associated symptoms include hematuria. Pertinent negatives include no chills, flank pain, frequency, nausea, urgency, vomiting or rash. She has tried nothing for the symptoms.       Constitution: Negative for chills and fever.   Neck: Negative for painful lymph nodes.   Gastrointestinal: Negative for abdominal pain, nausea and vomiting.   Genitourinary: Positive for hematuria. Negative for dysuria, frequency, urgency, urine decreased, flank pain, history of kidney stones, painful menstruation, irregular menstruation, missed menses, heavy menstrual bleeding, ovarian cysts, genital trauma, vaginal pain, vaginal discharge, vaginal bleeding, vaginal odor, painful intercourse, genital sore, painful ejaculation and pelvic pain.   Musculoskeletal: Negative for back pain.   Skin: Negative for rash and lesion.   Hematologic/Lymphatic: Negative for swollen lymph nodes.       Objective:      Physical Exam   Constitutional: She is oriented to person, place, and time. Vital signs are normal. She appears well-developed and well-nourished.   HENT:   Head: Normocephalic and atraumatic.   Right Ear: External ear normal.   Left Ear: External ear normal.   Nose: Nose normal.   Mouth/Throat: Oropharynx is clear and moist.   Eyes: Pupils are equal, round, and reactive to light. EOM are normal.   Neck: Normal range of motion. Neck " supple.   Cardiovascular: Normal rate and regular rhythm.   Pulses:       Radial pulses are 2+ on the right side, and 2+ on the left side.   Pulmonary/Chest: Effort normal and breath sounds normal. No stridor. No respiratory distress. She has no wheezes.   Abdominal: Soft. Normal appearance and bowel sounds are normal. She exhibits no distension. There is no tenderness. There is no CVA tenderness.   Neurological: She is alert and oriented to person, place, and time. She exhibits normal muscle tone. Coordination and gait normal.   Skin: Skin is warm, dry and intact. No rash noted.   Psychiatric: She has a normal mood and affect. Her speech is normal and behavior is normal.   Nursing note and vitals reviewed.        Results for orders placed or performed in visit on 06/07/19   POCT Urinalysis, Dipstick, Automated, W/O Scope   Result Value Ref Range    POC Blood, Urine Negative Negative    POC Bilirubin, Urine Negative Negative    POC Urobilinogen, Urine Normal 0.1 - 1.1    POC Ketones, Urine Negative Negative    POC Protein, Urine Negative Negative    POC Nitrates, Urine Negative Negative    POC Glucose, Urine Negative Negative    pH, UA 5.5 5 - 8    POC Specific Gravity, Urine 1.010 1.003 - 1.029    POC Leukocytes, Urine Negative Negative       Assessment:       1. Dysuria        Plan:         Dysuria  -     POCT Urinalysis, Dipstick, Automated, W/O Scope  -     Culture, Urine

## 2019-06-10 ENCOUNTER — OFFICE VISIT (OUTPATIENT)
Dept: UROLOGY | Facility: CLINIC | Age: 79
End: 2019-06-10
Payer: MEDICARE

## 2019-06-10 VITALS
SYSTOLIC BLOOD PRESSURE: 152 MMHG | BODY MASS INDEX: 26.27 KG/M2 | WEIGHT: 139.13 LBS | HEART RATE: 76 BPM | DIASTOLIC BLOOD PRESSURE: 73 MMHG | HEIGHT: 61 IN

## 2019-06-10 DIAGNOSIS — R31.9 HEMATURIA, UNSPECIFIED TYPE: Primary | ICD-10-CM

## 2019-06-10 DIAGNOSIS — N95.2 ATROPHIC VAGINITIS: ICD-10-CM

## 2019-06-10 DIAGNOSIS — N89.8 VAGINAL IRRITATION: ICD-10-CM

## 2019-06-10 DIAGNOSIS — N36.2 URETHRAL CARUNCLE: ICD-10-CM

## 2019-06-10 LAB
BILIRUB SERPL-MCNC: NORMAL MG/DL
BLOOD URINE, POC: NORMAL
COLOR, POC UA: YELLOW
GLUCOSE UR QL STRIP: NORMAL
KETONES UR QL STRIP: NORMAL
LEUKOCYTE ESTERASE URINE, POC: NORMAL
NITRITE, POC UA: NORMAL
PH, POC UA: 5.5
PROTEIN, POC: NORMAL
SPECIFIC GRAVITY, POC UA: 1.01
UROBILINOGEN, POC UA: 0.2

## 2019-06-10 PROCEDURE — 99214 PR OFFICE/OUTPT VISIT, EST, LEVL IV, 30-39 MIN: ICD-10-PCS | Mod: S$PBB,,, | Performed by: NURSE PRACTITIONER

## 2019-06-10 PROCEDURE — 99999 PR PBB SHADOW E&M-EST. PATIENT-LVL III: ICD-10-PCS | Mod: PBBFAC,,, | Performed by: NURSE PRACTITIONER

## 2019-06-10 PROCEDURE — 87086 URINE CULTURE/COLONY COUNT: CPT

## 2019-06-10 PROCEDURE — 81002 URINALYSIS NONAUTO W/O SCOPE: CPT | Mod: PBBFAC,PO | Performed by: NURSE PRACTITIONER

## 2019-06-10 PROCEDURE — 99999 PR PBB SHADOW E&M-EST. PATIENT-LVL III: CPT | Mod: PBBFAC,,, | Performed by: NURSE PRACTITIONER

## 2019-06-10 PROCEDURE — 99213 OFFICE O/P EST LOW 20 MIN: CPT | Mod: PBBFAC,PO | Performed by: NURSE PRACTITIONER

## 2019-06-10 PROCEDURE — 99214 OFFICE O/P EST MOD 30 MIN: CPT | Mod: S$PBB,,, | Performed by: NURSE PRACTITIONER

## 2019-06-10 RX ORDER — FLUCONAZOLE 150 MG/1
150 TABLET ORAL DAILY
Qty: 1 TABLET | Refills: 0 | Status: SHIPPED | OUTPATIENT
Start: 2019-06-10 | End: 2019-06-11

## 2019-06-10 RX ORDER — ESTRADIOL 0.1 MG/G
1 CREAM VAGINAL DAILY
Qty: 30 G | Refills: 2 | Status: SHIPPED | OUTPATIENT
Start: 2019-06-10 | End: 2019-06-10 | Stop reason: SDUPTHER

## 2019-06-10 RX ORDER — ESTRADIOL 0.1 MG/G
1 CREAM VAGINAL DAILY
Qty: 30 G | Refills: 2 | Status: SHIPPED | OUTPATIENT
Start: 2019-06-10 | End: 2019-09-10 | Stop reason: SDUPTHER

## 2019-06-10 RX ORDER — NYSTATIN AND TRIAMCINOLONE ACETONIDE 100000; 1 [USP'U]/G; MG/G
CREAM TOPICAL 4 TIMES DAILY
Qty: 15 G | Refills: 1 | Status: SHIPPED | OUTPATIENT
Start: 2019-06-10 | End: 2021-10-07

## 2019-06-10 NOTE — PROGRESS NOTES
Subjective:       Patient ID: Joanne Daniel is a 79 y.o. female.    Chief Complaint: Hematuria    Patient is new to me. She is a 78 yo WF who is here today for blood in her urine, but she's not sure if the blood noted was from her urine, urethral caruncle, or rectum. The blood was noted after she got up from the toilet and seen scant amount of bright red blood in toilet commode. She has not noted blood since. Patient reports currently having external hemorrhoids and experiencing constipation. She's scheduled for another colonoscopy in 1 month by Dr. Leahy at [facility].     Review of Systems   Constitutional: Negative for appetite change, chills, fatigue and fever.   Gastrointestinal: Positive for constipation. Negative for abdominal pain, diarrhea, nausea and vomiting.   Genitourinary: Positive for hematuria. Negative for decreased urine volume, difficulty urinating, dysuria, flank pain, frequency, urgency, vaginal bleeding, vaginal discharge and vaginal pain.        Vaginal itching and rawness   Musculoskeletal: Negative.    Neurological: Negative for dizziness and headaches.   Psychiatric/Behavioral: Negative.        Objective:      Physical Exam   Constitutional: She is oriented to person, place, and time. She appears well-developed and well-nourished. No distress.   HENT:   Head: Normocephalic and atraumatic.   Eyes: Pupils are equal, round, and reactive to light. EOM are normal.   Neck: Normal range of motion.   Cardiovascular: Normal rate.   Pulmonary/Chest: Effort normal. No respiratory distress.   Abdominal: Soft. There is no tenderness.   Genitourinary: Rectal exam shows external hemorrhoid.       There is rash and tenderness on the right labia. There is rash and tenderness on the left labia.   Genitourinary Comments: Vaginal irritation, redness, and tenderness noted where graphically documented.    Musculoskeletal: Normal range of motion. She exhibits no edema.   Neurological: She is alert and oriented to  person, place, and time. Coordination normal.   Skin: Skin is warm and dry.   Psychiatric: She has a normal mood and affect. Her behavior is normal. Judgment and thought content normal.   Nursing note and vitals reviewed.      * POCT urine dipstick NEGATIVE for blood on today. Negative for blood on U/A dated 6/7/2019.    Assessment:       1. Hematuria, unspecified type    2. Vaginal irritation    3. Urethral caruncle    4. Atrophic vaginitis        Plan:       Joanne was seen today for hematuria.    Diagnoses and all orders for this visit:    Hematuria, unspecified type  -     POCT URINE DIPSTICK WITHOUT MICROSCOPE  -     Urine culture    Vaginal irritation  -     POCT URINE DIPSTICK WITHOUT MICROSCOPE  -     Urine culture  -     fluconazole (DIFLUCAN) 150 MG Tab; Take 1 tablet (150 mg total) by mouth once daily. for 1 day  -     nystatin-triamcinolone (MYCOLOG II) cream; Apply topically 4 (four) times daily. for 7 days to affected yeast area.    Urethral caruncle  -     estradiol (ESTRACE) 0.01 % (0.1 mg/gram) vaginal cream; Place 1 g vaginally once daily. For 2 weeks, then apply twice weekly.    Atrophic vaginitis  -     estradiol (ESTRACE) 0.01 % (0.1 mg/gram) vaginal cream; Place 1 g vaginally once daily. For 2 weeks, then apply twice weekly.    Other order  1. Estrace cream to the vaginal vestibule and urethral carbuncle as well as small amount in the introitus nightly for 2 weeks and used every third night for 3 months.    Follow-up in 3 months for evaluation of therapy.    Anabel Berumen NP

## 2019-06-10 NOTE — PATIENT INSTRUCTIONS
1. Urine dipstick  2. Urine cx  3. Estrace cream to the vaginal vestibule and urethral carbuncle as well as small amount in the introitus nightly for 2 weeks and used every third night for 3 months.  4. Follow-up in 3 months for evaluation of therapy.

## 2019-06-11 ENCOUNTER — TELEPHONE (OUTPATIENT)
Dept: URGENT CARE | Facility: CLINIC | Age: 79
End: 2019-06-11

## 2019-06-11 LAB
BACTERIA UR CULT: NO GROWTH
BACTERIA UR CULT: NORMAL
BACTERIA UR CULT: NORMAL

## 2019-06-11 NOTE — TELEPHONE ENCOUNTER
Call back DOS 06/07/19 - Urine culture results - Left message to call back regarding lab results.    ----- Message from Karime Colbert NP sent at 6/11/2019 12:29 PM CDT -----  Negative result, call to check on patient and give result to patient.

## 2019-06-12 ENCOUNTER — TELEPHONE (OUTPATIENT)
Dept: URGENT CARE | Facility: CLINIC | Age: 79
End: 2019-06-12

## 2019-06-12 NOTE — TELEPHONE ENCOUNTER
Call back DOS 06/07/19 - Urine culture - Spoke with patient and gave her urine culture results. She stated that she followed up with her primary 06/10/19 and found out she had a yeast infection. She is now doing better.    ----- Message from Karmie Colbert NP sent at 6/11/2019 12:29 PM CDT -----  Negative result, call to check on patient and give result to patient.

## 2019-06-13 ENCOUNTER — TELEPHONE (OUTPATIENT)
Dept: UROLOGY | Facility: CLINIC | Age: 79
End: 2019-06-13

## 2019-06-13 NOTE — TELEPHONE ENCOUNTER
----- Message from Anabel Berumen NP sent at 6/13/2019  3:36 PM CDT -----  Please inform patient her urine cx is normal. She does not have a UTI.

## 2019-07-01 ENCOUNTER — PATIENT OUTREACH (OUTPATIENT)
Dept: ADMINISTRATIVE | Facility: OTHER | Age: 79
End: 2019-07-01

## 2019-08-12 ENCOUNTER — TELEPHONE (OUTPATIENT)
Dept: ADMINISTRATIVE | Facility: HOSPITAL | Age: 79
End: 2019-08-12

## 2019-09-09 ENCOUNTER — PATIENT OUTREACH (OUTPATIENT)
Dept: ADMINISTRATIVE | Facility: OTHER | Age: 79
End: 2019-09-09

## 2019-09-10 ENCOUNTER — OFFICE VISIT (OUTPATIENT)
Dept: UROLOGY | Facility: CLINIC | Age: 79
End: 2019-09-10
Payer: MEDICARE

## 2019-09-10 VITALS
HEART RATE: 63 BPM | OXYGEN SATURATION: 98 % | TEMPERATURE: 98 F | HEIGHT: 61 IN | BODY MASS INDEX: 26.24 KG/M2 | DIASTOLIC BLOOD PRESSURE: 74 MMHG | SYSTOLIC BLOOD PRESSURE: 144 MMHG | WEIGHT: 139 LBS | RESPIRATION RATE: 17 BRPM

## 2019-09-10 DIAGNOSIS — N95.2 ATROPHIC VAGINITIS: ICD-10-CM

## 2019-09-10 DIAGNOSIS — N36.2 URETHRAL CARUNCLE: Primary | ICD-10-CM

## 2019-09-10 DIAGNOSIS — Z09 FOLLOW-UP EXAM: ICD-10-CM

## 2019-09-10 PROCEDURE — 81002 URINALYSIS NONAUTO W/O SCOPE: CPT | Mod: PBBFAC,PO | Performed by: NURSE PRACTITIONER

## 2019-09-10 PROCEDURE — 99999 PR PBB SHADOW E&M-EST. PATIENT-LVL IV: CPT | Mod: PBBFAC,,, | Performed by: NURSE PRACTITIONER

## 2019-09-10 PROCEDURE — 99999 PR PBB SHADOW E&M-EST. PATIENT-LVL IV: ICD-10-PCS | Mod: PBBFAC,,, | Performed by: NURSE PRACTITIONER

## 2019-09-10 PROCEDURE — 99214 OFFICE O/P EST MOD 30 MIN: CPT | Mod: PBBFAC,PO | Performed by: NURSE PRACTITIONER

## 2019-09-10 PROCEDURE — 99212 PR OFFICE/OUTPT VISIT, EST, LEVL II, 10-19 MIN: ICD-10-PCS | Mod: S$PBB,,, | Performed by: NURSE PRACTITIONER

## 2019-09-10 PROCEDURE — 99212 OFFICE O/P EST SF 10 MIN: CPT | Mod: S$PBB,,, | Performed by: NURSE PRACTITIONER

## 2019-09-10 RX ORDER — ESTRADIOL 0.1 MG/G
1 CREAM VAGINAL DAILY
Qty: 30 G | Refills: 2 | Status: SHIPPED | OUTPATIENT
Start: 2019-09-10 | End: 2021-05-11 | Stop reason: SDUPTHER

## 2019-09-10 NOTE — PROGRESS NOTES
Subjective:       Patient ID: Joanne Daniel is a 79 y.o. female.    Chief Complaint: Follow-up ( month check up for urethral carbuncle and dysuria)    Patient is a 78 yo WF who is here today for a follow-up for urethral caruncle and atrophic vaginitis. She is currently using estrace vaginal cream. Patient has no complaints at this time.   Other   This is a chronic (atrophic vaginitis) problem. The current episode started more than 1 month ago. The problem occurs daily. The problem has been gradually improving. Associated symptoms include a change in bowel habit, fatigue and urinary symptoms (nocturia x1). Pertinent negatives include no abdominal pain, anorexia, chills, diaphoresis, fever, headaches, nausea, swollen glands, vertigo, visual change, vomiting or weakness. Nothing aggravates the symptoms. Treatments tried: estrace cream. The treatment provided moderate relief.     Review of Systems   Constitutional: Positive for fatigue. Negative for appetite change, chills, diaphoresis and fever.   Gastrointestinal: Positive for change in bowel habit and constipation. Negative for abdominal pain, anorexia, diarrhea, nausea and vomiting.   Genitourinary: Negative for decreased urine volume, difficulty urinating, dysuria, flank pain, frequency, hematuria, urgency, vaginal bleeding, vaginal discharge and vaginal pain.        Nocturia x1   Neurological: Negative for dizziness, vertigo, weakness and headaches.   Psychiatric/Behavioral: Negative.        Objective:      Physical Exam   Constitutional: She is oriented to person, place, and time. She appears well-developed and well-nourished. No distress.   HENT:   Head: Normocephalic and atraumatic.   Eyes: Pupils are equal, round, and reactive to light. EOM are normal.   Neck: Normal range of motion.   Cardiovascular: Normal rate.   Pulmonary/Chest: Effort normal. No respiratory distress.   Abdominal: Soft. There is no tenderness.   Genitourinary:         Musculoskeletal:  Normal range of motion. She exhibits no edema.   Neurological: She is alert and oriented to person, place, and time. Coordination normal.   Skin: Skin is warm and dry.   Psychiatric: She has a normal mood and affect. Her behavior is normal. Judgment and thought content normal.   Nursing note and vitals reviewed.      Assessment:       1. Urethral caruncle    2. Atrophic vaginitis    3. Follow-up exam        Plan:       Joanne was seen today for follow-up.    Diagnoses and all orders for this visit:    Urethral caruncle  -     estradiol (ESTRACE) 0.01 % (0.1 mg/gram) vaginal cream; Place 1 g vaginally once daily. For 2 weeks, then apply twice weekly.  -     POCT URINE DIPSTICK WITHOUT MICROSCOPE    Atrophic vaginitis  -     estradiol (ESTRACE) 0.01 % (0.1 mg/gram) vaginal cream; Place 1 g vaginally once daily. For 2 weeks, then apply twice weekly.  -     POCT URINE DIPSTICK WITHOUT MICROSCOPE    Follow-up exam  -     POCT URINE DIPSTICK WITHOUT MICROSCOPE    Other order  1. Continue estrace cream as previously prescribed (use twice weekly). REFILLED.    Follow-up in 6 months and as needed.     Anabel Berumen NP

## 2019-09-10 NOTE — PATIENT INSTRUCTIONS
Continue estrace cream as previously prescribed (use twice weekly). REFILLED.  Follow-up in 6 months and as needed.

## 2019-12-19 RX ORDER — LEVOTHYROXINE SODIUM 25 UG/1
TABLET ORAL
Qty: 30 TABLET | Refills: 0 | Status: SHIPPED | OUTPATIENT
Start: 2019-12-19 | End: 2020-01-17 | Stop reason: SDUPTHER

## 2019-12-19 NOTE — TELEPHONE ENCOUNTER
Spoke with pt she is seeing Dr. Mas [facility]. Pt doesn't need this script because she is taking levoxyl prescribed by Dr. Mas. She will call back to schedule an appointment with you.

## 2020-01-17 ENCOUNTER — OFFICE VISIT (OUTPATIENT)
Dept: FAMILY MEDICINE | Facility: CLINIC | Age: 80
End: 2020-01-17
Payer: MEDICARE

## 2020-01-17 VITALS
WEIGHT: 136.5 LBS | HEART RATE: 74 BPM | TEMPERATURE: 98 F | OXYGEN SATURATION: 98 % | RESPIRATION RATE: 18 BRPM | SYSTOLIC BLOOD PRESSURE: 150 MMHG | DIASTOLIC BLOOD PRESSURE: 84 MMHG | HEIGHT: 61 IN | BODY MASS INDEX: 25.77 KG/M2

## 2020-01-17 DIAGNOSIS — E04.2 GOITER, NONTOXIC, MULTINODULAR: ICD-10-CM

## 2020-01-17 DIAGNOSIS — D75.839 THROMBOCYTOSIS: ICD-10-CM

## 2020-01-17 DIAGNOSIS — E03.9 HYPOTHYROIDISM, UNSPECIFIED TYPE: ICD-10-CM

## 2020-01-17 DIAGNOSIS — I10 ESSENTIAL HYPERTENSION: Primary | ICD-10-CM

## 2020-01-17 DIAGNOSIS — N36.2 URETHRAL CARUNCLE: ICD-10-CM

## 2020-01-17 DIAGNOSIS — D63.8 ANEMIA OF CHRONIC DISEASE: ICD-10-CM

## 2020-01-17 PROBLEM — R22.1 NECK MASS: Status: RESOLVED | Noted: 2018-10-05 | Resolved: 2020-01-17

## 2020-01-17 PROCEDURE — 99214 OFFICE O/P EST MOD 30 MIN: CPT | Mod: PBBFAC,PN | Performed by: INTERNAL MEDICINE

## 2020-01-17 PROCEDURE — 1159F PR MEDICATION LIST DOCUMENTED IN MEDICAL RECORD: ICD-10-PCS | Mod: ,,, | Performed by: INTERNAL MEDICINE

## 2020-01-17 PROCEDURE — 1126F PR PAIN SEVERITY QUANTIFIED, NO PAIN PRESENT: ICD-10-PCS | Mod: ,,, | Performed by: INTERNAL MEDICINE

## 2020-01-17 PROCEDURE — 1159F MED LIST DOCD IN RCRD: CPT | Mod: ,,, | Performed by: INTERNAL MEDICINE

## 2020-01-17 PROCEDURE — 99214 OFFICE O/P EST MOD 30 MIN: CPT | Mod: S$PBB,,, | Performed by: INTERNAL MEDICINE

## 2020-01-17 PROCEDURE — 1126F AMNT PAIN NOTED NONE PRSNT: CPT | Mod: ,,, | Performed by: INTERNAL MEDICINE

## 2020-01-17 PROCEDURE — 99999 PR PBB SHADOW E&M-EST. PATIENT-LVL IV: ICD-10-PCS | Mod: PBBFAC,,, | Performed by: INTERNAL MEDICINE

## 2020-01-17 PROCEDURE — 99999 PR PBB SHADOW E&M-EST. PATIENT-LVL IV: CPT | Mod: PBBFAC,,, | Performed by: INTERNAL MEDICINE

## 2020-01-17 PROCEDURE — 99214 PR OFFICE/OUTPT VISIT, EST, LEVL IV, 30-39 MIN: ICD-10-PCS | Mod: S$PBB,,, | Performed by: INTERNAL MEDICINE

## 2020-01-17 RX ORDER — AMLODIPINE BESYLATE 5 MG/1
5 TABLET ORAL DAILY
Qty: 30 TABLET | Refills: 3 | Status: SHIPPED | OUTPATIENT
Start: 2020-01-17 | End: 2020-04-23 | Stop reason: SDUPTHER

## 2020-01-17 RX ORDER — LEVOTHYROXINE SODIUM 50 UG/1
50 CAPSULE ORAL DAILY
Qty: 30 TABLET | Refills: 3
Start: 2020-01-17 | End: 2022-02-02 | Stop reason: SDUPTHER

## 2020-01-17 NOTE — PATIENT INSTRUCTIONS
We have reviewed your prescription medications.   You did not want a flu or a pneumonia shot.   Your endocrinologist ordered labs. I will also add a cbc to be drawn.   I am adding amlodipine for high blood pressure. Take it once a day.  You can also try taking your thyroid medicine at night instead of morning since you are having nausea in the daytime.

## 2020-01-20 NOTE — PROGRESS NOTES
Subjective:       Patient ID: Joanne Daniel is a 79 y.o. female.    Chief Complaint: Follow-up and Dizziness     I have reviewed the PMH,  and  for this patient    She  has a past medical history of Foot drop, left foot, Glaucoma, Hyperlipidemia, Urinary tract infection, and Vaginal infection.     The patient presents today for follow-up of chronic conditions. She is here for evaluation of dizziness and blurred vision.She does not want a flu shot or a pneumonia shot. Thursday, she got dizzy when she was looking up at a sign and she could not read the letters. Her BP was high at the time -- 160/89. She wonders if her thyroid pill is making her feel bad. She has been feeling nauseated since she started the thyroid pill. She takes the pill in the am and the nausea wears off by evening. She saw GYN in August of 2019. She has been taking hormone cream ordered by the gynecologist.     Active Ambulatory Problems     Diagnosis Date Noted    Hypercholesterolemia 02/24/2015    Left foot drop 12/19/2016    Urethral caruncle 08/18/2017    Atrophic vaginitis 08/18/2017    Primary osteoarthritis involving multiple joints 09/14/2018    Thrombocytosis 10/02/2018    Iron deficiency anemia 10/05/2018    Subclinical hypothyroidism 10/05/2018    Anemia of chronic disease 10/22/2018    Vaginal irritation 06/10/2019    BMI 25.0-25.9,adult 01/17/2020    Essential hypertension 01/17/2020    Goiter, nontoxic, multinodular 01/17/2020     Resolved Ambulatory Problems     Diagnosis Date Noted    BPPV (benign paroxysmal positional vertigo) 02/24/2015    Shingles 07/14/2015    Age-related nuclear cataract of left eye 01/25/2017    Nocturia 08/18/2017    Neck mass 10/05/2018     Past Medical History:   Diagnosis Date    Foot drop, left foot     Glaucoma     Hyperlipidemia     Urinary tract infection     Vaginal infection          MEDICATIONS:  Current Outpatient Medications:     calcium-mag oxide-vitamin D3 185-  mg-mg-unit Cap, Take by mouth 3 (three) times daily., Disp: , Rfl:     thiamine (VITAMIN B-1) 100 MG tablet, Take 100 mg by mouth once daily., Disp: , Rfl:     amLODIPine (NORVASC) 5 MG tablet, Take 1 tablet (5 mg total) by mouth once daily., Disp: 30 tablet, Rfl: 3    estradiol (ESTRACE) 0.01 % (0.1 mg/gram) vaginal cream, Place 1 g vaginally once daily. For 2 weeks, then apply twice weekly., Disp: 30 g, Rfl: 2    levothyroxine (TIROSINT) 50 mcg Cap, Take 50 mcg by mouth once daily., Disp: 30 tablet, Rfl: 3    nystatin-triamcinolone (MYCOLOG II) cream, Apply topically 4 (four) times daily. for 7 days, Disp: 15 g, Rfl: 1      HEALTH MAINTENANCE:   Health Maintenance   Topic Date Due    Pneumococcal Vaccine (65+ Low/Medium Risk) (2 of 2 - PCV13) 02/24/2016    DEXA SCAN  12/19/2019    Colonoscopy  08/09/2020    Lipid Panel  06/07/2024    TETANUS VACCINE  03/01/2028       Review of Systems   Constitutional: Negative for chills, fatigue and fever.   HENT: Negative for congestion, ear discharge, ear pain, rhinorrhea and sore throat.    Eyes: Negative for discharge, redness and itching.   Respiratory: Negative for cough, chest tightness, shortness of breath and wheezing.    Cardiovascular: Negative for chest pain, palpitations and leg swelling.   Gastrointestinal: Negative for abdominal pain, constipation, diarrhea, nausea and vomiting.   Endocrine: Negative for cold intolerance and heat intolerance.   Genitourinary: Negative for dysuria, flank pain, frequency and hematuria.   Musculoskeletal: Negative for arthralgias, back pain, joint swelling and myalgias.   Skin: Negative for color change and rash.   Neurological: Negative for dizziness, tremors, numbness and headaches.   Psychiatric/Behavioral: Negative for dysphoric mood and sleep disturbance. The patient is not nervous/anxious.        Objective:          A1C:      CBC:  Recent Labs   Lab 09/15/18  0810 10/02/18  1217   WBC 8.53 8.91   RBC 4.02 4.06    Hemoglobin 11.5 L 11.5 L   Hematocrit 37.2 37.4   Platelets 513 H 476 H   Mean Corpuscular Volume 93 92   Mean Corpuscular Hemoglobin 28.6 28.3   Mean Corpuscular Hemoglobin Conc 30.9 L 30.7 L     CMP:  Recent Labs   Lab 09/15/18  0810 06/07/19  0837   Glucose 105 101   Calcium 9.9 9.7   Albumin 4.0 4.2   Total Protein 8.2 8.0   Sodium 142 142   Potassium 4.5 4.6   CO2 27 28   Chloride 105 106   BUN, Bld 15 21 H   Creatinine 0.57 0.61   Alkaline Phosphatase 121 116   ALT 17 23   AST 19 25   Total Bilirubin 0.3 0.5     LIPIDS:  Recent Labs   Lab 09/15/18  0810 06/07/19  0837 08/02/19  0751   TSH 6.040 H 4.840 H 1.810   HDL 57 70  --    Cholesterol 203 H 260 H  --    Triglycerides 68 92  --    LDL Cholesterol 132.4 171.6 H  --    Hdl/Cholesterol Ratio 28.1 26.9  --    Non-HDL Cholesterol 146 190  --    Total Cholesterol/HDL Ratio 3.6 3.7  --      TSH:  Recent Labs   Lab 09/15/18  0810 06/07/19  0837 08/02/19  0751   TSH 6.040 H 4.840 H 1.810           Physical Exam   Constitutional: She appears well-developed and well-nourished. She does not have a sickly appearance.   HENT:   Head: Normocephalic and atraumatic.   Right Ear: External ear normal. Tympanic membrane is not erythematous. No middle ear effusion.   Left Ear: External ear normal. Tympanic membrane is not erythematous.  No middle ear effusion.   Nose: No rhinorrhea. Right sinus exhibits no maxillary sinus tenderness and no frontal sinus tenderness. Left sinus exhibits no maxillary sinus tenderness and no frontal sinus tenderness.   Mouth/Throat: No posterior oropharyngeal edema or posterior oropharyngeal erythema. No tonsillar exudate.   Eyes: Pupils are equal, round, and reactive to light. Conjunctivae and EOM are normal. Right eye exhibits no discharge. Left eye exhibits no discharge. Right conjunctiva is not injected. Left conjunctiva is not injected.   Neck: No thyromegaly present.   Cardiovascular: Normal rate, regular rhythm, normal heart sounds and  intact distal pulses.   No murmur heard.  Pulmonary/Chest: Effort normal and breath sounds normal. She has no wheezes. She has no rhonchi. She has no rales.   Abdominal: Bowel sounds are normal. She exhibits no distension. There is no tenderness.   Musculoskeletal: She exhibits no edema or tenderness.   Lymphadenopathy:     She has no cervical adenopathy.   Neurological: She displays normal reflexes. No cranial nerve deficit.   Skin: Skin is warm and dry. No lesion and no rash noted.   Psychiatric: She has a normal mood and affect. Her behavior is normal. Her mood appears not anxious. Her speech is not rapid and/or pressured. She is not agitated and not aggressive. She does not exhibit a depressed mood.             Assessment and Plan:     Problem List Items Addressed This Visit        Cardiac/Vascular    Essential hypertension - Primary - BP is too high. We will add amlodipine and have her come back for a recheck raul  Month.        Renal/    Urethral caruncle - continue estrogen cream and follow-up with GYN.        Oncology    Thrombocytosis - check CBC    Relevant Orders    CBC auto differential    Anemia of chronic disease - check cbc    Relevant Orders    CBC auto differential       Endocrine    BMI 25.0-25.9,adult - stable.       Goiter, nontoxic, multinodular - she is on a thyroid medicine.       Other Visit Diagnoses     Hypothyroidism, unspecified type     - on thyroid medicine. Try taking it at night because of nausea.           Orders Placed This Encounter    CBC auto differential    levothyroxine (TIROSINT) 50 mcg Cap    amLODIPine (NORVASC) 5 MG tablet         Follow-up with me in 1 month.       Nichole Hahn MD,  FACP  Internal Medicine

## 2020-02-14 ENCOUNTER — CLINICAL SUPPORT (OUTPATIENT)
Dept: FAMILY MEDICINE | Facility: CLINIC | Age: 80
End: 2020-02-14
Payer: MEDICARE

## 2020-02-14 VITALS
BODY MASS INDEX: 25.79 KG/M2 | HEIGHT: 61 IN | HEART RATE: 73 BPM | DIASTOLIC BLOOD PRESSURE: 76 MMHG | OXYGEN SATURATION: 98 % | SYSTOLIC BLOOD PRESSURE: 126 MMHG

## 2020-02-14 PROCEDURE — 99212 OFFICE O/P EST SF 10 MIN: CPT | Mod: PBBFAC,PN

## 2020-02-14 PROCEDURE — 99999 PR PBB SHADOW E&M-EST. PATIENT-LVL II: ICD-10-PCS | Mod: PBBFAC,,,

## 2020-02-14 PROCEDURE — 99999 PR PBB SHADOW E&M-EST. PATIENT-LVL II: CPT | Mod: PBBFAC,,,

## 2020-02-14 NOTE — PROGRESS NOTES
Joanne Daniel 79 y.o. female is here today for Blood Pressure check.   History of HTN yes.    Review of patient's allergies indicates:  No Known Allergies  Creatinine   Date Value Ref Range Status   01/23/2020 0.64 0.50 - 1.40 mg/dL Final     Sodium   Date Value Ref Range Status   01/23/2020 143 136 - 145 mmol/L Final     Potassium   Date Value Ref Range Status   01/23/2020 4.2 3.5 - 5.1 mmol/L Final   ]  Patient verifies taking blood pressure medications on a regular basis at the same time of the day.     Current Outpatient Medications:     amLODIPine (NORVASC) 5 MG tablet, Take 1 tablet (5 mg total) by mouth once daily., Disp: 30 tablet, Rfl: 3    calcium-mag oxide-vitamin D3 185- mg-mg-unit Cap, Take by mouth 3 (three) times daily., Disp: , Rfl:     estradiol (ESTRACE) 0.01 % (0.1 mg/gram) vaginal cream, Place 1 g vaginally once daily. For 2 weeks, then apply twice weekly., Disp: 30 g, Rfl: 2    levothyroxine (TIROSINT) 50 mcg Cap, Take 50 mcg by mouth once daily., Disp: 30 tablet, Rfl: 3    nystatin-triamcinolone (MYCOLOG II) cream, Apply topically 4 (four) times daily. for 7 days, Disp: 15 g, Rfl: 1    thiamine (VITAMIN B-1) 100 MG tablet, Take 100 mg by mouth once daily., Disp: , Rfl:   Does patient have record of home blood pressure readings yes. Readings have been averaging 140/60.   Last dose of blood pressure medication was taken at 2/14/20 about 8:30am.  Patient is asymptomatic.   Complains of knee pain.    BP: 126/76 , Pulse: 73 .      Dr. Hahn notified.

## 2020-03-05 ENCOUNTER — PATIENT OUTREACH (OUTPATIENT)
Dept: ADMINISTRATIVE | Facility: OTHER | Age: 80
End: 2020-03-05

## 2020-03-10 ENCOUNTER — OFFICE VISIT (OUTPATIENT)
Dept: UROLOGY | Facility: CLINIC | Age: 80
End: 2020-03-10
Payer: MEDICARE

## 2020-03-10 VITALS
DIASTOLIC BLOOD PRESSURE: 52 MMHG | OXYGEN SATURATION: 97 % | BODY MASS INDEX: 25.68 KG/M2 | TEMPERATURE: 98 F | RESPIRATION RATE: 17 BRPM | HEIGHT: 61 IN | SYSTOLIC BLOOD PRESSURE: 115 MMHG | WEIGHT: 136 LBS | HEART RATE: 77 BPM

## 2020-03-10 DIAGNOSIS — N95.2 ATROPHIC VAGINITIS: ICD-10-CM

## 2020-03-10 DIAGNOSIS — N36.2 URETHRAL CARUNCLE: Primary | ICD-10-CM

## 2020-03-10 PROBLEM — N89.8 VAGINAL IRRITATION: Status: RESOLVED | Noted: 2019-06-10 | Resolved: 2020-03-10

## 2020-03-10 LAB
BILIRUB SERPL-MCNC: NORMAL MG/DL
BLOOD URINE, POC: NORMAL
COLOR, POC UA: NORMAL
GLUCOSE UR QL STRIP: NORMAL
KETONES UR QL STRIP: NORMAL
LEUKOCYTE ESTERASE URINE, POC: NORMAL
NITRITE, POC UA: NORMAL
PH, POC UA: 5
PROTEIN, POC: NORMAL
SPECIFIC GRAVITY, POC UA: 1.03
UROBILINOGEN, POC UA: 0.2

## 2020-03-10 PROCEDURE — 99214 OFFICE O/P EST MOD 30 MIN: CPT | Mod: PBBFAC,PO | Performed by: NURSE PRACTITIONER

## 2020-03-10 PROCEDURE — 99999 PR PBB SHADOW E&M-EST. PATIENT-LVL IV: CPT | Mod: PBBFAC,,, | Performed by: NURSE PRACTITIONER

## 2020-03-10 PROCEDURE — 99212 PR OFFICE/OUTPT VISIT, EST, LEVL II, 10-19 MIN: ICD-10-PCS | Mod: S$PBB,,, | Performed by: NURSE PRACTITIONER

## 2020-03-10 PROCEDURE — 99999 PR PBB SHADOW E&M-EST. PATIENT-LVL IV: ICD-10-PCS | Mod: PBBFAC,,, | Performed by: NURSE PRACTITIONER

## 2020-03-10 PROCEDURE — 99212 OFFICE O/P EST SF 10 MIN: CPT | Mod: S$PBB,,, | Performed by: NURSE PRACTITIONER

## 2020-03-10 PROCEDURE — 81002 URINALYSIS NONAUTO W/O SCOPE: CPT | Mod: PBBFAC,PO | Performed by: NURSE PRACTITIONER

## 2020-03-10 NOTE — PROGRESS NOTES
Subjective:       Patient ID: Joanne Daniel is a 79 y.o. female.    Chief Complaint: Follow-up (6 month check up urethral caruncle)    Patient is new to me. She is a 78 yo WF who is here today for a follow-up for urethral caruncle. Patient reports everything is going well. She has no complaints or issues at this time.     Follow-up   This is a chronic problem. The current episode started more than 1 month ago. The problem occurs daily. The problem has been unchanged. Pertinent negatives include no abdominal pain, anorexia, arthralgias, change in bowel habit, chills, diaphoresis, fatigue, fever, headaches, nausea, swollen glands, urinary symptoms, vomiting or weakness. Nothing aggravates the symptoms. Treatments tried: estrace cream  The treatment provided significant relief.     Review of Systems   Constitutional: Negative for appetite change, chills, diaphoresis, fatigue and fever.   Gastrointestinal: Positive for constipation (taking probiotic). Negative for abdominal pain, anorexia, change in bowel habit, diarrhea, nausea and vomiting.   Genitourinary: Positive for dyspareunia (resolved). Negative for decreased urine volume, difficulty urinating, dysuria, flank pain, frequency, hematuria, urgency, vaginal bleeding, vaginal discharge and vaginal pain.        Urinary leakage with sneezing.   Musculoskeletal: Negative for arthralgias.   Neurological: Negative for dizziness, weakness and headaches.       Objective:      Physical Exam   Constitutional: She is oriented to person, place, and time. She appears well-developed and well-nourished. No distress.   HENT:   Head: Normocephalic and atraumatic.   Eyes: Pupils are equal, round, and reactive to light. EOM are normal.   Neck: Normal range of motion.   Cardiovascular: Normal rate.   Pulmonary/Chest: Effort normal. No respiratory distress.   Abdominal: Soft. There is no tenderness.   Musculoskeletal: Normal range of motion. She exhibits no edema.   Ambulates with  cane.    Neurological: She is alert and oriented to person, place, and time. Coordination normal.   Skin: Skin is warm and dry.   Psychiatric: She has a normal mood and affect. Her behavior is normal. Judgment and thought content normal.   Nursing note and vitals reviewed.      Assessment:       1. Urethral caruncle    2. Atrophic vaginitis        Plan:       Joanne was seen today for follow-up.    Diagnoses and all orders for this visit:    Urethral caruncle  -     POCT URINE DIPSTICK WITHOUT MICROSCOPE    Atrophic vaginitis  -     POCT URINE DIPSTICK WITHOUT MICROSCOPE    Other order  1. Continue estrace cream as previously prescribed (use twice weekly).     Follow-up in 6 months with vaginal exam for urethral caruncle.    Anabel Berumen NP

## 2020-03-10 NOTE — PATIENT INSTRUCTIONS
1. Urine dipstick  2. Continue estrace cream as previously prescribed (use twice weekly).   3. Follow-up in 6 months with vaginal exam for urethral caruncle.

## 2020-04-23 RX ORDER — AMLODIPINE BESYLATE 5 MG/1
5 TABLET ORAL DAILY
Qty: 30 TABLET | Refills: 3 | Status: SHIPPED | OUTPATIENT
Start: 2020-04-23 | End: 2020-09-03 | Stop reason: SDUPTHER

## 2020-06-02 ENCOUNTER — PATIENT OUTREACH (OUTPATIENT)
Dept: ADMINISTRATIVE | Facility: HOSPITAL | Age: 80
End: 2020-06-02

## 2020-07-17 DIAGNOSIS — Z71.89 COMPLEX CARE COORDINATION: ICD-10-CM

## 2020-09-03 ENCOUNTER — OFFICE VISIT (OUTPATIENT)
Dept: FAMILY MEDICINE | Facility: CLINIC | Age: 80
End: 2020-09-03
Payer: MEDICARE

## 2020-09-03 VITALS
WEIGHT: 128.63 LBS | OXYGEN SATURATION: 98 % | DIASTOLIC BLOOD PRESSURE: 72 MMHG | SYSTOLIC BLOOD PRESSURE: 108 MMHG | HEIGHT: 61 IN | HEART RATE: 63 BPM | TEMPERATURE: 99 F | BODY MASS INDEX: 24.29 KG/M2

## 2020-09-03 DIAGNOSIS — D50.9 IRON DEFICIENCY ANEMIA, UNSPECIFIED IRON DEFICIENCY ANEMIA TYPE: ICD-10-CM

## 2020-09-03 DIAGNOSIS — I10 ESSENTIAL HYPERTENSION: Primary | ICD-10-CM

## 2020-09-03 DIAGNOSIS — E78.00 HYPERCHOLESTEROLEMIA: ICD-10-CM

## 2020-09-03 DIAGNOSIS — E04.2 GOITER, NONTOXIC, MULTINODULAR: ICD-10-CM

## 2020-09-03 DIAGNOSIS — E03.8 SUBCLINICAL HYPOTHYROIDISM: ICD-10-CM

## 2020-09-03 DIAGNOSIS — M17.0 PRIMARY OSTEOARTHRITIS OF BOTH KNEES: ICD-10-CM

## 2020-09-03 PROCEDURE — 99214 PR OFFICE/OUTPT VISIT, EST, LEVL IV, 30-39 MIN: ICD-10-PCS | Mod: S$PBB,,, | Performed by: INTERNAL MEDICINE

## 2020-09-03 PROCEDURE — 99999 PR PBB SHADOW E&M-EST. PATIENT-LVL IV: ICD-10-PCS | Mod: PBBFAC,,, | Performed by: INTERNAL MEDICINE

## 2020-09-03 PROCEDURE — 99999 PR PBB SHADOW E&M-EST. PATIENT-LVL IV: CPT | Mod: PBBFAC,,, | Performed by: INTERNAL MEDICINE

## 2020-09-03 PROCEDURE — 99214 OFFICE O/P EST MOD 30 MIN: CPT | Mod: S$PBB,,, | Performed by: INTERNAL MEDICINE

## 2020-09-03 PROCEDURE — 99214 OFFICE O/P EST MOD 30 MIN: CPT | Mod: PBBFAC,PN | Performed by: INTERNAL MEDICINE

## 2020-09-03 RX ORDER — ROSUVASTATIN CALCIUM 5 MG/1
5 TABLET, COATED ORAL
COMMUNITY
Start: 2020-07-06 | End: 2021-03-02

## 2020-09-03 RX ORDER — AMLODIPINE BESYLATE 5 MG/1
5 TABLET ORAL DAILY
Qty: 90 TABLET | Refills: 1 | Status: SHIPPED | OUTPATIENT
Start: 2020-09-03 | End: 2021-03-01 | Stop reason: SDUPTHER

## 2020-09-03 NOTE — PATIENT INSTRUCTIONS
We have reviewed your prescription medications.   We will order routine labs.   Your BP looks good. I refilled your medicine.  Follow-up with cardiology for tests as recommended.  Follow-up with endocrinology as recommended.  Try doing leg lifts to strengthen muscles around your knees and take tylenol for pain if needed.

## 2020-09-10 NOTE — PROGRESS NOTES
Subjective:       Patient ID: Joanne Daniel is a 80 y.o. female.    Chief Complaint: Medication Refill     I have reviewed the PM,  and  for this patient    She  has a past medical history of Foot drop, left foot, Glaucoma, Hyperlipidemia, Urinary tract infection, and Vaginal infection.     The patient presents today for follow-up of chronic conditions.  She reports that she has been out of her blood pressure medicines.  She has been checking her blood pressure at home and it was running good before she was out of her medicine.  It had been 108/51 to 129/61 according to her meter.  She sees a cardiologist and an endocrinologist.  Her cardiologist has recommended that she do a nuclear stress test and an echocardiogram.  I agree that she should do the test that he has ordered.  She complains that she had to stop her blood thinners because it caused rectal bleeding.  She has a history of hemorrhoids.  She uses preparation H and it seems to keep him under control she does not have rectal bleeding so long as she is not on blood thinners.  She is due for a bone density and a colonoscopy.  Her last blood work in May was good.  Blood counts and blood chemistries were normal.  Her cholesterol was elevated with an LDL of 153.  She is taking Crestor.  Her vitamin-D was good at 73.  Her thyroid test was normal.  Her blood pressure today was good at 108/72.  She does take a baby aspirin every day.  She also complains of some pain and stiffness in her knees.  She does not want any shots in her knees.  She just wants conservative therapy at this time.    The patient denies chest pain, shortness of breath, nausea, or dizziness.     Active Ambulatory Problems     Diagnosis Date Noted    Hypercholesterolemia 02/24/2015    Left foot drop 12/19/2016    Urethral caruncle 08/18/2017    Atrophic vaginitis 08/18/2017    Primary osteoarthritis involving multiple joints 09/14/2018    Thrombocytosis 10/02/2018    Iron deficiency  anemia 10/05/2018    Subclinical hypothyroidism 10/05/2018    Anemia of chronic disease 10/22/2018    BMI 25.0-25.9,adult 01/17/2020    Essential hypertension 01/17/2020    Goiter, nontoxic, multinodular 01/17/2020    Primary osteoarthritis of both knees 09/03/2020     Resolved Ambulatory Problems     Diagnosis Date Noted    BPPV (benign paroxysmal positional vertigo) 02/24/2015    Shingles 07/14/2015    Age-related nuclear cataract of left eye 01/25/2017    Nocturia 08/18/2017    Neck mass 10/05/2018    Vaginal irritation 06/10/2019     Past Medical History:   Diagnosis Date    Foot drop, left foot     Glaucoma     Hyperlipidemia     Urinary tract infection     Vaginal infection          MEDICATIONS:  Current Outpatient Medications:     amLODIPine (NORVASC) 5 MG tablet, Take 1 tablet (5 mg total) by mouth once daily., Disp: 90 tablet, Rfl: 1    levothyroxine (TIROSINT) 50 mcg Cap, Take 50 mcg by mouth once daily., Disp: 30 tablet, Rfl: 3    rosuvastatin (CRESTOR) 5 MG tablet, Take 5 mg by mouth. Take 1 tablet by mouth every other night, Disp: , Rfl:     thiamine (VITAMIN B-1) 100 MG tablet, Take 100 mg by mouth once daily., Disp: , Rfl:     calcium-mag oxide-vitamin D3 185- mg-mg-unit Cap, Take by mouth 3 (three) times daily., Disp: , Rfl:     estradiol (ESTRACE) 0.01 % (0.1 mg/gram) vaginal cream, Place 1 g vaginally once daily. For 2 weeks, then apply twice weekly., Disp: 30 g, Rfl: 2    nystatin-triamcinolone (MYCOLOG II) cream, Apply topically 4 (four) times daily. for 7 days, Disp: 15 g, Rfl: 1      HEALTH MAINTENANCE:   Health Maintenance   Topic Date Due    Pneumococcal Vaccine (65+ Low/Medium Risk) (2 of 2 - PCV13) 02/24/2016    DEXA SCAN  12/19/2019    Colonoscopy  08/09/2020    Lipid Panel  05/18/2025    TETANUS VACCINE  03/01/2028       Review of Systems   Constitutional: Negative for chills, fatigue and fever.   HENT: Negative for congestion, ear discharge, ear pain,  rhinorrhea and sore throat.    Eyes: Negative for discharge, redness and itching.   Respiratory: Negative for cough, chest tightness, shortness of breath and wheezing.    Cardiovascular: Negative for chest pain, palpitations and leg swelling.   Gastrointestinal: Negative for abdominal pain, constipation, diarrhea, nausea and vomiting.   Endocrine: Negative for cold intolerance and heat intolerance.   Genitourinary: Negative for dysuria, flank pain, frequency and hematuria.   Musculoskeletal: Positive for arthralgias. Negative for back pain, joint swelling and myalgias.   Skin: Negative for color change and rash.   Neurological: Negative for dizziness, tremors, numbness and headaches.   Psychiatric/Behavioral: Negative for dysphoric mood and sleep disturbance. The patient is not nervous/anxious.        Objective:          A1C:      CBC:  Recent Labs   Lab 09/15/18  0810 10/02/18  1217 01/23/20  0829 05/15/20  0917   WBC 8.53 8.91 7.08 7.79   RBC 4.02 4.06 4.57 4.21   Hemoglobin 11.5 L 11.5 L 13.2 12.3   Hematocrit 37.2 37.4 42.6 38.9   Platelets 513 H 476 H 244 291   Mean Corpuscular Volume 93 92 93 92   Mean Corpuscular Hemoglobin 28.6 28.3 28.9 29.2   Mean Corpuscular Hemoglobin Conc 30.9 L 30.7 L 31.0 L 31.6 L     CMP:  Recent Labs   Lab 09/15/18  0810 06/07/19  0837 01/23/20  0829 05/18/20  0835   Glucose 105 101 101 104   Calcium 9.9 9.7 9.4 9.9   Albumin 4.0 4.2 4.5 4.2   Total Protein 8.2 8.0 8.3 7.8   Sodium 142 142 143 141   Potassium 4.5 4.6 4.2 4.4   CO2 27 28 24 27   Chloride 105 106 105 106   BUN, Bld 15 21 H 16 13   Creatinine 0.57 0.61 0.64 0.63   Alkaline Phosphatase 121 116 149 H 124   ALT 17 23 23 20   AST 19 25 28 26   Total Bilirubin 0.3 0.5 0.5 0.5     LIPIDS:  Recent Labs   Lab 09/15/18  0810 06/07/19  0837 08/02/19  0751 01/23/20  0829 05/18/20  0835   TSH 6.040 H 4.840 H 1.810 2.670 2.210   HDL 57 70  --  74 72   Cholesterol 203 H 260 H  --  232 H 240 H   Triglycerides 68 92  --  66 73   LDL  Cholesterol 132.4 171.6 H  --  144.8 153.4   Hdl/Cholesterol Ratio 28.1 26.9  --  31.9 30.0   Non-HDL Cholesterol 146 190  --  158 168   Total Cholesterol/HDL Ratio 3.6 3.7  --  3.1 3.3     TSH:  Recent Labs   Lab 09/15/18  0810 06/07/19  0837 08/02/19  0751 01/23/20  0829 05/18/20  0835   TSH 6.040 H 4.840 H 1.810 2.670 2.210           Physical Exam  Constitutional:       Appearance: She is well-developed.   HENT:      Head: Normocephalic and atraumatic.      Right Ear: External ear normal. No middle ear effusion. Tympanic membrane is not erythematous.      Left Ear: External ear normal.  No middle ear effusion. Tympanic membrane is not erythematous.      Nose: No rhinorrhea.      Right Sinus: No maxillary sinus tenderness or frontal sinus tenderness.      Left Sinus: No maxillary sinus tenderness or frontal sinus tenderness.      Mouth/Throat:      Pharynx: No posterior oropharyngeal erythema.      Tonsils: No tonsillar exudate.   Eyes:      General:         Right eye: No discharge.         Left eye: No discharge.      Conjunctiva/sclera: Conjunctivae normal.      Right eye: Right conjunctiva is not injected.      Left eye: Left conjunctiva is not injected.      Pupils: Pupils are equal, round, and reactive to light.   Neck:      Thyroid: No thyromegaly.   Cardiovascular:      Rate and Rhythm: Normal rate and regular rhythm.      Heart sounds: Normal heart sounds. No murmur.   Pulmonary:      Effort: Pulmonary effort is normal.      Breath sounds: Normal breath sounds. No wheezing, rhonchi or rales.   Abdominal:      General: Bowel sounds are normal. There is no distension.      Tenderness: There is no abdominal tenderness.   Musculoskeletal:         General: No tenderness.      Right knee: She exhibits decreased range of motion.      Left knee: She exhibits decreased range of motion.   Lymphadenopathy:      Cervical: No cervical adenopathy.   Skin:     General: Skin is warm and dry.      Findings: No lesion or  rash.   Neurological:      Cranial Nerves: No cranial nerve deficit.      Deep Tendon Reflexes: Reflexes normal.   Psychiatric:         Mood and Affect: Mood is not anxious or depressed.         Speech: Speech is not rapid and pressured.         Behavior: Behavior normal. Behavior is not agitated or aggressive.               Assessment and Plan:     Problem List Items Addressed This Visit        Cardiac/Vascular    Hypercholesterolemia- she currently takes Crestor.  Check labs.      Essential hypertension - Primary- BP looks good. Continue current medications. We will check labs. Continue to work on diet to reduce salt and exercise 3 times a week for 30 minutes a day.       Oncology    Iron deficiency anemia- anemia has been improving.  Continue iron pills.       Endocrine     hypothyroidism- she is on thyroid medicine now.  Last TSH was normal.      Goiter, nontoxic, multinodular- stable.  Watch for now.  Follow-up with endocrinology.       Orthopedic    Primary osteoarthritis of both knees- try doing exercises to increase strength of muscles around knees.  Take Tylenol if needed for pain.      Other Visit Diagnoses     BMI 24.0-24.9, adult    - her weight has been decreasing.  Keep a watch on this.          Orders Placed This Encounter    amLODIPine (NORVASC) 5 MG tablet         Follow-up  in 6 months.       Nichole Hahn MD,  FACP  Internal Medicine

## 2020-09-13 ENCOUNTER — PATIENT OUTREACH (OUTPATIENT)
Dept: ADMINISTRATIVE | Facility: OTHER | Age: 80
End: 2020-09-13

## 2020-09-14 NOTE — PROGRESS NOTES
Health Maintenance Due   Topic Date Due    Shingles Vaccine (2 of 3) 04/21/2015    Pneumococcal Vaccine (65+ Low/Medium Risk) (2 of 2 - PCV13) 02/24/2016    DEXA SCAN  12/19/2019    Influenza Vaccine (1) 08/01/2020     Updates were requested from care everywhere.  Chart was reviewed for overdue Proactive [facility] Encounters ([facility]) topics (CRS, Breast Cancer Screening, Eye exam)  Health Maintenance has been updated.  [place] immunization registry triggered.  Immunizations were reconciled.

## 2020-09-23 ENCOUNTER — OFFICE VISIT (OUTPATIENT)
Dept: UROLOGY | Facility: CLINIC | Age: 80
End: 2020-09-23
Payer: MEDICARE

## 2020-09-23 VITALS
DIASTOLIC BLOOD PRESSURE: 57 MMHG | HEART RATE: 68 BPM | WEIGHT: 130.19 LBS | BODY MASS INDEX: 24.58 KG/M2 | HEIGHT: 61 IN | SYSTOLIC BLOOD PRESSURE: 125 MMHG | TEMPERATURE: 98 F

## 2020-09-23 DIAGNOSIS — N95.2 ATROPHIC VAGINITIS: ICD-10-CM

## 2020-09-23 DIAGNOSIS — R35.1 NOCTURIA: ICD-10-CM

## 2020-09-23 DIAGNOSIS — N36.2 URETHRAL CARUNCLE: Primary | ICD-10-CM

## 2020-09-23 LAB
BILIRUB SERPL-MCNC: NORMAL MG/DL
BLOOD URINE, POC: NORMAL
CLARITY, POC UA: NORMAL
COLOR, POC UA: YELLOW
GLUCOSE UR QL STRIP: NORMAL
KETONES UR QL STRIP: NORMAL
LEUKOCYTE ESTERASE URINE, POC: NORMAL
NITRITE, POC UA: NORMAL
PH, POC UA: 5
PROTEIN, POC: NORMAL
SPECIFIC GRAVITY, POC UA: 1.03
UROBILINOGEN, POC UA: 0.2

## 2020-09-23 PROCEDURE — 81002 URINALYSIS NONAUTO W/O SCOPE: CPT | Mod: PBBFAC,PO | Performed by: NURSE PRACTITIONER

## 2020-09-23 PROCEDURE — 99999 PR PBB SHADOW E&M-EST. PATIENT-LVL IV: CPT | Mod: PBBFAC,,, | Performed by: NURSE PRACTITIONER

## 2020-09-23 PROCEDURE — 99213 PR OFFICE/OUTPT VISIT, EST, LEVL III, 20-29 MIN: ICD-10-PCS | Mod: S$PBB,,, | Performed by: NURSE PRACTITIONER

## 2020-09-23 PROCEDURE — 99999 PR PBB SHADOW E&M-EST. PATIENT-LVL IV: ICD-10-PCS | Mod: PBBFAC,,, | Performed by: NURSE PRACTITIONER

## 2020-09-23 PROCEDURE — 99213 OFFICE O/P EST LOW 20 MIN: CPT | Mod: S$PBB,,, | Performed by: NURSE PRACTITIONER

## 2020-09-23 PROCEDURE — 99214 OFFICE O/P EST MOD 30 MIN: CPT | Mod: PBBFAC,PO | Performed by: NURSE PRACTITIONER

## 2020-09-23 RX ORDER — ESTRADIOL 0.1 MG/G
1 CREAM VAGINAL
Qty: 8 G | Refills: 11 | Status: CANCELLED | OUTPATIENT
Start: 2020-09-24 | End: 2021-09-24

## 2020-09-23 NOTE — PATIENT INSTRUCTIONS
1. Urine dipstick  2. Continue estrace cream as previously prescribed (use twice weekly).   3. Follow-up in 1 year with vaginal exam for urethral caruncle.

## 2020-09-23 NOTE — PROGRESS NOTES
Subjective:       Patient ID: Joanne Daniel is a 80 y.o. female.    Chief Complaint: Follow-up (urethral caruncle)    Patient is here today for a 6 months f/u for urethral caruncle. She reports everything is going well and has no complaints at this time.   Follow-up  This is a chronic (urethral caruncle) problem. The current episode started more than 1 year ago. The problem occurs daily. The problem has been unchanged. Associated symptoms include arthralgias. Pertinent negatives include no abdominal pain, change in bowel habit, chills, diaphoresis, fatigue, fever, headaches, nausea, swollen glands, urinary symptoms, vomiting or weakness. Nothing aggravates the symptoms. Treatments tried: estrace cream. The treatment provided significant relief.     Review of Systems   Constitutional: Negative for appetite change, chills, diaphoresis, fatigue and fever.   Gastrointestinal: Negative for abdominal pain, change in bowel habit, constipation, nausea, vomiting, reflux and change in bowel habit.   Genitourinary: Negative for decreased urine volume, difficulty urinating, dysuria, flank pain, frequency, hematuria, urgency, vaginal bleeding, vaginal discharge and vaginal pain.        Nocturia x2   Musculoskeletal: Positive for arthralgias.   Neurological: Negative for dizziness, weakness and headaches.   Psychiatric/Behavioral: Negative.          Objective:      Physical Exam  Vitals signs and nursing note reviewed.   Constitutional:       General: She is not in acute distress.     Appearance: She is well-developed. She is not ill-appearing.   HENT:      Head: Normocephalic and atraumatic.   Eyes:      Pupils: Pupils are equal, round, and reactive to light.   Neck:      Musculoskeletal: Normal range of motion.   Cardiovascular:      Rate and Rhythm: Normal rate.   Pulmonary:      Effort: Pulmonary effort is normal. No respiratory distress.   Abdominal:      Palpations: Abdomen is soft.      Tenderness: There is no abdominal  tenderness.   Genitourinary:      Musculoskeletal: Normal range of motion.      Comments: Ambulates with a cane.    Skin:     General: Skin is warm and dry.   Neurological:      Mental Status: She is alert and oriented to person, place, and time.      Coordination: Coordination normal.   Psychiatric:         Mood and Affect: Mood normal.         Behavior: Behavior normal.         Thought Content: Thought content normal.         Judgment: Judgment normal.         Assessment:       1. Urethral caruncle    2. Atrophic vaginitis    3. Nocturia        Plan:       Joanne was seen today for follow-up.    Diagnoses and all orders for this visit:    Urethral caruncle  -     POCT URINE DIPSTICK WITHOUT MICROSCOPE    Atrophic vaginitis  -     POCT URINE DIPSTICK WITHOUT MICROSCOPE    Nocturia  -     POCT URINE DIPSTICK WITHOUT MICROSCOPE    Other order  1. Continue estrace cream as previously prescribed (use twice weekly).     Follow-up in 1 year with vaginal exam for urethral caruncle.    Anabel Berumen NP

## 2020-09-29 ENCOUNTER — PATIENT MESSAGE (OUTPATIENT)
Dept: OTHER | Facility: OTHER | Age: 80
End: 2020-09-29

## 2020-12-10 ENCOUNTER — TELEPHONE (OUTPATIENT)
Dept: UROLOGY | Facility: CLINIC | Age: 80
End: 2020-12-10

## 2020-12-10 NOTE — TELEPHONE ENCOUNTER
----- Message from Yamileth Horvath sent at 12/10/2020 10:34 AM CST -----  Regarding: same day appt  Contact: Pt  Type:  Same Day Appointment Request    Caller is requesting a same day appointment.  Caller declined first available appointment listed below.    Name of Caller:Pt  When is the first available appointment?12/28/2020  Symptoms:Sharp Pain  Best Call Back Number:[phone]  Additional Information: n/a

## 2020-12-10 NOTE — TELEPHONE ENCOUNTER
Spoke to pt on phone. Pt states she was told by Anabel to schedule an appointment if she had any pain or issues urinating. Pt states she is having pain in her vaginal area and would like to be seen. Scheduled pt for the next available and placed on wait list for sooner appointment.

## 2020-12-21 ENCOUNTER — OFFICE VISIT (OUTPATIENT)
Dept: UROLOGY | Facility: CLINIC | Age: 80
End: 2020-12-21
Payer: MEDICARE

## 2020-12-21 VITALS
HEART RATE: 77 BPM | DIASTOLIC BLOOD PRESSURE: 60 MMHG | HEIGHT: 61 IN | BODY MASS INDEX: 24.55 KG/M2 | TEMPERATURE: 97 F | RESPIRATION RATE: 18 BRPM | SYSTOLIC BLOOD PRESSURE: 130 MMHG | OXYGEN SATURATION: 98 % | WEIGHT: 130 LBS

## 2020-12-21 DIAGNOSIS — R39.14 FEELING OF INCOMPLETE BLADDER EMPTYING: Primary | ICD-10-CM

## 2020-12-21 DIAGNOSIS — R35.1 NOCTURIA: ICD-10-CM

## 2020-12-21 LAB
BILIRUB SERPL-MCNC: NORMAL MG/DL
BLOOD URINE, POC: NORMAL
CLARITY, POC UA: CLEAR
COLOR, POC UA: YELLOW
GLUCOSE UR QL STRIP: NORMAL
KETONES UR QL STRIP: NORMAL
LEUKOCYTE ESTERASE URINE, POC: NORMAL
NITRITE, POC UA: NORMAL
PH, POC UA: 7.5
POC RESIDUAL URINE VOLUME: 21 ML (ref 0–100)
PROTEIN, POC: NORMAL
SPECIFIC GRAVITY, POC UA: 1.02
UROBILINOGEN, POC UA: 0.2

## 2020-12-21 PROCEDURE — 99214 OFFICE O/P EST MOD 30 MIN: CPT | Mod: PBBFAC,PO | Performed by: NURSE PRACTITIONER

## 2020-12-21 PROCEDURE — 51798 US URINE CAPACITY MEASURE: CPT | Mod: PBBFAC,PO | Performed by: NURSE PRACTITIONER

## 2020-12-21 PROCEDURE — 81002 URINALYSIS NONAUTO W/O SCOPE: CPT | Mod: PBBFAC,PO | Performed by: NURSE PRACTITIONER

## 2020-12-21 PROCEDURE — 99213 PR OFFICE/OUTPT VISIT, EST, LEVL III, 20-29 MIN: ICD-10-PCS | Mod: S$PBB,,, | Performed by: NURSE PRACTITIONER

## 2020-12-21 PROCEDURE — 99213 OFFICE O/P EST LOW 20 MIN: CPT | Mod: S$PBB,,, | Performed by: NURSE PRACTITIONER

## 2020-12-21 PROCEDURE — 99999 PR PBB SHADOW E&M-EST. PATIENT-LVL IV: ICD-10-PCS | Mod: PBBFAC,,, | Performed by: NURSE PRACTITIONER

## 2020-12-21 PROCEDURE — 99999 PR PBB SHADOW E&M-EST. PATIENT-LVL IV: CPT | Mod: PBBFAC,,, | Performed by: NURSE PRACTITIONER

## 2020-12-21 PROCEDURE — 87086 URINE CULTURE/COLONY COUNT: CPT

## 2020-12-21 NOTE — PATIENT INSTRUCTIONS
1. Urine dipstick  2. Urine cx  3. Bladder scan (PVR)  4. Follow-up pending urine cx results.    Attending

## 2020-12-21 NOTE — PROGRESS NOTES
Subjective:       Patient ID: Joanne Daniel is a 80 y.o. female.    Chief Complaint: Other (feeling of incomplete bladder emptying) and Nocturia    Patient is here today with c/o feeling of incomplete bladder emptying sometimes and that her body feels a little off.      Other  This is a new (feeling of incomplete bladder emptying sometimes.) problem. The current episode started 1 to 4 weeks ago. The problem occurs intermittently. The problem has been waxing and waning. Associated symptoms include arthralgias, fatigue, urinary symptoms and weakness. Pertinent negatives include no abdominal pain, anorexia, change in bowel habit, chills, fever, headaches, nausea, swollen glands or vomiting. Nothing aggravates the symptoms. She has tried nothing for the symptoms.     Review of Systems   Constitutional: Positive for fatigue. Negative for appetite change, chills and fever.   Gastrointestinal: Negative for abdominal pain, anorexia, change in bowel habit, constipation, diarrhea, nausea, vomiting and change in bowel habit.   Genitourinary: Positive for nocturia (x1). Negative for bladder incontinence, decreased urine volume, difficulty urinating, dysuria, flank pain, frequency, hematuria, pelvic pain, urgency, vaginal bleeding, vaginal discharge and vaginal pain.        Feeling of incomplete bladder emptying sometimes.   Musculoskeletal: Positive for arthralgias.   Neurological: Positive for weakness. Negative for dizziness and headaches.   Psychiatric/Behavioral: Negative.          Objective:      Physical Exam  Vitals signs and nursing note reviewed.   Constitutional:       General: She is not in acute distress.     Appearance: She is well-developed. She is not ill-appearing.   HENT:      Head: Normocephalic and atraumatic.   Eyes:      Pupils: Pupils are equal, round, and reactive to light.   Neck:      Musculoskeletal: Normal range of motion.   Cardiovascular:      Rate and Rhythm: Normal rate.   Pulmonary:      Effort:  Pulmonary effort is normal. No respiratory distress.   Abdominal:      Palpations: Abdomen is soft.      Tenderness: There is no abdominal tenderness.   Genitourinary:     Comments: PVR = 21 mL  Musculoskeletal: Normal range of motion.      Comments: Ambulates with cane.   Skin:     General: Skin is warm and dry.   Neurological:      Mental Status: She is alert and oriented to person, place, and time.      Coordination: Coordination normal.   Psychiatric:         Mood and Affect: Mood normal.         Behavior: Behavior normal.         Thought Content: Thought content normal.         Judgment: Judgment normal.         Assessment:       1. Feeling of incomplete bladder emptying    2. Nocturia        Plan:       Joanne was seen today for other and nocturia.    Diagnoses and all orders for this visit:    Feeling of incomplete bladder emptying  -     POCT URINE DIPSTICK WITHOUT MICROSCOPE  -     Urine culture  -     POCT Bladder Scan    Nocturia  -     POCT URINE DIPSTICK WITHOUT MICROSCOPE  -     Urine culture  -     POCT Bladder Scan    Follow-up pending urine cx results.    Anabel Berumen NP

## 2020-12-23 ENCOUNTER — TELEPHONE (OUTPATIENT)
Dept: UROLOGY | Facility: CLINIC | Age: 80
End: 2020-12-23

## 2020-12-23 LAB
BACTERIA UR CULT: NORMAL
BACTERIA UR CULT: NORMAL

## 2020-12-23 NOTE — TELEPHONE ENCOUNTER
----- Message from Anabel Berumen NP sent at 12/23/2020 11:14 AM CST -----  Please inform patient her urine cx showed some bacteria, but none in predominance to diagnose a UTI. If urinary symptoms are still present, patient will need an I&O cath urine for repeat urine cx.

## 2021-03-01 PROBLEM — D63.8 ANEMIA OF CHRONIC DISEASE: Status: RESOLVED | Noted: 2018-10-22 | Resolved: 2021-03-01

## 2021-03-01 PROBLEM — E03.9 HYPOTHYROIDISM: Status: ACTIVE | Noted: 2018-10-05

## 2021-03-01 PROBLEM — D50.9 IRON DEFICIENCY ANEMIA: Status: RESOLVED | Noted: 2018-10-05 | Resolved: 2021-03-01

## 2021-03-01 PROBLEM — D75.839 THROMBOCYTOSIS: Status: RESOLVED | Noted: 2018-10-02 | Resolved: 2021-03-01

## 2021-03-01 RX ORDER — ZOSTER VACCINE RECOMBINANT, ADJUVANTED 50 MCG/0.5
KIT INTRAMUSCULAR
Qty: 1 EACH | Refills: 1 | Status: CANCELLED | OUTPATIENT
Start: 2021-03-01

## 2021-03-02 ENCOUNTER — OFFICE VISIT (OUTPATIENT)
Dept: FAMILY MEDICINE | Facility: CLINIC | Age: 81
End: 2021-03-02
Payer: MEDICARE

## 2021-03-02 ENCOUNTER — TELEPHONE (OUTPATIENT)
Dept: UROLOGY | Facility: CLINIC | Age: 81
End: 2021-03-02

## 2021-03-02 VITALS
OXYGEN SATURATION: 97 % | RESPIRATION RATE: 18 BRPM | SYSTOLIC BLOOD PRESSURE: 130 MMHG | BODY MASS INDEX: 24.94 KG/M2 | WEIGHT: 132.13 LBS | HEART RATE: 68 BPM | HEIGHT: 61 IN | DIASTOLIC BLOOD PRESSURE: 78 MMHG | TEMPERATURE: 98 F

## 2021-03-02 DIAGNOSIS — G62.9 NEUROPATHY: ICD-10-CM

## 2021-03-02 DIAGNOSIS — M21.372 LEFT FOOT DROP: ICD-10-CM

## 2021-03-02 DIAGNOSIS — I10 ESSENTIAL HYPERTENSION: Primary | ICD-10-CM

## 2021-03-02 DIAGNOSIS — Z78.0 POSTMENOPAUSAL: ICD-10-CM

## 2021-03-02 DIAGNOSIS — R10.31 RIGHT LOWER QUADRANT ABDOMINAL PAIN: ICD-10-CM

## 2021-03-02 DIAGNOSIS — E04.2 NONTOXIC MULTINODULAR GOITER: ICD-10-CM

## 2021-03-02 DIAGNOSIS — Z78.9 STATIN INTOLERANCE: ICD-10-CM

## 2021-03-02 DIAGNOSIS — E03.8 OTHER SPECIFIED HYPOTHYROIDISM: ICD-10-CM

## 2021-03-02 DIAGNOSIS — E78.00 PURE HYPERCHOLESTEROLEMIA: ICD-10-CM

## 2021-03-02 LAB
BILIRUB UR QL STRIP: NEGATIVE
CLARITY UR REFRACT.AUTO: CLEAR
COLOR UR AUTO: YELLOW
GLUCOSE UR QL STRIP: NEGATIVE
HGB UR QL STRIP: NEGATIVE
KETONES UR QL STRIP: NEGATIVE
LEUKOCYTE ESTERASE UR QL STRIP: NEGATIVE
NITRITE UR QL STRIP: NEGATIVE
PH UR STRIP: 5 [PH] (ref 5–8)
PROT UR QL STRIP: NEGATIVE
SP GR UR STRIP: 1.01 (ref 1–1.03)
URN SPEC COLLECT METH UR: NORMAL

## 2021-03-02 PROCEDURE — 3078F DIAST BP <80 MM HG: CPT | Mod: CPTII,S$GLB,, | Performed by: FAMILY MEDICINE

## 2021-03-02 PROCEDURE — 1101F PR PT FALLS ASSESS DOC 0-1 FALLS W/OUT INJ PAST YR: ICD-10-PCS | Mod: CPTII,S$GLB,, | Performed by: FAMILY MEDICINE

## 2021-03-02 PROCEDURE — 99214 PR OFFICE/OUTPT VISIT, EST, LEVL IV, 30-39 MIN: ICD-10-PCS | Mod: S$GLB,,, | Performed by: FAMILY MEDICINE

## 2021-03-02 PROCEDURE — 99214 OFFICE O/P EST MOD 30 MIN: CPT | Mod: S$GLB,,, | Performed by: FAMILY MEDICINE

## 2021-03-02 PROCEDURE — 1125F AMNT PAIN NOTED PAIN PRSNT: CPT | Mod: S$GLB,,, | Performed by: FAMILY MEDICINE

## 2021-03-02 PROCEDURE — 1101F PT FALLS ASSESS-DOCD LE1/YR: CPT | Mod: CPTII,S$GLB,, | Performed by: FAMILY MEDICINE

## 2021-03-02 PROCEDURE — 3075F SYST BP GE 130 - 139MM HG: CPT | Mod: CPTII,S$GLB,, | Performed by: FAMILY MEDICINE

## 2021-03-02 PROCEDURE — 3075F PR MOST RECENT SYSTOLIC BLOOD PRESS GE 130-139MM HG: ICD-10-PCS | Mod: CPTII,S$GLB,, | Performed by: FAMILY MEDICINE

## 2021-03-02 PROCEDURE — 3078F PR MOST RECENT DIASTOLIC BLOOD PRESSURE < 80 MM HG: ICD-10-PCS | Mod: CPTII,S$GLB,, | Performed by: FAMILY MEDICINE

## 2021-03-02 PROCEDURE — 3288F FALL RISK ASSESSMENT DOCD: CPT | Mod: CPTII,S$GLB,, | Performed by: FAMILY MEDICINE

## 2021-03-02 PROCEDURE — 1125F PR PAIN SEVERITY QUANTIFIED, PAIN PRESENT: ICD-10-PCS | Mod: S$GLB,,, | Performed by: FAMILY MEDICINE

## 2021-03-02 PROCEDURE — 99999 PR PBB SHADOW E&M-EST. PATIENT-LVL V: ICD-10-PCS | Mod: PBBFAC,,, | Performed by: FAMILY MEDICINE

## 2021-03-02 PROCEDURE — 3288F PR FALLS RISK ASSESSMENT DOCUMENTED: ICD-10-PCS | Mod: CPTII,S$GLB,, | Performed by: FAMILY MEDICINE

## 2021-03-02 PROCEDURE — 1159F PR MEDICATION LIST DOCUMENTED IN MEDICAL RECORD: ICD-10-PCS | Mod: S$GLB,,, | Performed by: FAMILY MEDICINE

## 2021-03-02 PROCEDURE — 99999 PR PBB SHADOW E&M-EST. PATIENT-LVL V: CPT | Mod: PBBFAC,,, | Performed by: FAMILY MEDICINE

## 2021-03-02 PROCEDURE — 81003 URINALYSIS AUTO W/O SCOPE: CPT

## 2021-03-02 PROCEDURE — 1159F MED LIST DOCD IN RCRD: CPT | Mod: S$GLB,,, | Performed by: FAMILY MEDICINE

## 2021-03-02 RX ORDER — AMLODIPINE BESYLATE 5 MG/1
5 TABLET ORAL DAILY
Qty: 90 TABLET | Refills: 3 | Status: SHIPPED | OUTPATIENT
Start: 2021-03-02 | End: 2021-03-30 | Stop reason: SDUPTHER

## 2021-03-03 ENCOUNTER — TELEPHONE (OUTPATIENT)
Dept: FAMILY MEDICINE | Facility: CLINIC | Age: 81
End: 2021-03-03

## 2021-03-03 ENCOUNTER — PATIENT MESSAGE (OUTPATIENT)
Dept: FAMILY MEDICINE | Facility: CLINIC | Age: 81
End: 2021-03-03

## 2021-03-04 ENCOUNTER — TELEPHONE (OUTPATIENT)
Dept: FAMILY MEDICINE | Facility: CLINIC | Age: 81
End: 2021-03-04

## 2021-03-04 ENCOUNTER — PATIENT MESSAGE (OUTPATIENT)
Dept: FAMILY MEDICINE | Facility: CLINIC | Age: 81
End: 2021-03-04

## 2021-03-04 DIAGNOSIS — R10.31 RIGHT LOWER QUADRANT ABDOMINAL PAIN: ICD-10-CM

## 2021-03-04 DIAGNOSIS — M81.0 AGE-RELATED OSTEOPOROSIS WITHOUT CURRENT PATHOLOGICAL FRACTURE: Primary | ICD-10-CM

## 2021-03-04 RX ORDER — ALENDRONATE SODIUM 70 MG/1
70 TABLET ORAL
Qty: 12 TABLET | Refills: 4 | Status: SHIPPED | OUTPATIENT
Start: 2021-03-04 | End: 2021-03-08

## 2021-03-08 ENCOUNTER — TELEPHONE (OUTPATIENT)
Dept: FAMILY MEDICINE | Facility: CLINIC | Age: 81
End: 2021-03-08

## 2021-03-08 DIAGNOSIS — M81.0 AGE-RELATED OSTEOPOROSIS WITHOUT CURRENT PATHOLOGICAL FRACTURE: ICD-10-CM

## 2021-03-12 ENCOUNTER — TELEPHONE (OUTPATIENT)
Dept: OBSTETRICS AND GYNECOLOGY | Facility: CLINIC | Age: 81
End: 2021-03-12

## 2021-03-18 ENCOUNTER — PATIENT OUTREACH (OUTPATIENT)
Dept: ADMINISTRATIVE | Facility: OTHER | Age: 81
End: 2021-03-18

## 2021-03-22 ENCOUNTER — OFFICE VISIT (OUTPATIENT)
Dept: OBSTETRICS AND GYNECOLOGY | Facility: CLINIC | Age: 81
End: 2021-03-22
Payer: MEDICARE

## 2021-03-22 ENCOUNTER — TELEPHONE (OUTPATIENT)
Dept: OBSTETRICS AND GYNECOLOGY | Facility: CLINIC | Age: 81
End: 2021-03-22

## 2021-03-22 VITALS
BODY MASS INDEX: 25.18 KG/M2 | SYSTOLIC BLOOD PRESSURE: 124 MMHG | WEIGHT: 133.38 LBS | DIASTOLIC BLOOD PRESSURE: 64 MMHG | HEIGHT: 61 IN

## 2021-03-22 DIAGNOSIS — R10.2 VAGINAL PAIN: Primary | ICD-10-CM

## 2021-03-22 DIAGNOSIS — N95.2 VAGINAL ATROPHY: ICD-10-CM

## 2021-03-22 PROCEDURE — 1126F PR PAIN SEVERITY QUANTIFIED, NO PAIN PRESENT: ICD-10-PCS | Mod: S$GLB,,, | Performed by: OBSTETRICS & GYNECOLOGY

## 2021-03-22 PROCEDURE — 99999 PR PBB SHADOW E&M-EST. PATIENT-LVL III: CPT | Mod: PBBFAC,,, | Performed by: OBSTETRICS & GYNECOLOGY

## 2021-03-22 PROCEDURE — 3074F PR MOST RECENT SYSTOLIC BLOOD PRESSURE < 130 MM HG: ICD-10-PCS | Mod: CPTII,S$GLB,, | Performed by: OBSTETRICS & GYNECOLOGY

## 2021-03-22 PROCEDURE — 99203 OFFICE O/P NEW LOW 30 MIN: CPT | Mod: S$GLB,,, | Performed by: OBSTETRICS & GYNECOLOGY

## 2021-03-22 PROCEDURE — 99999 PR PBB SHADOW E&M-EST. PATIENT-LVL III: ICD-10-PCS | Mod: PBBFAC,,, | Performed by: OBSTETRICS & GYNECOLOGY

## 2021-03-22 PROCEDURE — 99203 PR OFFICE/OUTPT VISIT, NEW, LEVL III, 30-44 MIN: ICD-10-PCS | Mod: S$GLB,,, | Performed by: OBSTETRICS & GYNECOLOGY

## 2021-03-22 PROCEDURE — 1159F MED LIST DOCD IN RCRD: CPT | Mod: S$GLB,,, | Performed by: OBSTETRICS & GYNECOLOGY

## 2021-03-22 PROCEDURE — 1126F AMNT PAIN NOTED NONE PRSNT: CPT | Mod: S$GLB,,, | Performed by: OBSTETRICS & GYNECOLOGY

## 2021-03-22 PROCEDURE — 3078F PR MOST RECENT DIASTOLIC BLOOD PRESSURE < 80 MM HG: ICD-10-PCS | Mod: CPTII,S$GLB,, | Performed by: OBSTETRICS & GYNECOLOGY

## 2021-03-22 PROCEDURE — 3078F DIAST BP <80 MM HG: CPT | Mod: CPTII,S$GLB,, | Performed by: OBSTETRICS & GYNECOLOGY

## 2021-03-22 PROCEDURE — 1159F PR MEDICATION LIST DOCUMENTED IN MEDICAL RECORD: ICD-10-PCS | Mod: S$GLB,,, | Performed by: OBSTETRICS & GYNECOLOGY

## 2021-03-22 PROCEDURE — 3074F SYST BP LT 130 MM HG: CPT | Mod: CPTII,S$GLB,, | Performed by: OBSTETRICS & GYNECOLOGY

## 2021-03-30 DIAGNOSIS — I10 ESSENTIAL HYPERTENSION: ICD-10-CM

## 2021-03-30 RX ORDER — AMLODIPINE BESYLATE 5 MG/1
5 TABLET ORAL DAILY
Qty: 90 TABLET | Refills: 3 | Status: SHIPPED | OUTPATIENT
Start: 2021-03-30 | End: 2021-10-07

## 2021-05-10 ENCOUNTER — PATIENT OUTREACH (OUTPATIENT)
Dept: ADMINISTRATIVE | Facility: OTHER | Age: 81
End: 2021-05-10

## 2021-05-11 ENCOUNTER — OFFICE VISIT (OUTPATIENT)
Dept: OBSTETRICS AND GYNECOLOGY | Facility: CLINIC | Age: 81
End: 2021-05-11
Payer: MEDICARE

## 2021-05-11 VITALS
DIASTOLIC BLOOD PRESSURE: 82 MMHG | SYSTOLIC BLOOD PRESSURE: 140 MMHG | HEIGHT: 61 IN | BODY MASS INDEX: 25.14 KG/M2 | WEIGHT: 133.19 LBS

## 2021-05-11 DIAGNOSIS — R10.2 VAGINAL PAIN: Primary | ICD-10-CM

## 2021-05-11 DIAGNOSIS — K59.09 CHRONIC CONSTIPATION: ICD-10-CM

## 2021-05-11 DIAGNOSIS — N95.1 MENOPAUSAL VAGINAL DRYNESS: ICD-10-CM

## 2021-05-11 DIAGNOSIS — N95.2 ATROPHIC VAGINITIS: ICD-10-CM

## 2021-05-11 PROCEDURE — 1159F PR MEDICATION LIST DOCUMENTED IN MEDICAL RECORD: ICD-10-PCS | Mod: S$GLB,,, | Performed by: OBSTETRICS & GYNECOLOGY

## 2021-05-11 PROCEDURE — 1159F MED LIST DOCD IN RCRD: CPT | Mod: S$GLB,,, | Performed by: OBSTETRICS & GYNECOLOGY

## 2021-05-11 PROCEDURE — 99999 PR PBB SHADOW E&M-EST. PATIENT-LVL III: CPT | Mod: PBBFAC,,, | Performed by: OBSTETRICS & GYNECOLOGY

## 2021-05-11 PROCEDURE — 99999 PR PBB SHADOW E&M-EST. PATIENT-LVL III: ICD-10-PCS | Mod: PBBFAC,,, | Performed by: OBSTETRICS & GYNECOLOGY

## 2021-05-11 PROCEDURE — 1126F AMNT PAIN NOTED NONE PRSNT: CPT | Mod: S$GLB,,, | Performed by: OBSTETRICS & GYNECOLOGY

## 2021-05-11 PROCEDURE — 99213 PR OFFICE/OUTPT VISIT, EST, LEVL III, 20-29 MIN: ICD-10-PCS | Mod: S$GLB,,, | Performed by: OBSTETRICS & GYNECOLOGY

## 2021-05-11 PROCEDURE — 1126F PR PAIN SEVERITY QUANTIFIED, NO PAIN PRESENT: ICD-10-PCS | Mod: S$GLB,,, | Performed by: OBSTETRICS & GYNECOLOGY

## 2021-05-11 PROCEDURE — 99213 OFFICE O/P EST LOW 20 MIN: CPT | Mod: S$GLB,,, | Performed by: OBSTETRICS & GYNECOLOGY

## 2021-05-11 RX ORDER — LEVOTHYROXINE SODIUM 50 UG/1
50 TABLET ORAL
COMMUNITY
Start: 2021-03-24 | End: 2022-04-07 | Stop reason: SDUPTHER

## 2021-05-11 RX ORDER — ESTRADIOL 0.1 MG/G
1 CREAM VAGINAL
Qty: 8 G | Refills: 2 | Status: SHIPPED | OUTPATIENT
Start: 2021-05-13 | End: 2021-09-29 | Stop reason: SDUPTHER

## 2021-07-12 ENCOUNTER — TELEPHONE (OUTPATIENT)
Dept: FAMILY MEDICINE | Facility: CLINIC | Age: 81
End: 2021-07-12

## 2021-07-12 DIAGNOSIS — I10 ESSENTIAL HYPERTENSION: ICD-10-CM

## 2021-09-28 ENCOUNTER — PATIENT OUTREACH (OUTPATIENT)
Dept: ADMINISTRATIVE | Facility: OTHER | Age: 81
End: 2021-09-28

## 2021-09-29 ENCOUNTER — OFFICE VISIT (OUTPATIENT)
Dept: UROLOGY | Facility: CLINIC | Age: 81
End: 2021-09-29
Payer: MEDICARE

## 2021-09-29 VITALS
SYSTOLIC BLOOD PRESSURE: 119 MMHG | HEART RATE: 94 BPM | WEIGHT: 134 LBS | BODY MASS INDEX: 25.3 KG/M2 | HEIGHT: 61 IN | OXYGEN SATURATION: 97 % | DIASTOLIC BLOOD PRESSURE: 66 MMHG

## 2021-09-29 DIAGNOSIS — N36.2 URETHRAL CARUNCLE: Primary | ICD-10-CM

## 2021-09-29 DIAGNOSIS — N95.2 ATROPHIC VAGINITIS: ICD-10-CM

## 2021-09-29 PROCEDURE — 3288F FALL RISK ASSESSMENT DOCD: CPT | Mod: HCNC,CPTII,S$GLB, | Performed by: NURSE PRACTITIONER

## 2021-09-29 PROCEDURE — 99214 OFFICE O/P EST MOD 30 MIN: CPT | Mod: HCNC,S$GLB,, | Performed by: NURSE PRACTITIONER

## 2021-09-29 PROCEDURE — 99999 PR PBB SHADOW E&M-EST. PATIENT-LVL IV: ICD-10-PCS | Mod: PBBFAC,HCNC,, | Performed by: NURSE PRACTITIONER

## 2021-09-29 PROCEDURE — 1160F RVW MEDS BY RX/DR IN RCRD: CPT | Mod: HCNC,CPTII,S$GLB, | Performed by: NURSE PRACTITIONER

## 2021-09-29 PROCEDURE — 1101F PR PT FALLS ASSESS DOC 0-1 FALLS W/OUT INJ PAST YR: ICD-10-PCS | Mod: HCNC,CPTII,S$GLB, | Performed by: NURSE PRACTITIONER

## 2021-09-29 PROCEDURE — 1101F PT FALLS ASSESS-DOCD LE1/YR: CPT | Mod: HCNC,CPTII,S$GLB, | Performed by: NURSE PRACTITIONER

## 2021-09-29 PROCEDURE — 1160F PR REVIEW ALL MEDS BY PRESCRIBER/CLIN PHARMACIST DOCUMENTED: ICD-10-PCS | Mod: HCNC,CPTII,S$GLB, | Performed by: NURSE PRACTITIONER

## 2021-09-29 PROCEDURE — 1159F PR MEDICATION LIST DOCUMENTED IN MEDICAL RECORD: ICD-10-PCS | Mod: HCNC,CPTII,S$GLB, | Performed by: NURSE PRACTITIONER

## 2021-09-29 PROCEDURE — 3074F SYST BP LT 130 MM HG: CPT | Mod: HCNC,CPTII,S$GLB, | Performed by: NURSE PRACTITIONER

## 2021-09-29 PROCEDURE — 99999 PR PBB SHADOW E&M-EST. PATIENT-LVL IV: CPT | Mod: PBBFAC,HCNC,, | Performed by: NURSE PRACTITIONER

## 2021-09-29 PROCEDURE — 1159F MED LIST DOCD IN RCRD: CPT | Mod: HCNC,CPTII,S$GLB, | Performed by: NURSE PRACTITIONER

## 2021-09-29 PROCEDURE — 1126F PR PAIN SEVERITY QUANTIFIED, NO PAIN PRESENT: ICD-10-PCS | Mod: HCNC,CPTII,S$GLB, | Performed by: NURSE PRACTITIONER

## 2021-09-29 PROCEDURE — 3074F PR MOST RECENT SYSTOLIC BLOOD PRESSURE < 130 MM HG: ICD-10-PCS | Mod: HCNC,CPTII,S$GLB, | Performed by: NURSE PRACTITIONER

## 2021-09-29 PROCEDURE — 1126F AMNT PAIN NOTED NONE PRSNT: CPT | Mod: HCNC,CPTII,S$GLB, | Performed by: NURSE PRACTITIONER

## 2021-09-29 PROCEDURE — 3078F PR MOST RECENT DIASTOLIC BLOOD PRESSURE < 80 MM HG: ICD-10-PCS | Mod: HCNC,CPTII,S$GLB, | Performed by: NURSE PRACTITIONER

## 2021-09-29 PROCEDURE — 99214 PR OFFICE/OUTPT VISIT, EST, LEVL IV, 30-39 MIN: ICD-10-PCS | Mod: HCNC,S$GLB,, | Performed by: NURSE PRACTITIONER

## 2021-09-29 PROCEDURE — 3078F DIAST BP <80 MM HG: CPT | Mod: HCNC,CPTII,S$GLB, | Performed by: NURSE PRACTITIONER

## 2021-09-29 PROCEDURE — 3288F PR FALLS RISK ASSESSMENT DOCUMENTED: ICD-10-PCS | Mod: HCNC,CPTII,S$GLB, | Performed by: NURSE PRACTITIONER

## 2021-09-29 RX ORDER — ESTRADIOL 0.1 MG/G
1 CREAM VAGINAL
Qty: 8 G | Refills: 11 | Status: SHIPPED | OUTPATIENT
Start: 2021-09-30 | End: 2022-10-17 | Stop reason: SDUPTHER

## 2021-09-29 RX ORDER — VITS A,C,E/LUTEIN/MINERALS 300MCG-200
1 TABLET ORAL
COMMUNITY

## 2021-10-07 ENCOUNTER — OFFICE VISIT (OUTPATIENT)
Dept: FAMILY MEDICINE | Facility: CLINIC | Age: 81
End: 2021-10-07
Payer: MEDICARE

## 2021-10-07 VITALS
BODY MASS INDEX: 25.11 KG/M2 | WEIGHT: 133 LBS | SYSTOLIC BLOOD PRESSURE: 130 MMHG | OXYGEN SATURATION: 98 % | DIASTOLIC BLOOD PRESSURE: 68 MMHG | HEART RATE: 63 BPM | HEIGHT: 61 IN

## 2021-10-07 DIAGNOSIS — M81.0 AGE-RELATED OSTEOPOROSIS WITHOUT CURRENT PATHOLOGICAL FRACTURE: ICD-10-CM

## 2021-10-07 DIAGNOSIS — E04.2 NONTOXIC MULTINODULAR GOITER: ICD-10-CM

## 2021-10-07 DIAGNOSIS — G62.9 NEUROPATHY: ICD-10-CM

## 2021-10-07 DIAGNOSIS — M21.372 LEFT FOOT DROP: ICD-10-CM

## 2021-10-07 DIAGNOSIS — I10 ESSENTIAL HYPERTENSION: Primary | ICD-10-CM

## 2021-10-07 DIAGNOSIS — E78.00 PURE HYPERCHOLESTEROLEMIA: ICD-10-CM

## 2021-10-07 DIAGNOSIS — Z78.9 STATIN INTOLERANCE: ICD-10-CM

## 2021-10-07 DIAGNOSIS — E03.9 HYPOTHYROIDISM, UNSPECIFIED TYPE: ICD-10-CM

## 2021-10-07 PROBLEM — R10.31 RIGHT LOWER QUADRANT ABDOMINAL PAIN: Status: RESOLVED | Noted: 2021-03-02 | Resolved: 2021-10-07

## 2021-10-07 PROCEDURE — 99999 PR PBB SHADOW E&M-EST. PATIENT-LVL IV: ICD-10-PCS | Mod: PBBFAC,HCNC,, | Performed by: FAMILY MEDICINE

## 2021-10-07 PROCEDURE — 3075F PR MOST RECENT SYSTOLIC BLOOD PRESS GE 130-139MM HG: ICD-10-PCS | Mod: HCNC,CPTII,S$GLB, | Performed by: FAMILY MEDICINE

## 2021-10-07 PROCEDURE — 3288F PR FALLS RISK ASSESSMENT DOCUMENTED: ICD-10-PCS | Mod: HCNC,CPTII,S$GLB, | Performed by: FAMILY MEDICINE

## 2021-10-07 PROCEDURE — 1126F AMNT PAIN NOTED NONE PRSNT: CPT | Mod: HCNC,CPTII,S$GLB, | Performed by: FAMILY MEDICINE

## 2021-10-07 PROCEDURE — 1101F PT FALLS ASSESS-DOCD LE1/YR: CPT | Mod: HCNC,CPTII,S$GLB, | Performed by: FAMILY MEDICINE

## 2021-10-07 PROCEDURE — 1126F PR PAIN SEVERITY QUANTIFIED, NO PAIN PRESENT: ICD-10-PCS | Mod: HCNC,CPTII,S$GLB, | Performed by: FAMILY MEDICINE

## 2021-10-07 PROCEDURE — 3078F PR MOST RECENT DIASTOLIC BLOOD PRESSURE < 80 MM HG: ICD-10-PCS | Mod: HCNC,CPTII,S$GLB, | Performed by: FAMILY MEDICINE

## 2021-10-07 PROCEDURE — 99999 PR PBB SHADOW E&M-EST. PATIENT-LVL IV: CPT | Mod: PBBFAC,HCNC,, | Performed by: FAMILY MEDICINE

## 2021-10-07 PROCEDURE — 1159F MED LIST DOCD IN RCRD: CPT | Mod: HCNC,CPTII,S$GLB, | Performed by: FAMILY MEDICINE

## 2021-10-07 PROCEDURE — 1101F PR PT FALLS ASSESS DOC 0-1 FALLS W/OUT INJ PAST YR: ICD-10-PCS | Mod: HCNC,CPTII,S$GLB, | Performed by: FAMILY MEDICINE

## 2021-10-07 PROCEDURE — 99214 PR OFFICE/OUTPT VISIT, EST, LEVL IV, 30-39 MIN: ICD-10-PCS | Mod: HCNC,S$GLB,, | Performed by: FAMILY MEDICINE

## 2021-10-07 PROCEDURE — 99499 RISK ADDL DX/OHS AUDIT: ICD-10-PCS | Mod: S$GLB,,, | Performed by: FAMILY MEDICINE

## 2021-10-07 PROCEDURE — 99214 OFFICE O/P EST MOD 30 MIN: CPT | Mod: HCNC,S$GLB,, | Performed by: FAMILY MEDICINE

## 2021-10-07 PROCEDURE — 1160F RVW MEDS BY RX/DR IN RCRD: CPT | Mod: HCNC,CPTII,S$GLB, | Performed by: FAMILY MEDICINE

## 2021-10-07 PROCEDURE — 1160F PR REVIEW ALL MEDS BY PRESCRIBER/CLIN PHARMACIST DOCUMENTED: ICD-10-PCS | Mod: HCNC,CPTII,S$GLB, | Performed by: FAMILY MEDICINE

## 2021-10-07 PROCEDURE — 3078F DIAST BP <80 MM HG: CPT | Mod: HCNC,CPTII,S$GLB, | Performed by: FAMILY MEDICINE

## 2021-10-07 PROCEDURE — 3075F SYST BP GE 130 - 139MM HG: CPT | Mod: HCNC,CPTII,S$GLB, | Performed by: FAMILY MEDICINE

## 2021-10-07 PROCEDURE — 3288F FALL RISK ASSESSMENT DOCD: CPT | Mod: HCNC,CPTII,S$GLB, | Performed by: FAMILY MEDICINE

## 2021-10-07 PROCEDURE — 1159F PR MEDICATION LIST DOCUMENTED IN MEDICAL RECORD: ICD-10-PCS | Mod: HCNC,CPTII,S$GLB, | Performed by: FAMILY MEDICINE

## 2021-10-07 PROCEDURE — 99499 UNLISTED E&M SERVICE: CPT | Mod: S$GLB,,, | Performed by: FAMILY MEDICINE

## 2021-10-07 RX ORDER — ZOSTER VACCINE RECOMBINANT, ADJUVANTED 50 MCG/0.5
KIT INTRAMUSCULAR
Qty: 1 EACH | Refills: 1 | Status: CANCELLED | OUTPATIENT
Start: 2021-10-07

## 2021-10-07 RX ORDER — ERGOCALCIFEROL 1.25 MG/1
50000 CAPSULE ORAL
COMMUNITY
End: 2022-09-26

## 2022-01-17 DIAGNOSIS — U07.1 COVID-19 VIRUS DETECTED: ICD-10-CM

## 2022-02-02 ENCOUNTER — OFFICE VISIT (OUTPATIENT)
Dept: FAMILY MEDICINE | Facility: CLINIC | Age: 82
End: 2022-02-02
Payer: MEDICARE

## 2022-02-02 VITALS
WEIGHT: 132.25 LBS | DIASTOLIC BLOOD PRESSURE: 68 MMHG | HEIGHT: 61 IN | BODY MASS INDEX: 24.97 KG/M2 | OXYGEN SATURATION: 96 % | SYSTOLIC BLOOD PRESSURE: 110 MMHG | HEART RATE: 77 BPM

## 2022-02-02 DIAGNOSIS — R53.83 FATIGUE, UNSPECIFIED TYPE: ICD-10-CM

## 2022-02-02 DIAGNOSIS — R63.2 POLYPHAGIA: ICD-10-CM

## 2022-02-02 DIAGNOSIS — R25.2 MUSCLE CRAMPS: Primary | ICD-10-CM

## 2022-02-02 DIAGNOSIS — E03.8 SUBCLINICAL HYPOTHYROIDISM: ICD-10-CM

## 2022-02-02 PROCEDURE — 3288F PR FALLS RISK ASSESSMENT DOCUMENTED: ICD-10-PCS | Mod: HCNC,CPTII,S$GLB, | Performed by: FAMILY MEDICINE

## 2022-02-02 PROCEDURE — 1159F PR MEDICATION LIST DOCUMENTED IN MEDICAL RECORD: ICD-10-PCS | Mod: HCNC,CPTII,S$GLB, | Performed by: FAMILY MEDICINE

## 2022-02-02 PROCEDURE — 1126F PR PAIN SEVERITY QUANTIFIED, NO PAIN PRESENT: ICD-10-PCS | Mod: HCNC,CPTII,S$GLB, | Performed by: FAMILY MEDICINE

## 2022-02-02 PROCEDURE — 3078F PR MOST RECENT DIASTOLIC BLOOD PRESSURE < 80 MM HG: ICD-10-PCS | Mod: HCNC,CPTII,S$GLB, | Performed by: FAMILY MEDICINE

## 2022-02-02 PROCEDURE — 3288F FALL RISK ASSESSMENT DOCD: CPT | Mod: HCNC,CPTII,S$GLB, | Performed by: FAMILY MEDICINE

## 2022-02-02 PROCEDURE — 1101F PR PT FALLS ASSESS DOC 0-1 FALLS W/OUT INJ PAST YR: ICD-10-PCS | Mod: HCNC,CPTII,S$GLB, | Performed by: FAMILY MEDICINE

## 2022-02-02 PROCEDURE — 1160F PR REVIEW ALL MEDS BY PRESCRIBER/CLIN PHARMACIST DOCUMENTED: ICD-10-PCS | Mod: HCNC,CPTII,S$GLB, | Performed by: FAMILY MEDICINE

## 2022-02-02 PROCEDURE — 99214 OFFICE O/P EST MOD 30 MIN: CPT | Mod: HCNC,S$GLB,, | Performed by: FAMILY MEDICINE

## 2022-02-02 PROCEDURE — 3078F DIAST BP <80 MM HG: CPT | Mod: HCNC,CPTII,S$GLB, | Performed by: FAMILY MEDICINE

## 2022-02-02 PROCEDURE — 3074F PR MOST RECENT SYSTOLIC BLOOD PRESSURE < 130 MM HG: ICD-10-PCS | Mod: HCNC,CPTII,S$GLB, | Performed by: FAMILY MEDICINE

## 2022-02-02 PROCEDURE — 1101F PT FALLS ASSESS-DOCD LE1/YR: CPT | Mod: HCNC,CPTII,S$GLB, | Performed by: FAMILY MEDICINE

## 2022-02-02 PROCEDURE — 99999 PR PBB SHADOW E&M-EST. PATIENT-LVL III: CPT | Mod: PBBFAC,HCNC,, | Performed by: FAMILY MEDICINE

## 2022-02-02 PROCEDURE — 1159F MED LIST DOCD IN RCRD: CPT | Mod: HCNC,CPTII,S$GLB, | Performed by: FAMILY MEDICINE

## 2022-02-02 PROCEDURE — 3074F SYST BP LT 130 MM HG: CPT | Mod: HCNC,CPTII,S$GLB, | Performed by: FAMILY MEDICINE

## 2022-02-02 PROCEDURE — 1126F AMNT PAIN NOTED NONE PRSNT: CPT | Mod: HCNC,CPTII,S$GLB, | Performed by: FAMILY MEDICINE

## 2022-02-02 PROCEDURE — 99999 PR PBB SHADOW E&M-EST. PATIENT-LVL III: ICD-10-PCS | Mod: PBBFAC,HCNC,, | Performed by: FAMILY MEDICINE

## 2022-02-02 PROCEDURE — 1160F RVW MEDS BY RX/DR IN RCRD: CPT | Mod: HCNC,CPTII,S$GLB, | Performed by: FAMILY MEDICINE

## 2022-02-02 PROCEDURE — 99214 PR OFFICE/OUTPT VISIT, EST, LEVL IV, 30-39 MIN: ICD-10-PCS | Mod: HCNC,S$GLB,, | Performed by: FAMILY MEDICINE

## 2022-02-02 RX ORDER — LEVOTHYROXINE SODIUM 50 UG/1
1 TABLET ORAL DAILY
COMMUNITY
Start: 2022-01-31 | End: 2022-02-02 | Stop reason: SDUPTHER

## 2022-02-02 NOTE — PROGRESS NOTES
EST PATIENT VISIT FAMILY MEDICINE    CC:   Chief Complaint   Patient presents with    Fatigue     Hand cramping/abdomen cramping on Lt side/hot flashes/always hungary       HPI: Joanne Daniel  is a 81 y.o. female:    Patient is new to me. She presents for an acute visit. She reports she had covid recently and has recovered from this however the past few days she has felt tired, noted tremor, weakness. She denies any other associated symptoms. She is concerned it may be from her thyroid medication. She also notes intermittent muscle cramps in her hands. She has not had this before.     ROS: Review of Systems   Constitutional: Positive for malaise/fatigue. Negative for diaphoresis, fever and weight loss.   Respiratory: Negative for shortness of breath.    Cardiovascular: Negative for chest pain.   Musculoskeletal: Negative for falls.   Neurological: Negative for dizziness, focal weakness and loss of consciousness.   Psychiatric/Behavioral: The patient is nervous/anxious.        PMHX:   Past Medical History:   Diagnosis Date    Anemia of chronic disease 10/22/2018    Colon polyps     Foot drop, left foot     Glaucoma     Hyperlipidemia     Thrombocytosis 10/2/2018    Urinary tract infection     Vaginal infection        PSHX:   Past Surgical History:   Procedure Laterality Date    COLONOSCOPY  08/09/2019    Mild diverticulosis of the desending colon and sigmoid colon. Polyp 12 mm in the hepatic flexure    EYE SURGERY Right     POLYPECTOMY         FAMHX:   Family History   Problem Relation Age of Onset    Heart disease Neg Hx     Cancer Neg Hx     Kidney disease Neg Hx     Breast cancer Neg Hx     Ovarian cancer Neg Hx     Colon cancer Neg Hx        SOCHX:   Social History     Socioeconomic History    Marital status:      Spouse name: Antonino    Number of children: 3   Tobacco Use    Smoking status: Never Smoker    Smokeless tobacco: Never Used   Substance and Sexual Activity    Alcohol  "use: No     Alcohol/week: 0.0 standard drinks    Drug use: No    Sexual activity: Yes     Partners: Male   Social History Narrative    Lives with her  in [place]. 3 children and 5 grandchildren. Faith. [employer]       ALLERGIES:   Review of patient's allergies indicates:   Allergen Reactions    Fosamax [alendronate] Other (See Comments)     Patient unsure but reports that she was told not to take medication       MEDS:   Current Outpatient Medications:     calcium-mag oxide-vitamin D3 185- mg-mg-unit Cap, Take by mouth 3 (three) times daily., Disp: , Rfl:     ergocalciferol (ERGOCALCIFEROL) 50,000 unit Cap, Take 50,000 Units by mouth every 7 days., Disp: , Rfl:     estradioL (ESTRACE) 0.01 % (0.1 mg/gram) vaginal cream, Place 1 g vaginally twice a week., Disp: 8 g, Rfl: 11    magnesium oxide 200 mg magnesium Tab, Take 1 tablet by mouth., Disp: , Rfl:     ondansetron (ZOFRAN-ODT) 4 MG TbDL, Take 1 tablet (4 mg total) by mouth every 6 (six) hours as needed (nausea)., Disp: 15 tablet, Rfl: 0    thiamine 100 MG tablet, Take 100 mg by mouth once daily., Disp: , Rfl:     levothyroxine (SYNTHROID) 50 MCG tablet, Take 50 mcg by mouth., Disp: , Rfl:     OBJECTIVE:   Vitals:    02/02/22 1128   BP: 110/68   BP Location: Left arm   Patient Position: Sitting   BP Method: Medium (Manual)   Pulse: 77   SpO2: 96%   Weight: 60 kg (132 lb 4.4 oz)   Height: 5' 1" (1.549 m)     Body mass index is 24.99 kg/m².      Physical Exam  Vitals and nursing note reviewed.   Constitutional:       Appearance: Normal appearance.   HENT:      Head: Normocephalic.   Eyes:      General:         Right eye: No discharge.         Left eye: No discharge.      Extraocular Movements: Extraocular movements intact.   Cardiovascular:      Rate and Rhythm: Normal rate and regular rhythm.   Pulmonary:      Effort: Pulmonary effort is normal.      Breath sounds: Normal breath sounds.   Musculoskeletal:      Right lower leg: No " edema.      Left lower leg: No edema.   Skin:     Comments: No obvious rash on exposed skin   Neurological:      Mental Status: She is alert.   Psychiatric:         Behavior: Behavior normal.           LABS:   A1C:  Recent Labs   Lab 02/02/22  1214   Hemoglobin A1C 5.8 H     CBC:  Recent Labs   Lab 02/02/22  1214   WBC 8.83   RBC 4.08   Hemoglobin 11.9 L   Hematocrit 36.6 L   Platelets 387   MCV 90   MCH 29.2   MCHC 32.5     CMP:  Recent Labs   Lab 02/02/22  1214   Glucose 104   Calcium 9.7   Albumin 4.1   Total Protein 7.5   Sodium 143   Potassium 4.8   CO2 26   Chloride 102   BUN 17   Creatinine 0.77   Alkaline Phosphatase 91   ALT 21   AST 25   Total Bilirubin 0.3     LIPIDS:  Recent Labs   Lab 07/06/21  0625 08/20/21  0755 02/02/22  1214   TSH 3.310   < > 1.600   HDL 78 H  --   --    Cholesterol 242 H  --   --    Triglycerides 82  --   --    LDL Cholesterol 147.6  --   --    HDL/Cholesterol Ratio 32.2  --   --    Non-HDL Cholesterol 164  --   --    Total Cholesterol/HDL Ratio 3.1  --   --     < > = values in this interval not displayed.     TSH:  Recent Labs   Lab 02/02/22  1214   TSH 1.600         ASSESSMENT & PLAN:    Problem List Items Addressed This Visit    None     Visit Diagnoses     Muscle cramps    -  Primary    Relevant Orders    CBC Auto Differential (Completed)    Comprehensive Metabolic Panel (Completed)    Magnesium (Completed)    Subclinical hypothyroidism        Relevant Orders    TSH (Completed)    T4, Free (Completed)    Polyphagia        Relevant Orders    Hemoglobin A1C (Completed)    Fatigue, unspecified type        Relevant Orders    Urinalysis, Reflex to Urine Culture Urine, Clean Catch (Completed)        Will check labs for multiple nonspecific symptoms. Advised to follow up with PCP and specialists as scheduled.       Follow up if symptoms worsen or fail to improve.      RTC/ED precautions discussed where applicable.   Encouraged patient to let me know if there are any further  questions/concerns.     Advise patient/caretaker to check with insurance regarding orders to avoid unexpected fees/costs.     The patient/caretaker indicates understanding of these issues and agrees with the plan.    Dr. Fred Colby MD  Family Medicine

## 2022-02-07 ENCOUNTER — TELEPHONE (OUTPATIENT)
Dept: HEMATOLOGY/ONCOLOGY | Facility: CLINIC | Age: 82
End: 2022-02-07
Payer: MEDICARE

## 2022-02-07 NOTE — TELEPHONE ENCOUNTER
----- Message from Abena Fontenot sent at 2/7/2022  8:45 AM CST -----  Type:  Test Results    Who Called: Joanne     Name of Test (Lab/Mammo/Etc): Lab     Date of Test: 02/02    Ordering Provider: Dr. Fred Colby     Where the test was performed:  [facility]     Best Call Back Number: [phone]     Pt is requesting a call back as soon as possible

## 2022-02-07 NOTE — TELEPHONE ENCOUNTER
----- Message from Reema Ingram sent at 2/7/2022  1:42 PM CST -----  Contact: [phone]/ Self  Type: Requesting to speak with nurse    Who Called: Pt   Regarding:  has medication concerns  and lab results   Would the patient rather a call back or a response via [facility]? Call back  Best Call Back Number: [phone]  Additional Information: n/a

## 2022-02-07 NOTE — TELEPHONE ENCOUNTER
Please call Joanne Daniel. Dr. Colby is out until Wednesday and she can address when she returns.   Dr. Radha Cherry D.O.   [facility]

## 2022-02-08 ENCOUNTER — TELEPHONE (OUTPATIENT)
Dept: FAMILY MEDICINE | Facility: CLINIC | Age: 82
End: 2022-02-08
Payer: MEDICARE

## 2022-02-08 NOTE — TELEPHONE ENCOUNTER
Pt stop taking thyroid med says she is starting  Experiencing side effects and is asking is there a natural alternate. Says naturall thyroid?

## 2022-02-09 ENCOUNTER — TELEPHONE (OUTPATIENT)
Dept: FAMILY MEDICINE | Facility: CLINIC | Age: 82
End: 2022-02-09
Payer: MEDICARE

## 2022-02-09 NOTE — TELEPHONE ENCOUNTER
I called and spoke with patient.  She stopped her thyroid medicine levothyroxine 50 mcg daily last week.  She reports she is feeling much better off the medication.  She reports that she read the side effects of the medication and found that all the symptoms were related to the symptoms that she was having.  She also read that the medication should be used with caution in people greater than 65 years old.  Discussed with her that she needs to take the medication related with her thyroid disease and that we have been monitoring her thyroid levels and her TSH is at goal.  Her endocrinologist Dr. Cook manages her thyroid.  She says she does not want to restart her thyroid medication but she does have a repeat labs in appointment with endocrinology in 1 month.  Discussed risk of not taking her thyroid medication.  Questions elicited and answered.  She has good follow-up with Endocrinology.  And she will see me after she sees Endocrinology.    Dr. Radha Cherry D.O.   Family Medicine

## 2022-02-18 ENCOUNTER — PATIENT MESSAGE (OUTPATIENT)
Dept: UROLOGY | Facility: CLINIC | Age: 82
End: 2022-02-18
Payer: MEDICARE

## 2022-04-06 RX ORDER — PRAVASTATIN SODIUM 10 MG/1
10 TABLET ORAL DAILY
Qty: 90 TABLET | Refills: 3 | Status: CANCELLED | OUTPATIENT
Start: 2022-04-06 | End: 2023-04-06

## 2022-04-06 NOTE — PROGRESS NOTES
FAMILY MEDICINE  [facility]  [facility]    CC:   Chief Complaint   Patient presents with    Follow-up       HPI: Joanne Daniel is a 81 y.o. female  - with hyperlipidemia, atrophic vaginitis (on topical hormone replacement therapy), hypothyroidism, hypertension multinodular goiter, hypertension, osteoarthritis, urethral carbuncle and left foot drop with bilateral LE neuropathy (reports was evaluated by Neurology when initially presented and diagnosed with hereditary neuropathy 2015) presents for follow-up blood pressure and labs     Urology: MARIYA Berumen NP   Endocrinology: Dr. Lesly Cook  - reports Dr. Cook follows labs every 6 months    Today she reports that she is doing well and presents with her  Antonino.  Course that her thyroid labs have been stable.  She would like to transfer thyroid care to primary care.  She recently has labs done with Endocrinology.    1. Hypertension  - none  - BP goal < 140/90    Current medication treatment: none    Prior medication:   amLODIPine (NORVASC) 5 MG tablet, Take 1 tablet (5 mg total) by mouth once daily., Disp: 90 tablet, Rfl: 1    Medication side effects: no medication side effects noted  taking medications as instructed, no medication side effects noted, no TIAs, no chest pain on exertion, no dyspnea on exertion, no swelling of ankles    Exercise regimen: not active    Home BP cuff: Yes  How often does patient monitoring BP? daily     2. Hyperlipidemia  - hypertension  - LDL goal < 140    Current treatment:  None refuses try other medications    Prior medication:   Rosuvastatin 5 mg - myalgias    Side effects from current treatment: NA    Lab Results       Component                Value               Date                       CHOL                     240 (H)             03/04/2022                 CHOL                     242 (H)             07/06/2021                 CHOL                     220 (H)             12/30/2020             Lab Results       Component                Value               Date                       HDL                      74                  03/04/2022                 HDL                      78 (H)              07/06/2021                 HDL                      71                  12/30/2020            Lab Results       Component                Value               Date                       LDLCALC                  150.2               03/04/2022                 LDLCALC                  147.6               07/06/2021                 LDLCALC                  132.6               12/30/2020            Lab Results       Component                Value               Date                       TRIG                     79                  03/04/2022                 TRIG                     82                  07/06/2021                 TRIG                     82                  12/30/2020            Lab Results       Component                Value               Date                       CHOLHDL                  30.8                03/04/2022                 CHOLHDL                  32.2                07/06/2021                 CHOLHDL                  32.3                12/30/2020           3. Hypothyroidism     Age diagnosed: remove    Symptomatic at diagnosis: does not recall  Prior evaluation by Endocrinologist: Yes, describe: Dr. Noland  Prior thyroid US: yes - thyroid nodules stable to improved from 2018 6/17/2020  Routine  Narrative & Impression  EXAMINATION:  US THYROID  CLINICAL HISTORY:  Nontoxic multinodular goiter  TECHNIQUE:  Ultrasound of the thyroid and cervical lymph nodes was performed.  FINDINGS:  The right and left lobes of the thyroid measure 2.4 x 1.1 x 1.3 cm and 2.3 x 0.6 x 0.6 cm respectively.  There is a 0.3 cm hypoechoic nodule within the right lobe.  The left lobe has a homogeneous echotexture.  There is a nonenlarged cervical lymph node on the left.  Impression:  Subcentimeter right-sided  thyroid nodule which does not meet the size criteria for fine needle aspiration.  Electronically signed by: Anthony Bowles MD  Date:                                            06/17/2020  Time:                                           10:35    Current medication:   levothyroxine (SYNTHROID) 50 MCG tablet, Take 50 mcg by mouth., Disp: , Rfl:   - brand only    Side effects from medication: none  Scheduled for medication: AM fasting separate from other medications    Symptoms from thyroid issue: denies fatigue, weight changes, heat/cold intolerance, bowel/skin changes or CVS symptoms  Concerns: denies    Lab Results       Component                Value               Date                       TSH                      2.590               03/04/2022                 [id number]                  8.5                 12/30/2020                 FREET4                   0.98                03/04/2022                                      Review of Systems   All other systems reviewed and are negative.      HEALTH MAINTENANCE:   Health Maintenance   Topic Date Due    Colonoscopy  08/09/2022    DEXA Scan  03/04/2024    Lipid Panel  03/04/2027    TETANUS VACCINE  03/01/2028     Health Maintenance Topics with due status: Not Due       Topic Last Completion Date    TETANUS VACCINE 03/01/2018    Colonoscopy 08/09/2019    DEXA Scan 03/04/2021    Lipid Panel 03/04/2022     Health Maintenance Due   Topic Date Due    COVID-19 Vaccine (1) Never done    Shingles Vaccine (2 of 3) 04/21/2015    Influenza Vaccine (1) Never done       Past Medical History:   Diagnosis Date    Anemia of chronic disease 10/22/2018    Colon polyps     Foot drop, left foot     Glaucoma     Hyperlipidemia     Thrombocytosis 10/2/2018    Urinary tract infection     Vaginal infection        Past Surgical History:   Procedure Laterality Date    COLONOSCOPY  08/09/2019    Mild diverticulosis of the desending colon and sigmoid colon. Polyp 12 mm in the hepatic  "flexure    EYE SURGERY Right     POLYPECTOMY         Family History   Problem Relation Age of Onset    Heart disease Neg Hx     Cancer Neg Hx     Kidney disease Neg Hx     Breast cancer Neg Hx     Ovarian cancer Neg Hx     Colon cancer Neg Hx        Social History     Tobacco Use    Smoking status: Never Smoker    Smokeless tobacco: Never Used   Substance Use Topics    Alcohol use: No     Alcohol/week: 0.0 standard drinks    Drug use: No       Social History     Social History Narrative    Lives with her  in [place]. 3 children and 5 grandchildren. Denominational. Born-again Rastafari       ALLERGIES:   Review of patient's allergies indicates:   Allergen Reactions    Fosamax [alendronate] Other (See Comments)     Patient unsure but reports that she was told not to take medication       MEDS:     Current Outpatient Medications:     calcium-mag oxide-vitamin D3 185- mg-mg-unit Cap, Take by mouth 3 (three) times daily., Disp: , Rfl:     ergocalciferol (ERGOCALCIFEROL) 50,000 unit Cap, Take 50,000 Units by mouth every 7 days., Disp: , Rfl:     magnesium oxide 200 mg magnesium Tab, Take 1 tablet by mouth., Disp: , Rfl:     estradioL (ESTRACE) 0.01 % (0.1 mg/gram) vaginal cream, Place 1 g vaginally twice a week., Disp: 8 g, Rfl: 11    ondansetron (ZOFRAN-ODT) 4 MG TbDL, Take 1 tablet (4 mg total) by mouth every 6 (six) hours as needed (nausea). (Patient not taking: Reported on 4/7/2022), Disp: 15 tablet, Rfl: 0    SYNTHROID 50 mcg tablet, Take 1 tablet (50 mcg total) by mouth before breakfast., Disp: 90 tablet, Rfl: 3    thiamine 100 MG tablet, Take 100 mg by mouth once daily., Disp: , Rfl:       OBJECTIVE:   Vitals:    04/07/22 0825   BP: 132/62   Pulse: 66   SpO2: 98%   Weight: 60.7 kg (133 lb 12.8 oz)   Height: 5' 1" (1.549 m)     Body mass index is 25.28 kg/m².    Physical Exam  Constitutional:       General: She is not in acute distress.  HENT:      Right Ear: There is impacted cerumen.      " "Left Ear: Tympanic membrane and ear canal normal.   Neck:      Thyroid: No thyromegaly.      Vascular: No carotid bruit.   Cardiovascular:      Rate and Rhythm: Normal rate and regular rhythm.      Pulses: Normal pulses.      Heart sounds: Normal heart sounds. No murmur heard.    No friction rub. No gallop.   Pulmonary:      Effort: Pulmonary effort is normal.      Breath sounds: Normal breath sounds. No wheezing, rhonchi or rales.   Abdominal:      General: Bowel sounds are normal. There is no distension.      Palpations: Abdomen is soft.      Tenderness: There is no abdominal tenderness. There is no right CVA tenderness, left CVA tenderness, guarding or rebound.   Musculoskeletal:      Cervical back: Neck supple.      Right lower leg: No edema.      Left lower leg: No edema.   Lymphadenopathy:      Cervical: No cervical adenopathy.   Neurological:      Mental Status: She is alert.           PHQ4 = No data recorded    MMSE 3/2/2021   What is the (year), (season), (date), (day), (month)? 5   Where are we (state), (country), (town or city), (hospital), (floor)? 5   Name 3 common objects (eg. "apple", "table", "kathleen"). Take 1 second to say each. Then ask the patient to repeat all 3. Give 1 point for each correct answer. Then repeat them until he/she learns all 3. Count trials and record. 3   Serial 7's backwards. Stop after 5 answers. (100,93,86,79,72) or alternatively  spell "WORLD" backwards. (D..L..R..O..W). The score is the number of letters in correct order. 5   Ask for the 3 common objects named earlier in the exam. Give 1 point for each correct answer. 2   Name a "pencil" and "watch." 2   Repeat the following: "No ifs, ands, or buts." 1   Follow a 3-stage command: "Take a paper in your right hand, fold it in half, & put it on the floor." 3   Read and obey the following: (see paper exam) 1   Write a sentence. 1   Copy the following design: (see paper exam) 1   Total MMSE Score 29   Some recent data might be " hidden       PERTINENT RESULTS:   Lab Visit on 03/04/2022   Component Date Value Ref Range Status    Sodium 03/04/2022 143  136 - 145 mmol/L Final    Potassium 03/04/2022 4.7  3.5 - 5.1 mmol/L Final    Chloride 03/04/2022 103  95 - 110 mmol/L Final    CO2 03/04/2022 29  23 - 29 mmol/L Final    Glucose 03/04/2022 97  70 - 110 mg/dL Final    BUN 03/04/2022 13  7 - 17 mg/dL Final    Creatinine 03/04/2022 0.64  0.50 - 1.40 mg/dL Final    Calcium 03/04/2022 9.1  8.7 - 10.5 mg/dL Final    Total Protein 03/04/2022 7.6  6.0 - 8.4 g/dL Final    Albumin 03/04/2022 4.0  3.5 - 5.2 g/dL Final    Total Bilirubin 03/04/2022 0.5  0.1 - 1.0 mg/dL Final    Comment: For infants and newborns, interpretation of results should be based  on gestational age, weight and in agreement with clinical  observations.    Premature Infant recommended reference ranges:  Up to 24 hours.............<8.0 mg/dL  Up to 48 hours............<12.0 mg/dL  3-5 days..................<15.0 mg/dL  6-29 days.................<15.0 mg/dL      Alkaline Phosphatase 03/04/2022 98  38 - 126 U/L Final    AST 03/04/2022 26  15 - 46 U/L Final    ALT 03/04/2022 19  10 - 44 U/L Final    Anion Gap 03/04/2022 11  8 - 16 mmol/L Final    eGFR if African American 03/04/2022 >60.0  >60 mL/min/1.73 m^2 Final    eGFR if non African American 03/04/2022 >60.0  >60 mL/min/1.73 m^2 Final    Comment: Calculation used to obtain the estimated glomerular filtration  rate (eGFR) is the CKD-EPI equation.       Cholesterol 03/04/2022 240 (A) 120 - 199 mg/dL Final    Comment: The National Cholesterol Education Program (NCEP) has set the  following guidelines (reference ranges) for Cholesterol:  Optimal.....................<200 mg/dL  Borderline High.............200-239 mg/dL  High........................> or = 240 mg/dL      Triglycerides 03/04/2022 79  30 - 150 mg/dL Final    Comment: The National Cholesterol Education Program (NCEP) has set the  following guidelines  (reference values) for triglycerides:  Normal......................<150 mg/dL  Borderline High.............150-199 mg/dL  High........................200-499 mg/dL      HDL 03/04/2022 74  40 - 75 mg/dL Final    Comment: The National Cholesterol Education Program (NCEP) has set the  following guidelines (reference values) for HDL Cholesterol:  Low...............<40 mg/dL  Optimal...........>60 mg/dL      LDL Cholesterol 03/04/2022 150.2  63.0 - 159.0 mg/dL Final    Comment: The National Cholesterol Education Program (NCEP) has set the  following guidelines (reference values) for LDL Cholesterol:  Optimal.......................<130 mg/dL  Borderline High...............130-159 mg/dL  High..........................160-189 mg/dL  Very High.....................>190 mg/dL      HDL/Cholesterol Ratio 03/04/2022 30.8  20.0 - 50.0 % Final    Total Cholesterol/HDL Ratio 03/04/2022 3.2  2.0 - 5.0 Final    Non-HDL Cholesterol 03/04/2022 166  mg/dL Final    Comment: Risk category and Non-HDL cholesterol goals:  Coronary heart disease (CHD)or equivalent (10-year risk of CHD >20%):  Non-HDL cholesterol goal     <130 mg/dL  Two or more CHD risk factors and 10-year risk of CHD <= 20%:  Non-HDL cholesterol goal     <160 mg/dL  0 to 1 CHD risk factor:  Non-HDL cholesterol goal     <190 mg/dL      WBC 03/04/2022 7.35  3.90 - 12.70 K/uL Final    RBC 03/04/2022 4.39  4.00 - 5.40 M/uL Final    Hemoglobin 03/04/2022 12.8  12.0 - 16.0 g/dL Final    Hematocrit 03/04/2022 40.7  37.0 - 48.5 % Final    MCV 03/04/2022 93  82 - 98 fL Final    MCH 03/04/2022 29.2  27.0 - 31.0 pg Final    MCHC 03/04/2022 31.4 (A) 32.0 - 36.0 g/dL Final    RDW 03/04/2022 14.4  11.5 - 14.5 % Final    Platelets 03/04/2022 205  150 - 450 K/uL Final    MPV 03/04/2022 11.6  9.2 - 12.9 fL Final    Immature Granulocytes 03/04/2022 0.3  0.0 - 0.5 % Final    Gran # (ANC) 03/04/2022 3.4  1.8 - 7.7 K/uL Final    Immature Grans (Abs) 03/04/2022 0.02  0.00 - 0.04  K/uL Final    Comment: Mild elevation in immature granulocytes is non specific and   can be seen in a variety of conditions including stress response,   acute inflammation, trauma and pregnancy. Correlation with other   laboratory and clinical findings is essential.      Lymph # 03/04/2022 2.5  1.0 - 4.8 K/uL Final    Mono # 03/04/2022 0.6  0.3 - 1.0 K/uL Final    Eos # 03/04/2022 0.8 (A) 0.0 - 0.5 K/uL Final    Baso # 03/04/2022 0.08  0.00 - 0.20 K/uL Final    nRBC 03/04/2022 0  0 /100 WBC Final    Gran % 03/04/2022 46.1  38.0 - 73.0 % Final    Lymph % 03/04/2022 33.5  18.0 - 48.0 % Final    Mono % 03/04/2022 8.0  4.0 - 15.0 % Final    Eosinophil % 03/04/2022 11.0 (A) 0.0 - 8.0 % Final    Basophil % 03/04/2022 1.1  0.0 - 1.9 % Final    Platelet Estimate 03/04/2022 Appears normal   Final    Differential Method 03/04/2022 Automated   Final    Free T4 03/04/2022 0.98  0.71 - 1.51 ng/dL Final    TSH 03/04/2022 2.590  0.400 - 4.000 uIU/mL Final    Comment: Warning:  Heterophilic antibodies in serum or plasma of   certain individuals are known to cause interference with   immunoassays. These antibodies may be present in blood samples   from individuals regularly exposed to animal or who have been   treated with animal products.     Patients taking high doses of supplemental biotin may have  negatively biased results.      Lab Visit on 02/02/2022   Component Date Value Ref Range Status    Specimen UA 02/02/2022 Urine, Clean Catch   Final    Color, UA 02/02/2022 Yellow  Yellow, Straw, Twila Final    Appearance, UA 02/02/2022 Clear  Clear Final    pH, UA 02/02/2022 6.0  5.0 - 8.0 Final    Specific Gravity, UA 02/02/2022 1.015  1.005 - 1.030 Final    Protein, UA 02/02/2022 Negative  Negative Final    Comment: Recommend a 24 hour urine protein or a urine   protein/creatinine ratio if globulin induced proteinuria is  clinically suspected.      Glucose, UA 02/02/2022 Negative  Negative Final    Ketones, UA  02/02/2022 Negative  Negative Final    Bilirubin (UA) 02/02/2022 Negative  Negative Final    Occult Blood UA 02/02/2022 Negative  Negative Final    Nitrite, UA 02/02/2022 Negative  Negative Final    Urobilinogen, UA 02/02/2022 Negative  <2.0 EU/dL Final    Leukocytes, UA 02/02/2022 Negative  Negative Final   Lab Visit on 02/02/2022   Component Date Value Ref Range Status    WBC 02/02/2022 8.83  3.90 - 12.70 K/uL Final    RBC 02/02/2022 4.08  4.00 - 5.40 M/uL Final    Hemoglobin 02/02/2022 11.9 (A) 12.0 - 16.0 g/dL Final    Hematocrit 02/02/2022 36.6 (A) 37.0 - 48.5 % Final    MCV 02/02/2022 90  82 - 98 fL Final    MCH 02/02/2022 29.2  27.0 - 31.0 pg Final    MCHC 02/02/2022 32.5  32.0 - 36.0 g/dL Final    RDW 02/02/2022 13.8  11.5 - 14.5 % Final    Platelets 02/02/2022 387  150 - 450 K/uL Final    MPV 02/02/2022 10.6  9.2 - 12.9 fL Final    Immature Granulocytes 02/02/2022 0.2  0.0 - 0.5 % Final    Gran # (ANC) 02/02/2022 4.9  1.8 - 7.7 K/uL Final    Immature Grans (Abs) 02/02/2022 0.02  0.00 - 0.04 K/uL Final    Comment: Mild elevation in immature granulocytes is non specific and   can be seen in a variety of conditions including stress response,   acute inflammation, trauma and pregnancy. Correlation with other   laboratory and clinical findings is essential.      Lymph # 02/02/2022 2.9  1.0 - 4.8 K/uL Final    Mono # 02/02/2022 0.6  0.3 - 1.0 K/uL Final    Eos # 02/02/2022 0.4  0.0 - 0.5 K/uL Final    Baso # 02/02/2022 0.06  0.00 - 0.20 K/uL Final    nRBC 02/02/2022 0  0 /100 WBC Final    Gran % 02/02/2022 55.1  38.0 - 73.0 % Final    Lymph % 02/02/2022 33.2  18.0 - 48.0 % Final    Mono % 02/02/2022 6.8  4.0 - 15.0 % Final    Eosinophil % 02/02/2022 4.0  0.0 - 8.0 % Final    Basophil % 02/02/2022 0.7  0.0 - 1.9 % Final    Differential Method 02/02/2022 Automated   Final    Sodium 02/02/2022 143  136 - 145 mmol/L Final    Potassium 02/02/2022 4.8  3.5 - 5.1 mmol/L Final    Chloride  02/02/2022 102  95 - 110 mmol/L Final    CO2 02/02/2022 26  23 - 29 mmol/L Final    Glucose 02/02/2022 104  70 - 110 mg/dL Final    BUN 02/02/2022 17  7 - 17 mg/dL Final    Creatinine 02/02/2022 0.77  0.50 - 1.40 mg/dL Final    Calcium 02/02/2022 9.7  8.7 - 10.5 mg/dL Final    Total Protein 02/02/2022 7.5  6.0 - 8.4 g/dL Final    Albumin 02/02/2022 4.1  3.5 - 5.2 g/dL Final    Total Bilirubin 02/02/2022 0.3  0.1 - 1.0 mg/dL Final    Comment: For infants and newborns, interpretation of results should be based  on gestational age, weight and in agreement with clinical  observations.    Premature Infant recommended reference ranges:  Up to 24 hours.............<8.0 mg/dL  Up to 48 hours............<12.0 mg/dL  3-5 days..................<15.0 mg/dL  6-29 days.................<15.0 mg/dL      Alkaline Phosphatase 02/02/2022 91  38 - 126 U/L Final    AST 02/02/2022 25  15 - 46 U/L Final    ALT 02/02/2022 21  10 - 44 U/L Final    Anion Gap 02/02/2022 15  8 - 16 mmol/L Final    eGFR if African American 02/02/2022 >60.0  >60 mL/min/1.73 m^2 Final    eGFR if non African American 02/02/2022 >60.0  >60 mL/min/1.73 m^2 Final    Comment: Calculation used to obtain the estimated glomerular filtration  rate (eGFR) is the CKD-EPI equation.       TSH 02/02/2022 1.600  0.400 - 4.000 uIU/mL Final    Comment: Warning:  Heterophilic antibodies in serum or plasma of   certain individuals are known to cause interference with   immunoassays. These antibodies may be present in blood samples   from individuals regularly exposed to animal or who have been   treated with animal products.     Patients taking high doses of supplemental biotin may have  negatively biased results.       Free T4 02/02/2022 1.03  0.71 - 1.51 ng/dL Final    Hemoglobin A1C 02/02/2022 5.8 (A) 4.0 - 5.6 % Final    Comment: ADA Screening Guidelines:  5.7-6.4%  Consistent with prediabetes  >or=6.5%  Consistent with diabetes    High levels of fetal  hemoglobin interfere with the HbA1C  assay. Heterozygous hemoglobin variants (HbS, HgC, etc)do  not significantly interfere with this assay.   However, presence of multiple variants may affect accuracy.      Estimated Avg Glucose 02/02/2022 120  68 - 131 mg/dL Final    Magnesium 02/02/2022 2.0  1.6 - 2.6 mg/dL Final   Admission on 01/17/2022, Discharged on 01/17/2022   Component Date Value Ref Range Status    SARS-CoV-2 RNA, Amplification, Qual 01/17/2022 Positive (A) Negative Final    Comment: This test utilizes isothermal nucleic acid amplification   technology to detect the SARS-CoV-2 RdRp nucleic acid segment.   The analytical sensitivity (limit of detection) is 125 genome   equivalents/mL.     A POSITIVE result implies infection with the SARS-CoV-2 virus;  the patient is presumed to be contagious.    A NEGATIVE result means that SARS-CoV-2 nucleic acids are not  present above the limit of detection. A NEGATIVE result should be   treated as presumptive. It does not rule out the possibility of   COVID-19 and should not be the sole basis for treatment decisions.   If COVID-19 is strongly suspected based on clinical and exposure   history, re-testing using an alternate molecular assay should be   considered.       This test is only for use under the Food and Drug   Administration s Emergency Use Authorization (EUA).   Commercial kits are provided by [employer].   Performance characteristics of the EUA have been independently  verified by [facility]                           ment of  Pathology and Laboratory Medicine.   _________________________________________________________________  The ID NOW COVID-19 Letter of Authorization, along with the   authorized Fact Sheet for Healthcare Providers, the authorized Fact  Sheet for Patients, and authorized labeling are available on the FDA   website:  www.fda.gov/MedicalDevices/Safety/EmergencySituations/zpn218407.htm  phoned/elidia/10:47/nb  by MEGAB 01/17/2022 10:47      TSH 01/17/2022 1.860  0.400 - 4.000 uIU/mL Final    Comment: Warning:  Heterophilic antibodies in serum or plasma of   certain individuals are known to cause interference with   immunoassays. These antibodies may be present in blood samples   from individuals regularly exposed to animal or who have been   treated with animal products.     Patients taking high doses of supplemental biotin may have  negatively biased results.       WBC 01/17/2022 5.82  3.90 - 12.70 K/uL Final    RBC 01/17/2022 4.29  4.00 - 5.40 M/uL Final    Hemoglobin 01/17/2022 12.5  12.0 - 16.0 g/dL Final    Hematocrit 01/17/2022 38.5  37.0 - 48.5 % Final    MCV 01/17/2022 90  82 - 98 fL Final    MCH 01/17/2022 29.1  27.0 - 31.0 pg Final    MCHC 01/17/2022 32.5  32.0 - 36.0 g/dL Final    RDW 01/17/2022 13.6  11.5 - 14.5 % Final    Platelets 01/17/2022 209  150 - 450 K/uL Final    MPV 01/17/2022 10.8  9.2 - 12.9 fL Final    Immature Granulocytes 01/17/2022 0.3  0.0 - 0.5 % Final    Gran # (ANC) 01/17/2022 3.9  1.8 - 7.7 K/uL Final    Immature Grans (Abs) 01/17/2022 0.02  0.00 - 0.04 K/uL Final    Comment: Mild elevation in immature granulocytes is non specific and   can be seen in a variety of conditions including stress response,   acute inflammation, trauma and pregnancy. Correlation with other   laboratory and clinical findings is essential.      Lymph # 01/17/2022 1.5  1.0 - 4.8 K/uL Final    Mono # 01/17/2022 0.4  0.3 - 1.0 K/uL Final    Eos # 01/17/2022 0.0  0.0 - 0.5 K/uL Final    Baso # 01/17/2022 0.01  0.00 - 0.20 K/uL Final    nRBC 01/17/2022 0  0 /100 WBC Final    Gran % 01/17/2022 66.5  38.0 - 73.0 % Final    Lymph % 01/17/2022 25.8  18.0 - 48.0 % Final    Mono % 01/17/2022 7.2  4.0 - 15.0 % Final    Eosinophil % 01/17/2022 0.0  0.0 - 8.0 % Final    Basophil % 01/17/2022 0.2  0.0 - 1.9 % Final    Differential Method 01/17/2022 Automated   Final    Sodium 01/17/2022 137  136 - 145  mmol/L Final    Potassium 01/17/2022 4.4  3.5 - 5.1 mmol/L Final    Chloride 01/17/2022 102  95 - 110 mmol/L Final    CO2 01/17/2022 26  23 - 29 mmol/L Final    Glucose 01/17/2022 104  70 - 110 mg/dL Final    BUN 01/17/2022 9  7 - 17 mg/dL Final    Creatinine 01/17/2022 0.48 (A) 0.50 - 1.40 mg/dL Final    Calcium 01/17/2022 8.6 (A) 8.7 - 10.5 mg/dL Final    Total Protein 01/17/2022 7.1  6.0 - 8.4 g/dL Final    Albumin 01/17/2022 3.7  3.5 - 5.2 g/dL Final    Total Bilirubin 01/17/2022 0.4  0.1 - 1.0 mg/dL Final    Comment: For infants and newborns, interpretation of results should be based  on gestational age, weight and in agreement with clinical  observations.    Premature Infant recommended reference ranges:  Up to 24 hours.............<8.0 mg/dL  Up to 48 hours............<12.0 mg/dL  3-5 days..................<15.0 mg/dL  6-29 days.................<15.0 mg/dL      Alkaline Phosphatase 01/17/2022 87  38 - 126 U/L Final    AST 01/17/2022 40  15 - 46 U/L Final    ALT 01/17/2022 26  10 - 44 U/L Final    Anion Gap 01/17/2022 9  8 - 16 mmol/L Final    eGFR if African American 01/17/2022 >60.0  >60 mL/min/1.73 m^2 Final    eGFR if non African American 01/17/2022 >60.0  >60 mL/min/1.73 m^2 Final    Comment: Calculation used to obtain the estimated glomerular filtration  rate (eGFR) is the CKD-EPI equation.       Lipase Result 01/17/2022 104  23 - 300 U/L Final    Magnesium 01/17/2022 2.0  1.6 - 2.6 mg/dL Final    Troponin I 01/17/2022 <0.012  0.012 - 0.034 ng/mL Final    Specimen UA 01/17/2022 Urine, Clean Catch   Final    Color, UA 01/17/2022 Yellow  Yellow, Straw, Twial Final    Appearance, UA 01/17/2022 Clear  Clear Final    pH, UA 01/17/2022 6.0  5.0 - 8.0 Final    Specific Gravity, UA 01/17/2022 <=1.005 (A) 1.005 - 1.030 Final    Protein, UA 01/17/2022 Negative  Negative Final    Comment: Recommend a 24 hour urine protein or a urine   protein/creatinine ratio if globulin induced proteinuria  is  clinically suspected.      Glucose, UA 01/17/2022 Negative  Negative Final    Ketones, UA 01/17/2022 Negative  Negative Final    Bilirubin (UA) 01/17/2022 Negative  Negative Final    Occult Blood UA 01/17/2022 Negative  Negative Final    Nitrite, UA 01/17/2022 Negative  Negative Final    Urobilinogen, UA 01/17/2022 Negative  <2.0 EU/dL Final    Leukocytes, UA 01/17/2022 Negative  Negative Final    CPK 01/17/2022 47 (A) 55 - 170 U/L Final     Lab Results   Component Value Date    CHOL 240 (H) 03/04/2022    CHOL 242 (H) 07/06/2021    CHOL 220 (H) 12/30/2020     Lab Results   Component Value Date    HDL 74 03/04/2022    HDL 78 (H) 07/06/2021    HDL 71 12/30/2020     Lab Results   Component Value Date    LDLCALC 150.2 03/04/2022    LDLCALC 147.6 07/06/2021    LDLCALC 132.6 12/30/2020     Lab Results   Component Value Date    TRIG 79 03/04/2022    TRIG 82 07/06/2021    TRIG 82 12/30/2020     Lab Results   Component Value Date    CHOLHDL 30.8 03/04/2022    CHOLHDL 32.2 07/06/2021    CHOLHDL 32.3 12/30/2020     CMP  Sodium   Date Value Ref Range Status   03/04/2022 143 136 - 145 mmol/L Final     Potassium   Date Value Ref Range Status   03/04/2022 4.7 3.5 - 5.1 mmol/L Final     Chloride   Date Value Ref Range Status   03/04/2022 103 95 - 110 mmol/L Final     CO2   Date Value Ref Range Status   03/04/2022 29 23 - 29 mmol/L Final     Glucose   Date Value Ref Range Status   03/04/2022 97 70 - 110 mg/dL Final     BUN   Date Value Ref Range Status   03/04/2022 13 7 - 17 mg/dL Final     Creatinine   Date Value Ref Range Status   03/04/2022 0.64 0.50 - 1.40 mg/dL Final     Calcium   Date Value Ref Range Status   03/04/2022 9.1 8.7 - 10.5 mg/dL Final     Total Protein   Date Value Ref Range Status   03/04/2022 7.6 6.0 - 8.4 g/dL Final     Albumin   Date Value Ref Range Status   03/04/2022 4.0 3.5 - 5.2 g/dL Final     Total Bilirubin   Date Value Ref Range Status   03/04/2022 0.5 0.1 - 1.0 mg/dL Final     Comment:      For infants and newborns, interpretation of results should be based  on gestational age, weight and in agreement with clinical  observations.    Premature Infant recommended reference ranges:  Up to 24 hours.............<8.0 mg/dL  Up to 48 hours............<12.0 mg/dL  3-5 days..................<15.0 mg/dL  6-29 days.................<15.0 mg/dL       Alkaline Phosphatase   Date Value Ref Range Status   03/04/2022 98 38 - 126 U/L Final     AST   Date Value Ref Range Status   03/04/2022 26 15 - 46 U/L Final     ALT   Date Value Ref Range Status   03/04/2022 19 10 - 44 U/L Final     Anion Gap   Date Value Ref Range Status   03/04/2022 11 8 - 16 mmol/L Final     eGFR if    Date Value Ref Range Status   03/04/2022 >60.0 >60 mL/min/1.73 m^2 Final     eGFR if non    Date Value Ref Range Status   03/04/2022 >60.0 >60 mL/min/1.73 m^2 Final     Comment:     Calculation used to obtain the estimated glomerular filtration  rate (eGFR) is the CKD-EPI equation.        Lab Results   Component Value Date    WBC 7.35 03/04/2022    HGB 12.8 03/04/2022    HCT 40.7 03/04/2022    MCV 93 03/04/2022     03/04/2022       10/8/2018      Narrative & Impression  EXAMINATION:  US SOFT TISSUE HEAD NECK THYROID     CLINICAL HISTORY:  Localized swelling, mass and lump, neck     TECHNIQUE:  Ultrasound of the thyroid and cervical lymph nodes was performed.     FINDINGS:  The right and left lobes of the thyroid measure 2.9 x 0.9 x 0.9 cm and 2.9 x 1.1 x 0.6 cm respectively.  There are 2 small nodules within the right lobe measuring 2-3 mm each.  The left lobe has a homogeneous echotexture.     IMPRESSION:      Two small nodules within the right lobe which do not meet the size criteria for fine needle aspiration.        Electronically signed by: Anthony Bowles MD  Date:                                            10/08/2018  Time:                                           11:38  5/16/2019      Narrative &  Impression  EXAMINATION:  US THYROID     CLINICAL HISTORY:  Nontoxic multinodular goiter     TECHNIQUE:  Ultrasound of the thyroid and cervical lymph nodes was performed.     COMPARISON:  Two thousand eighteen     FINDINGS:  Right lobe 2.9 x 0.8 x 1.1 cm, left lobe 2.3 x 0.7 x 0.7 cm and thyroid isthmus 0.18 cm.     Right lobe upper pole contains 2 nodules of 0.3 and 0.2 cm size the larger heterogeneous and complex.  The smaller cystic.  No significant cervical adenopathy.     IMPRESSION:      Multinodular goiter without significant change from 2018.        Electronically signed by: Rashel Bravo MD  Date:                                            05/16/2019  Time:                                           11:05  6/17/2020 Routine     Narrative & Impression  EXAMINATION:  US THYROID     CLINICAL HISTORY:  Nontoxic multinodular goiter     TECHNIQUE:  Ultrasound of the thyroid and cervical lymph nodes was performed.     FINDINGS:  The right and left lobes of the thyroid measure 2.4 x 1.1 x 1.3 cm and 2.3 x 0.6 x 0.6 cm respectively.  There is a 0.3 cm hypoechoic nodule within the right lobe.  The left lobe has a homogeneous echotexture.  There is a nonenlarged cervical lymph node on the left.     Impression:     Subcentimeter right-sided thyroid nodule which does not meet the size criteria for fine needle aspiration.        Electronically signed by: Anthony Bowles MD  Date:                                            06/17/2020  Time:                                           10:35        ASSESSMENT/PLAN:  Problem List Items Addressed This Visit        ENT    Impacted cerumen of right ear    Overview     - offered ear irrigation however patient declined  - instructed her to use Debrox OTC and notify me if no improvement to return for ear irrigation              Cardiac/Vascular    Essential hypertension - Primary    Overview     - well controlled without medication  - okay remain off of amlodipine           Relevant  Orders    Comprehensive Metabolic Panel    CBC Without Differential    Pure hypercholesterolemia    Overview     Lab Results   Component Value Date    LDLCALC 150.2 03/04/2022     - unable tolerate rosuvastatin 5 mg daily  - recommend goal LDL <140  - discussed recommendation for diet and cardiovascular exercise  - counseling on lifestyle modifications for risk factor reduction  - discussed trying pravastatin 10 mg nightly with CoQ10 100 mg daily and uptitrate as tolerated  - patient declined  - lipids followed by Endocrinology           Relevant Orders    Comprehensive Metabolic Panel    CBC Without Differential    Lipid Panel       Endocrine    Hypothyroidism    Overview     - transferring care from endocrinology  - reviewed Endocrinology labs  Lab Results   Component Value Date    TSH 2.590 03/04/2022    E6QRPPR 8.5 12/30/2020    FREET4 0.98 03/04/2022     - well controlled  - continue current medication           Relevant Medications    SYNTHROID 50 mcg tablet    Other Relevant Orders    T4, Free    TSH    Nontoxic multinodular goiter    Overview     - 10/8/2018 Thyroid US: Two small nodules within the right lobe which do not meet the size criteria for fine needle aspiration.  - 06/17/2020 thyroid ultrasound: There is a 0.3 cm hypoechoic nodule within the right lobe.  The left lobe has a homogeneous echotexture.  There is a nonenlarged cervical lymph node on the left   - stable to improved           Relevant Medications    SYNTHROID 50 mcg tablet    Other Relevant Orders    T4, Free    TSH       Orthopedic    Age-related osteoporosis without current pathological fracture    Overview     - 3/3/2021 Dexa: Tscore -2.9 osteoporosis (reviewed 2016 bone density also showed osteoporosis)   - patient reports she has never been on medication  - recommend start Fosamax 70 mg weekly but patient later notified me that she had a severe reaction Fosamax in the past.  She does not recall the reaction  - discussed starting  Prolia  - patient does not want to start any medication at this time   - fall prevention  - weight bearing exercises like walking, squats, stair and jogging if able  - over the counter vitamin D3 5287-7014 units daily  - dietary calcium 1200 mg per day with diet or supplements   - repeat bone density scan in 2  years                Other    Statin intolerance    Overview     - was on rosuvastatin 5 mg stopped due to pain                 ORDERS:     Orders Placed This Encounter    Comprehensive Metabolic Panel    CBC Without Differential    Lipid Panel    T4, Free    TSH    SYNTHROID 50 mcg tablet       Vaccines recommended: Covid-19, PCV 13, flu and pt declined all vaccine    Follow-up in 6-12 months or sooner if any concerns.    This note is dictated using the M*Modal Fluency Direct word recognition program. There are word recognition mistakes that are occasionally missed on review.  Dr. Radha Cherry D.O.   [facility]

## 2022-04-07 ENCOUNTER — OFFICE VISIT (OUTPATIENT)
Dept: FAMILY MEDICINE | Facility: CLINIC | Age: 82
End: 2022-04-07
Payer: MEDICARE

## 2022-04-07 VITALS
WEIGHT: 133.81 LBS | SYSTOLIC BLOOD PRESSURE: 132 MMHG | HEIGHT: 61 IN | OXYGEN SATURATION: 98 % | BODY MASS INDEX: 25.27 KG/M2 | DIASTOLIC BLOOD PRESSURE: 62 MMHG | HEART RATE: 66 BPM

## 2022-04-07 DIAGNOSIS — I10 ESSENTIAL HYPERTENSION: Primary | ICD-10-CM

## 2022-04-07 DIAGNOSIS — H61.21 IMPACTED CERUMEN OF RIGHT EAR: ICD-10-CM

## 2022-04-07 DIAGNOSIS — M81.0 AGE-RELATED OSTEOPOROSIS WITHOUT CURRENT PATHOLOGICAL FRACTURE: ICD-10-CM

## 2022-04-07 DIAGNOSIS — Z78.9 STATIN INTOLERANCE: ICD-10-CM

## 2022-04-07 DIAGNOSIS — E04.2 NONTOXIC MULTINODULAR GOITER: ICD-10-CM

## 2022-04-07 DIAGNOSIS — E03.9 HYPOTHYROIDISM, UNSPECIFIED TYPE: ICD-10-CM

## 2022-04-07 DIAGNOSIS — E78.00 PURE HYPERCHOLESTEROLEMIA: ICD-10-CM

## 2022-04-07 PROCEDURE — 3075F SYST BP GE 130 - 139MM HG: CPT | Mod: CPTII,S$GLB,, | Performed by: FAMILY MEDICINE

## 2022-04-07 PROCEDURE — 3078F PR MOST RECENT DIASTOLIC BLOOD PRESSURE < 80 MM HG: ICD-10-PCS | Mod: CPTII,S$GLB,, | Performed by: FAMILY MEDICINE

## 2022-04-07 PROCEDURE — 3075F PR MOST RECENT SYSTOLIC BLOOD PRESS GE 130-139MM HG: ICD-10-PCS | Mod: CPTII,S$GLB,, | Performed by: FAMILY MEDICINE

## 2022-04-07 PROCEDURE — 1159F PR MEDICATION LIST DOCUMENTED IN MEDICAL RECORD: ICD-10-PCS | Mod: CPTII,S$GLB,, | Performed by: FAMILY MEDICINE

## 2022-04-07 PROCEDURE — 99214 PR OFFICE/OUTPT VISIT, EST, LEVL IV, 30-39 MIN: ICD-10-PCS | Mod: S$GLB,,, | Performed by: FAMILY MEDICINE

## 2022-04-07 PROCEDURE — 1160F PR REVIEW ALL MEDS BY PRESCRIBER/CLIN PHARMACIST DOCUMENTED: ICD-10-PCS | Mod: CPTII,S$GLB,, | Performed by: FAMILY MEDICINE

## 2022-04-07 PROCEDURE — 1125F AMNT PAIN NOTED PAIN PRSNT: CPT | Mod: CPTII,S$GLB,, | Performed by: FAMILY MEDICINE

## 2022-04-07 PROCEDURE — 1101F PR PT FALLS ASSESS DOC 0-1 FALLS W/OUT INJ PAST YR: ICD-10-PCS | Mod: CPTII,S$GLB,, | Performed by: FAMILY MEDICINE

## 2022-04-07 PROCEDURE — 1160F RVW MEDS BY RX/DR IN RCRD: CPT | Mod: CPTII,S$GLB,, | Performed by: FAMILY MEDICINE

## 2022-04-07 PROCEDURE — 3078F DIAST BP <80 MM HG: CPT | Mod: CPTII,S$GLB,, | Performed by: FAMILY MEDICINE

## 2022-04-07 PROCEDURE — 3288F FALL RISK ASSESSMENT DOCD: CPT | Mod: CPTII,S$GLB,, | Performed by: FAMILY MEDICINE

## 2022-04-07 PROCEDURE — 1101F PT FALLS ASSESS-DOCD LE1/YR: CPT | Mod: CPTII,S$GLB,, | Performed by: FAMILY MEDICINE

## 2022-04-07 PROCEDURE — 3288F PR FALLS RISK ASSESSMENT DOCUMENTED: ICD-10-PCS | Mod: CPTII,S$GLB,, | Performed by: FAMILY MEDICINE

## 2022-04-07 PROCEDURE — 99214 OFFICE O/P EST MOD 30 MIN: CPT | Mod: S$GLB,,, | Performed by: FAMILY MEDICINE

## 2022-04-07 PROCEDURE — 99999 PR PBB SHADOW E&M-EST. PATIENT-LVL III: ICD-10-PCS | Mod: PBBFAC,,, | Performed by: FAMILY MEDICINE

## 2022-04-07 PROCEDURE — 1125F PR PAIN SEVERITY QUANTIFIED, PAIN PRESENT: ICD-10-PCS | Mod: CPTII,S$GLB,, | Performed by: FAMILY MEDICINE

## 2022-04-07 PROCEDURE — 1159F MED LIST DOCD IN RCRD: CPT | Mod: CPTII,S$GLB,, | Performed by: FAMILY MEDICINE

## 2022-04-07 PROCEDURE — 99999 PR PBB SHADOW E&M-EST. PATIENT-LVL III: CPT | Mod: PBBFAC,,, | Performed by: FAMILY MEDICINE

## 2022-04-07 RX ORDER — LEVOTHYROXINE SODIUM 50 UG/1
50 TABLET ORAL
Qty: 90 TABLET | Refills: 3 | Status: SHIPPED | OUTPATIENT
Start: 2022-04-07 | End: 2022-08-03 | Stop reason: SDUPTHER

## 2022-04-07 RX ORDER — LEVOTHYROXINE SODIUM 50 UG/1
50 TABLET ORAL
Qty: 90 TABLET | Refills: 3 | Status: SHIPPED | OUTPATIENT
Start: 2022-04-07 | End: 2022-04-07 | Stop reason: CLARIF

## 2022-05-19 ENCOUNTER — OFFICE VISIT (OUTPATIENT)
Dept: FAMILY MEDICINE | Facility: CLINIC | Age: 82
End: 2022-05-19
Payer: MEDICARE

## 2022-05-19 VITALS
DIASTOLIC BLOOD PRESSURE: 68 MMHG | HEART RATE: 86 BPM | BODY MASS INDEX: 25.39 KG/M2 | OXYGEN SATURATION: 96 % | WEIGHT: 134.5 LBS | HEIGHT: 61 IN | SYSTOLIC BLOOD PRESSURE: 120 MMHG

## 2022-05-19 DIAGNOSIS — J30.9 ALLERGIC RHINITIS, UNSPECIFIED SEASONALITY, UNSPECIFIED TRIGGER: Primary | ICD-10-CM

## 2022-05-19 DIAGNOSIS — R05.9 COUGH: ICD-10-CM

## 2022-05-19 DIAGNOSIS — R09.82 POST-NASAL DRIP: ICD-10-CM

## 2022-05-19 PROCEDURE — 1101F PT FALLS ASSESS-DOCD LE1/YR: CPT | Mod: CPTII,S$GLB,,

## 2022-05-19 PROCEDURE — 1159F MED LIST DOCD IN RCRD: CPT | Mod: CPTII,S$GLB,,

## 2022-05-19 PROCEDURE — 3288F PR FALLS RISK ASSESSMENT DOCUMENTED: ICD-10-PCS | Mod: CPTII,S$GLB,,

## 2022-05-19 PROCEDURE — 1126F PR PAIN SEVERITY QUANTIFIED, NO PAIN PRESENT: ICD-10-PCS | Mod: CPTII,S$GLB,,

## 2022-05-19 PROCEDURE — 3074F PR MOST RECENT SYSTOLIC BLOOD PRESSURE < 130 MM HG: ICD-10-PCS | Mod: CPTII,S$GLB,,

## 2022-05-19 PROCEDURE — 99214 OFFICE O/P EST MOD 30 MIN: CPT | Mod: S$GLB,,,

## 2022-05-19 PROCEDURE — 1159F PR MEDICATION LIST DOCUMENTED IN MEDICAL RECORD: ICD-10-PCS | Mod: CPTII,S$GLB,,

## 2022-05-19 PROCEDURE — 1160F RVW MEDS BY RX/DR IN RCRD: CPT | Mod: CPTII,S$GLB,,

## 2022-05-19 PROCEDURE — 99999 PR PBB SHADOW E&M-EST. PATIENT-LVL IV: CPT | Mod: PBBFAC,,,

## 2022-05-19 PROCEDURE — 99999 PR PBB SHADOW E&M-EST. PATIENT-LVL IV: ICD-10-PCS | Mod: PBBFAC,,,

## 2022-05-19 PROCEDURE — 1160F PR REVIEW ALL MEDS BY PRESCRIBER/CLIN PHARMACIST DOCUMENTED: ICD-10-PCS | Mod: CPTII,S$GLB,,

## 2022-05-19 PROCEDURE — 3078F DIAST BP <80 MM HG: CPT | Mod: CPTII,S$GLB,,

## 2022-05-19 PROCEDURE — 1101F PR PT FALLS ASSESS DOC 0-1 FALLS W/OUT INJ PAST YR: ICD-10-PCS | Mod: CPTII,S$GLB,,

## 2022-05-19 PROCEDURE — 3074F SYST BP LT 130 MM HG: CPT | Mod: CPTII,S$GLB,,

## 2022-05-19 PROCEDURE — 1126F AMNT PAIN NOTED NONE PRSNT: CPT | Mod: CPTII,S$GLB,,

## 2022-05-19 PROCEDURE — 99214 PR OFFICE/OUTPT VISIT, EST, LEVL IV, 30-39 MIN: ICD-10-PCS | Mod: S$GLB,,,

## 2022-05-19 PROCEDURE — 3078F PR MOST RECENT DIASTOLIC BLOOD PRESSURE < 80 MM HG: ICD-10-PCS | Mod: CPTII,S$GLB,,

## 2022-05-19 PROCEDURE — 3288F FALL RISK ASSESSMENT DOCD: CPT | Mod: CPTII,S$GLB,,

## 2022-05-19 RX ORDER — AZELASTINE 1 MG/ML
1 SPRAY, METERED NASAL 2 TIMES DAILY
Qty: 30 ML | Refills: 0 | Status: SHIPPED | OUTPATIENT
Start: 2022-05-19 | End: 2022-09-26

## 2022-05-19 NOTE — PROGRESS NOTES
Subjective:       Patient ID: Joanne Daniel is a 82 y.o. female.    Chief Complaint: Cough (Running nose) and Fatigue    Joanne Daniel is an 82 y.o female patient of Dr. Cherry with hypercholesterolemia, hypertension, hypothyroidism, primary osteoarthritis of both knees, urethral caruncle, atropic vaginitis, neuropathy, left foot drop, unknown to me, presents today with her  with c/o cough x 3 weeks that is gradually improving, reports having COVID in January, given Benzonatate at that time, just tried to take it and it didn't work, does not want this medication anymore. Denies chest pain, shortness of breath, fever, unintentional weight loss, thick purulent sputum.     Cough  This is a new problem. The current episode started 1 to 4 weeks ago. The problem has been gradually improving. The problem occurs every few hours. The cough is productive of sputum (started as a dry cough,  with sputum a few days ago; reports a small amount of yellowish sputum, not coughing up much, reports blowing nose). Associated symptoms include nasal congestion, postnasal drip, rhinorrhea and a sore throat. Pertinent negatives include no chest pain, ear congestion, ear pain, fever, headaches, heartburn, hemoptysis, myalgias, rash, shortness of breath, sweats, weight loss or wheezing. Nothing (Patient reports they love being outdoors. ) aggravates the symptoms. She has tried OTC cough suppressant (reports trying Claritin, Mucinex, Benzonatate (made her nose run and didnt stop the cough), Tussin at night which helps her sleep) for the symptoms. The treatment provided mild relief. There is no history of asthma, bronchiectasis, bronchitis, COPD, emphysema, environmental allergies or pneumonia.       Past Medical History:   Diagnosis Date    Anemia of chronic disease 10/22/2018    Colon polyps     Foot drop, left foot     Glaucoma     Hyperlipidemia     Thrombocytosis 10/2/2018    Urinary tract infection     Vaginal infection         Past Surgical History:   Procedure Laterality Date    COLONOSCOPY  08/09/2019    Mild diverticulosis of the desending colon and sigmoid colon. Polyp 12 mm in the hepatic flexure    EYE SURGERY Right     POLYPECTOMY         Family History   Problem Relation Age of Onset    Heart disease Neg Hx     Cancer Neg Hx     Kidney disease Neg Hx     Breast cancer Neg Hx     Ovarian cancer Neg Hx     Colon cancer Neg Hx        Social History     Socioeconomic History    Marital status:      Spouse name: Antonino    Number of children: 3   Tobacco Use    Smoking status: Never Smoker    Smokeless tobacco: Never Used   Substance and Sexual Activity    Alcohol use: No     Alcohol/week: 0.0 standard drinks    Drug use: No    Sexual activity: Yes     Partners: Male   Social History Narrative    Lives with her  in [place]. 3 children and 5 grandchildren. [employer]. Born-again Conf[employer]ianist       Review of Systems   Constitutional: Negative for appetite change, fatigue, fever and weight loss.   HENT: Positive for postnasal drip, rhinorrhea, sneezing and sore throat. Negative for congestion, ear pain, hearing loss, sinus pressure and sinus pain.    Eyes: Negative for pain and visual disturbance.   Respiratory: Positive for cough. Negative for hemoptysis, shortness of breath and wheezing.    Cardiovascular: Negative for chest pain and leg swelling.   Gastrointestinal: Negative for abdominal pain, constipation, diarrhea, heartburn, nausea and vomiting.   Endocrine: Negative.    Genitourinary: Negative for decreased urine volume, difficulty urinating, dysuria, frequency, hematuria and urgency.   Musculoskeletal: Negative for arthralgias and myalgias.   Skin: Negative for color change and rash.   Allergic/Immunologic: Negative for environmental allergies.   Neurological: Negative for dizziness, syncope, weakness, light-headedness, numbness and headaches.   Psychiatric/Behavioral: Negative.          Objective:  "    Vitals:    05/19/22 0805   BP: 120/68   BP Location: Left arm   Patient Position: Sitting   BP Method: Medium (Manual)   Pulse: 86   SpO2: 96%   Weight: 61 kg (134 lb 7.7 oz)   Height: 5' 1" (1.549 m)          Physical Exam  Vitals reviewed.   Constitutional:       Appearance: Normal appearance. She is well-developed and well-groomed.   HENT:      Head: Normocephalic and atraumatic.      Right Ear: Hearing normal. There is impacted cerumen.      Left Ear: Hearing normal. There is impacted cerumen.      Nose: Congestion and rhinorrhea present.      Right Turbinates: Swollen.      Left Turbinates: Not swollen.      Right Sinus: No maxillary sinus tenderness or frontal sinus tenderness.      Left Sinus: No maxillary sinus tenderness or frontal sinus tenderness.      Mouth/Throat:      Pharynx: Oropharynx is clear. No oropharyngeal exudate or posterior oropharyngeal erythema.   Eyes:      Pupils: Pupils are equal, round, and reactive to light.   Cardiovascular:      Rate and Rhythm: Normal rate and regular rhythm.      Heart sounds: Normal heart sounds. No murmur heard.  Pulmonary:      Effort: Pulmonary effort is normal.      Breath sounds: Normal breath sounds. No wheezing, rhonchi or rales.      Comments: + nonproductive cough, cleared throat a few times  Musculoskeletal:         General: Normal range of motion.      Cervical back: Normal range of motion.   Skin:     General: Skin is warm and dry.      Capillary Refill: Capillary refill takes less than 2 seconds.   Neurological:      General: No focal deficit present.      Mental Status: She is alert and oriented to person, place, and time.   Psychiatric:         Attention and Perception: Attention normal.         Mood and Affect: Mood normal.         Speech: Speech normal.         Behavior: Behavior normal. Behavior is cooperative.         Thought Content: Thought content normal.         Judgment: Judgment normal.         Labs reviewed for medication " dosing      Component      Latest Ref Rng & Units 3/4/2022   WBC      3.90 - 12.70 K/uL 7.35   RBC      4.00 - 5.40 M/uL 4.39   Hemoglobin      12.0 - 16.0 g/dL 12.8   Hematocrit      37.0 - 48.5 % 40.7   MCV      82 - 98 fL 93   MCH      27.0 - 31.0 pg 29.2   MCHC      32.0 - 36.0 g/dL 31.4 (L)   RDW      11.5 - 14.5 % 14.4   Platelets      150 - 450 K/uL 205   MPV      9.2 - 12.9 fL 11.6   Immature Granulocytes      0.0 - 0.5 % 0.3   Gran # (ANC)      1.8 - 7.7 K/uL 3.4   Immature Grans (Abs)      0.00 - 0.04 K/uL 0.02   Lymph #      1.0 - 4.8 K/uL 2.5   Mono #      0.3 - 1.0 K/uL 0.6   Eos #      0.0 - 0.5 K/uL 0.8 (H)   Baso #      0.00 - 0.20 K/uL 0.08   nRBC      0 /100 WBC 0   Gran %      38.0 - 73.0 % 46.1   Lymph %      18.0 - 48.0 % 33.5   Mono %      4.0 - 15.0 % 8.0   Eosinophil %      0.0 - 8.0 % 11.0 (H)   Basophil %      0.0 - 1.9 % 1.1   Platelet Estimate       Appears normal   Differential Method       Automated   Sodium      136 - 145 mmol/L 143   Potassium      3.5 - 5.1 mmol/L 4.7   Chloride      95 - 110 mmol/L 103   CO2      23 - 29 mmol/L 29   Glucose      70 - 110 mg/dL 97   BUN      7 - 17 mg/dL 13   Creatinine      0.50 - 1.40 mg/dL 0.64   Calcium      8.7 - 10.5 mg/dL 9.1   PROTEIN TOTAL      6.0 - 8.4 g/dL 7.6   Albumin      3.5 - 5.2 g/dL 4.0   BILIRUBIN TOTAL      0.1 - 1.0 mg/dL 0.5   Alkaline Phosphatase      38 - 126 U/L 98   AST      15 - 46 U/L 26   ALT      10 - 44 U/L 19   Anion Gap      8 - 16 mmol/L 11   eGFR if African American      >60 mL/min/1.73 m:2 >60.0   eGFR if non African American      >60 mL/min/1.73 m:2 >60.0   Cholesterol      120 - 199 mg/dL 240 (H)   Triglycerides      30 - 150 mg/dL 79   HDL      40 - 75 mg/dL 74   LDL Cholesterol External      63.0 - 159.0 mg/dL 150.2   HDL/Cholesterol Ratio      20.0 - 50.0 % 30.8   Total Cholesterol/HDL Ratio      2.0 - 5.0 3.2   Non-HDL Cholesterol      mg/dL 166   Free T4      0.71 - 1.51 ng/dL 0.98   TSH      0.400 - 4.000  uIU/mL 2.590       Assessment:         ICD-10-CM ICD-9-CM   1. Allergic rhinitis, unspecified seasonality, unspecified trigger  J30.9 477.9   2. Post-nasal drip  R09.82 784.91   3. Cough  R05.9 786.2       Plan:       Allergic rhinitis, unspecified seasonality, unspecified trigger  Continue Claritin daily    Post-nasal drip  -     azelastine (ASTELIN) 137 mcg (0.1 %) nasal spray; 1 spray (137 mcg total) by Nasal route 2 (two) times daily.  Dispense: 30 mL; Refill: 0    Cough    -Continue Mucinex twice per day   -Continue Tussin PM at night for cough  -use Azelastin nasal spray twice per day, instructed on proper use  -increase fluid intake    Return to clinic in 1 week if symptoms persist and/or no relief in symptoms for possible chest x-ray and antibitoc therapy     Patient and spouse verbalizes understanding and agrees with treatment plan.     Follow up if symptoms worsen or fail to improve.             Patient's Medications   New Prescriptions    AZELASTINE (ASTELIN) 137 MCG (0.1 %) NASAL SPRAY    1 spray (137 mcg total) by Nasal route 2 (two) times daily.   Previous Medications    CALCIUM-MAG OXIDE-VITAMIN D3 185- MG-MG-UNIT CAP    Take by mouth 3 (three) times daily.    ERGOCALCIFEROL (ERGOCALCIFEROL) 50,000 UNIT CAP    Take 50,000 Units by mouth every 7 days.    ESTRADIOL (ESTRACE) 0.01 % (0.1 MG/GRAM) VAGINAL CREAM    Place 1 g vaginally twice a week.    MAGNESIUM OXIDE 200 MG MAGNESIUM TAB    Take 1 tablet by mouth.    ONDANSETRON (ZOFRAN-ODT) 4 MG TBDL    Take 1 tablet (4 mg total) by mouth every 6 (six) hours as needed (nausea).    SYNTHROID 50 MCG TABLET    Take 1 tablet (50 mcg total) by mouth before breakfast.    THIAMINE 100 MG TABLET    Take 100 mg by mouth once daily.   Modified Medications    No medications on file   Discontinued Medications    No medications on file       Kristyn Villar NP

## 2022-05-19 NOTE — PATIENT INSTRUCTIONS
-Take Mucinex twice per day  -Take Tussin PM at night  -use Azelastin nasal spray    Return to clinic if no relief in 1 week

## 2022-08-03 DIAGNOSIS — E03.9 HYPOTHYROIDISM, UNSPECIFIED TYPE: ICD-10-CM

## 2022-08-03 DIAGNOSIS — E04.2 NONTOXIC MULTINODULAR GOITER: ICD-10-CM

## 2022-08-03 RX ORDER — LEVOTHYROXINE SODIUM 50 UG/1
50 TABLET ORAL
Qty: 90 TABLET | Refills: 3 | Status: SHIPPED | OUTPATIENT
Start: 2022-08-03 | End: 2022-09-26

## 2022-08-03 NOTE — TELEPHONE ENCOUNTER
No new care gaps identified.  [facility] Embedded Care Gaps. Reference number: [id number]. 8/03/2022   10:06:10 AM ESTHERT

## 2022-08-03 NOTE — TELEPHONE ENCOUNTER
----- Message from Roldan Lancaster sent at 8/3/2022  9:06 AM CDT -----  Contact: [phone]  Who Called: PT    Regarding:   pt states she does not want  he generic medication for her thyroid. She is also due for a refill. Would like to speak with a nurse.     Would the patient rather a call back or a response via [facility]? Call back    Best Call Back Number: [phone]     Additional Information: n/a

## 2022-08-22 ENCOUNTER — TELEPHONE (OUTPATIENT)
Dept: FAMILY MEDICINE | Facility: CLINIC | Age: 82
End: 2022-08-22
Payer: MEDICARE

## 2022-08-22 ENCOUNTER — PATIENT MESSAGE (OUTPATIENT)
Dept: FAMILY MEDICINE | Facility: CLINIC | Age: 82
End: 2022-08-22

## 2022-08-22 ENCOUNTER — OFFICE VISIT (OUTPATIENT)
Dept: FAMILY MEDICINE | Facility: CLINIC | Age: 82
End: 2022-08-22
Payer: MEDICARE

## 2022-08-22 VITALS
WEIGHT: 136.63 LBS | RESPIRATION RATE: 18 BRPM | DIASTOLIC BLOOD PRESSURE: 74 MMHG | HEART RATE: 75 BPM | BODY MASS INDEX: 25.79 KG/M2 | SYSTOLIC BLOOD PRESSURE: 126 MMHG | OXYGEN SATURATION: 96 % | HEIGHT: 61 IN

## 2022-08-22 DIAGNOSIS — M21.372 LEFT FOOT DROP: ICD-10-CM

## 2022-08-22 DIAGNOSIS — G62.9 NEUROPATHY: ICD-10-CM

## 2022-08-22 DIAGNOSIS — Z86.010 HISTORY OF COLON POLYPS: ICD-10-CM

## 2022-08-22 DIAGNOSIS — R42 DIZZINESS AND GIDDINESS: Primary | ICD-10-CM

## 2022-08-22 DIAGNOSIS — E03.9 HYPOTHYROIDISM, UNSPECIFIED TYPE: ICD-10-CM

## 2022-08-22 DIAGNOSIS — R53.83 OTHER FATIGUE: ICD-10-CM

## 2022-08-22 DIAGNOSIS — I10 ESSENTIAL HYPERTENSION: ICD-10-CM

## 2022-08-22 PROCEDURE — 1159F PR MEDICATION LIST DOCUMENTED IN MEDICAL RECORD: ICD-10-PCS | Mod: CPTII,S$GLB,, | Performed by: FAMILY MEDICINE

## 2022-08-22 PROCEDURE — 1160F PR REVIEW ALL MEDS BY PRESCRIBER/CLIN PHARMACIST DOCUMENTED: ICD-10-PCS | Mod: CPTII,S$GLB,, | Performed by: FAMILY MEDICINE

## 2022-08-22 PROCEDURE — 1126F PR PAIN SEVERITY QUANTIFIED, NO PAIN PRESENT: ICD-10-PCS | Mod: CPTII,S$GLB,, | Performed by: FAMILY MEDICINE

## 2022-08-22 PROCEDURE — 3074F PR MOST RECENT SYSTOLIC BLOOD PRESSURE < 130 MM HG: ICD-10-PCS | Mod: CPTII,S$GLB,, | Performed by: FAMILY MEDICINE

## 2022-08-22 PROCEDURE — 99999 PR PBB SHADOW E&M-EST. PATIENT-LVL V: ICD-10-PCS | Mod: PBBFAC,,, | Performed by: FAMILY MEDICINE

## 2022-08-22 PROCEDURE — 3074F SYST BP LT 130 MM HG: CPT | Mod: CPTII,S$GLB,, | Performed by: FAMILY MEDICINE

## 2022-08-22 PROCEDURE — 3078F PR MOST RECENT DIASTOLIC BLOOD PRESSURE < 80 MM HG: ICD-10-PCS | Mod: CPTII,S$GLB,, | Performed by: FAMILY MEDICINE

## 2022-08-22 PROCEDURE — 3288F FALL RISK ASSESSMENT DOCD: CPT | Mod: CPTII,S$GLB,, | Performed by: FAMILY MEDICINE

## 2022-08-22 PROCEDURE — 3288F PR FALLS RISK ASSESSMENT DOCUMENTED: ICD-10-PCS | Mod: CPTII,S$GLB,, | Performed by: FAMILY MEDICINE

## 2022-08-22 PROCEDURE — 99999 PR PBB SHADOW E&M-EST. PATIENT-LVL V: CPT | Mod: PBBFAC,,, | Performed by: FAMILY MEDICINE

## 2022-08-22 PROCEDURE — 1126F AMNT PAIN NOTED NONE PRSNT: CPT | Mod: CPTII,S$GLB,, | Performed by: FAMILY MEDICINE

## 2022-08-22 PROCEDURE — 99215 PR OFFICE/OUTPT VISIT, EST, LEVL V, 40-54 MIN: ICD-10-PCS | Mod: S$GLB,,, | Performed by: FAMILY MEDICINE

## 2022-08-22 PROCEDURE — 1160F RVW MEDS BY RX/DR IN RCRD: CPT | Mod: CPTII,S$GLB,, | Performed by: FAMILY MEDICINE

## 2022-08-22 PROCEDURE — 1159F MED LIST DOCD IN RCRD: CPT | Mod: CPTII,S$GLB,, | Performed by: FAMILY MEDICINE

## 2022-08-22 PROCEDURE — 99215 OFFICE O/P EST HI 40 MIN: CPT | Mod: S$GLB,,, | Performed by: FAMILY MEDICINE

## 2022-08-22 PROCEDURE — 3078F DIAST BP <80 MM HG: CPT | Mod: CPTII,S$GLB,, | Performed by: FAMILY MEDICINE

## 2022-08-22 PROCEDURE — 1100F PR PT FALLS ASSESS DOC 2+ FALLS/FALL W/INJURY/YR: ICD-10-PCS | Mod: CPTII,S$GLB,, | Performed by: FAMILY MEDICINE

## 2022-08-22 PROCEDURE — 1100F PTFALLS ASSESS-DOCD GE2>/YR: CPT | Mod: CPTII,S$GLB,, | Performed by: FAMILY MEDICINE

## 2022-08-22 NOTE — TELEPHONE ENCOUNTER
----- Message from Paula De Leon MD sent at 8/21/2022  6:07 PM CDT -----  Radha,   Hope you are doing well. Mrs Daniel came to the ER on Saturday secondary to elevated blood pressure. She had mild dizziness. No neuro deficits or evidence of end organ damage. She is anxious. I advised her to check and record her BP and to make an appointment to follow up with you. I did not start her on any meds. Thanks, Paula

## 2022-08-22 NOTE — TELEPHONE ENCOUNTER
Please call Joanne Daniel. She was seen in ER over 8/20/22. Please offer her an ER follow-up visit today 8/22/22 11:30 or 12 noon open slots    Dr. Radha Cherry D.O.   [facility]
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
## 2022-08-23 ENCOUNTER — TELEPHONE (OUTPATIENT)
Dept: FAMILY MEDICINE | Facility: CLINIC | Age: 82
End: 2022-08-23
Payer: MEDICARE

## 2022-08-23 NOTE — TELEPHONE ENCOUNTER
----- Message from Roslyn Ng sent at 8/22/2022  4:27 PM CDT -----  Contact: pt  .Type:  Patient Returning Call    Who Called Pt  Who Left Message for Patient:Dr. Cherry  Does the patient know what this is regarding?:yes  Would the patient rather a call back or a response via [facility]?Call Back  Best Call Back Number:[phone]  Additional Information:     Pt returning a call she received  Would like the provider to call back.

## 2022-08-23 NOTE — TELEPHONE ENCOUNTER
----- Message from Radha Cherry DO sent at 8/23/2022  8:21 AM CDT -----  Please call Joanne Daniel.MRI and all labs are normal

## 2022-09-26 ENCOUNTER — OFFICE VISIT (OUTPATIENT)
Dept: FAMILY MEDICINE | Facility: CLINIC | Age: 82
End: 2022-09-26
Payer: MEDICARE

## 2022-09-26 VITALS
OXYGEN SATURATION: 97 % | BODY MASS INDEX: 26.09 KG/M2 | RESPIRATION RATE: 18 BRPM | DIASTOLIC BLOOD PRESSURE: 78 MMHG | SYSTOLIC BLOOD PRESSURE: 120 MMHG | HEART RATE: 66 BPM | WEIGHT: 138.19 LBS | HEIGHT: 61 IN

## 2022-09-26 DIAGNOSIS — E03.9 HYPOTHYROIDISM, UNSPECIFIED TYPE: ICD-10-CM

## 2022-09-26 DIAGNOSIS — E04.2 NONTOXIC MULTINODULAR GOITER: ICD-10-CM

## 2022-09-26 DIAGNOSIS — R42 DIZZINESS AND GIDDINESS: Primary | ICD-10-CM

## 2022-09-26 DIAGNOSIS — E78.00 PURE HYPERCHOLESTEROLEMIA: ICD-10-CM

## 2022-09-26 DIAGNOSIS — Z86.010 HISTORY OF COLON POLYPS: ICD-10-CM

## 2022-09-26 DIAGNOSIS — I10 ESSENTIAL HYPERTENSION: ICD-10-CM

## 2022-09-26 PROBLEM — Z86.0100 HISTORY OF COLON POLYPS: Status: ACTIVE | Noted: 2022-09-26

## 2022-09-26 PROBLEM — R53.83 OTHER FATIGUE: Status: RESOLVED | Noted: 2022-08-22 | Resolved: 2022-09-26

## 2022-09-26 PROBLEM — H61.21 IMPACTED CERUMEN OF RIGHT EAR: Status: RESOLVED | Noted: 2022-04-07 | Resolved: 2022-09-26

## 2022-09-26 PROCEDURE — 3074F SYST BP LT 130 MM HG: CPT | Mod: CPTII,S$GLB,, | Performed by: FAMILY MEDICINE

## 2022-09-26 PROCEDURE — 1101F PT FALLS ASSESS-DOCD LE1/YR: CPT | Mod: CPTII,S$GLB,, | Performed by: FAMILY MEDICINE

## 2022-09-26 PROCEDURE — 3288F FALL RISK ASSESSMENT DOCD: CPT | Mod: CPTII,S$GLB,, | Performed by: FAMILY MEDICINE

## 2022-09-26 PROCEDURE — 99999 PR PBB SHADOW E&M-EST. PATIENT-LVL IV: ICD-10-PCS | Mod: PBBFAC,,, | Performed by: FAMILY MEDICINE

## 2022-09-26 PROCEDURE — 3288F PR FALLS RISK ASSESSMENT DOCUMENTED: ICD-10-PCS | Mod: CPTII,S$GLB,, | Performed by: FAMILY MEDICINE

## 2022-09-26 PROCEDURE — 1159F MED LIST DOCD IN RCRD: CPT | Mod: CPTII,S$GLB,, | Performed by: FAMILY MEDICINE

## 2022-09-26 PROCEDURE — 3078F PR MOST RECENT DIASTOLIC BLOOD PRESSURE < 80 MM HG: ICD-10-PCS | Mod: CPTII,S$GLB,, | Performed by: FAMILY MEDICINE

## 2022-09-26 PROCEDURE — 1126F AMNT PAIN NOTED NONE PRSNT: CPT | Mod: CPTII,S$GLB,, | Performed by: FAMILY MEDICINE

## 2022-09-26 PROCEDURE — 3074F PR MOST RECENT SYSTOLIC BLOOD PRESSURE < 130 MM HG: ICD-10-PCS | Mod: CPTII,S$GLB,, | Performed by: FAMILY MEDICINE

## 2022-09-26 PROCEDURE — 3078F DIAST BP <80 MM HG: CPT | Mod: CPTII,S$GLB,, | Performed by: FAMILY MEDICINE

## 2022-09-26 PROCEDURE — 99214 OFFICE O/P EST MOD 30 MIN: CPT | Mod: S$GLB,,, | Performed by: FAMILY MEDICINE

## 2022-09-26 PROCEDURE — 99999 PR PBB SHADOW E&M-EST. PATIENT-LVL IV: CPT | Mod: PBBFAC,,, | Performed by: FAMILY MEDICINE

## 2022-09-26 PROCEDURE — 1126F PR PAIN SEVERITY QUANTIFIED, NO PAIN PRESENT: ICD-10-PCS | Mod: CPTII,S$GLB,, | Performed by: FAMILY MEDICINE

## 2022-09-26 PROCEDURE — 1101F PR PT FALLS ASSESS DOC 0-1 FALLS W/OUT INJ PAST YR: ICD-10-PCS | Mod: CPTII,S$GLB,, | Performed by: FAMILY MEDICINE

## 2022-09-26 PROCEDURE — 1159F PR MEDICATION LIST DOCUMENTED IN MEDICAL RECORD: ICD-10-PCS | Mod: CPTII,S$GLB,, | Performed by: FAMILY MEDICINE

## 2022-09-26 PROCEDURE — 99214 PR OFFICE/OUTPT VISIT, EST, LEVL IV, 30-39 MIN: ICD-10-PCS | Mod: S$GLB,,, | Performed by: FAMILY MEDICINE

## 2022-09-26 RX ORDER — CHOLECALCIFEROL (VITAMIN D3) 25 MCG
1000 TABLET ORAL DAILY
COMMUNITY

## 2022-09-26 RX ORDER — LEVOTHYROXINE SODIUM 50 UG/1
50 TABLET ORAL
Qty: 90 TABLET | Refills: 3 | Status: CANCELLED | OUTPATIENT
Start: 2022-09-26 | End: 2023-09-26

## 2022-09-26 RX ORDER — LEVOTHYROXINE SODIUM 50 UG/1
50 TABLET ORAL
Qty: 90 TABLET | Refills: 3 | Status: SHIPPED | OUTPATIENT
Start: 2022-09-26 | End: 2022-10-25

## 2022-09-26 NOTE — PROGRESS NOTES
FAMILY MEDICINE  [facility]    Reason for visit:   Chief Complaint   Patient presents with    Follow-up         HPI: Joanen Daniel is a 82 y.o. female  - with hyperlipidemia, atrophic vaginitis (on topical hormone replacement therapy), hypothyroidism, hypertension multinodular goiter, hypertension, osteoarthritis, urethral carbuncle and left foot drop with bilateral LE neuropathy (reports was evaluated by Neurology when initially presented and diagnosed with hereditary neuropathy 2015) presents for follow-up    Urology: MARIYA Berumen NP   Endocrinology: Dr. Lesly Cook    Today she presents with her .  Reports that she has been doing well since her last visit.  The dizziness and gait instability have resolved since her visit with me.  She has not had any issues with her blood pressure has been monitoring regularly.  She also has not had any recurrent headaches.    Her thyroid levels have been well controlled.  She is currently on Synthroid 50 mcg daily however due to cost she would like to start levothyroxine 50 mcg generic.        Review of Systems   All other systems reviewed and are negative.    ALLERGIES:   Review of patient's allergies indicates:   Allergen Reactions    Fosamax [alendronate] Other (See Comments)     Patient unsure but reports that she was told not to take medication       MEDS:     Current Outpatient Medications:     estradioL (ESTRACE) 0.01 % (0.1 mg/gram) vaginal cream, Place 1 g vaginally twice a week., Disp: 8 g, Rfl: 11    magnesium oxide 200 mg magnesium Tab, Take 1 tablet by mouth., Disp: , Rfl:     thiamine 100 MG tablet, Take 100 mg by mouth once daily., Disp: , Rfl:     vitamin D (VITAMIN D3) 1000 units Tab, Take 1,000 Units by mouth once daily., Disp: , Rfl:     calcium-mag oxide-vitamin D3 185- mg-mg-unit Cap, Take by mouth 3 (three) times daily., Disp: , Rfl:     levothyroxine (SYNTHROID) 50 MCG tablet, Take 1 tablet (50  "mcg total) by mouth before breakfast., Disp: 90 tablet, Rfl: 3    Vital signs:   Vitals:    09/26/22 1031   BP: 120/78   BP Location: Left arm   Patient Position: Sitting   BP Method: Large (Manual)   Pulse: 66   Resp: 18   SpO2: 97%   Weight: 62.7 kg (138 lb 3.2 oz)   Height: 5' 1" (1.549 m)       Body mass index is 26.11 kg/m².    PHYSICAL EXAM:     Physical Exam  Constitutional:       General: She is not in acute distress.  Cardiovascular:      Rate and Rhythm: Normal rate and regular rhythm.      Pulses: Normal pulses.      Heart sounds: Normal heart sounds. No murmur heard.    No friction rub. No gallop.   Pulmonary:      Effort: Pulmonary effort is normal.      Breath sounds: Normal breath sounds. No wheezing, rhonchi or rales.   Musculoskeletal:      Cervical back: Neck supple.      Right lower leg: No edema.      Left lower leg: No edema.   Neurological:      Mental Status: She is alert.         PHQ4 = PHQ-4 Score: 0    PERTINENT RESULTS:   BMP  Lab Results   Component Value Date     08/21/2022    K 4.3 08/21/2022     08/21/2022    CO2 29 08/21/2022    BUN 18 (H) 08/21/2022    CREATININE 0.77 08/21/2022    CALCIUM 9.2 08/21/2022    ANIONGAP 8 08/21/2022    ESTGFRAFRICA >60.0 03/04/2022    EGFRNONAA >60.0 03/04/2022     Lab Results   Component Value Date    ALT 19 03/04/2022    AST 26 03/04/2022    ALKPHOS 98 03/04/2022    BILITOT 0.5 03/04/2022     Lab Results   Component Value Date    CHOL 240 (H) 03/04/2022    CHOL 242 (H) 07/06/2021    CHOL 220 (H) 12/30/2020     Lab Results   Component Value Date    HDL 74 03/04/2022    HDL 78 (H) 07/06/2021    HDL 71 12/30/2020     Lab Results   Component Value Date    LDLCALC 150.2 03/04/2022    LDLCALC 147.6 07/06/2021    LDLCALC 132.6 12/30/2020     Lab Results   Component Value Date    TRIG 79 03/04/2022    TRIG 82 07/06/2021    TRIG 82 12/30/2020     Lab Results   Component Value Date    CHOLHDL 30.8 03/04/2022    CHOLHDL 32.2 07/06/2021    CHOLHDL 32.3 " 12/30/2020     Lab Results   Component Value Date    TSH 1.100 08/22/2022    [id number] 8.5 12/30/2020    FREET4 0.95 08/22/2022       MRI Brain Without Contrast  Narrative: EXAMINATION:  MRI BRAIN WITHOUT CONTRAST    CLINICAL HISTORY:  Dizziness, persistent/recurrent, cardiac or vascular cause suspected;+romberg; Dizziness and giddiness    TECHNIQUE:  Multiplanar multisequence MR imaging of the brain was performed without contrast.    FINDINGS:  The brain is significant for periventricular and subcortical T2/FLAIR signal hyperintensity consistent with microvascular ischemic change.  The diffusion-weighted images are negative and there is no evidence of acute or subacute infarct.  There is no intra or extra-axial mass or hemorrhage.  There is bilateral ethmoid mucous membrane thickening.  Impression: No acute intracranial abnormality.    Electronically signed by: Anthony Bowles MD  Date:    08/22/2022  Time:    13:32      Results for orders placed or performed during the hospital encounter of 01/17/22   EKG 12-lead    Collection Time: 01/17/22 10:22 AM    Narrative    Test Reason : R55,    Vent. Rate : 072 BPM     Atrial Rate : 072 BPM     P-R Int : 136 ms          QRS Dur : 078 ms      QT Int : 410 ms       P-R-T Axes : 063 -21 020 degrees     QTc Int : 448 ms    Normal sinus rhythm  Cannot rule out Anterior infarct ,age undetermined  Abnormal ECG  No previous ECGs available  Confirmed by Andrea BROWN MD, Artemio RAMIREZ ([id number]) on 1/18/2022 9:36:17 AM    Referred By: NESTOR EMERY           Confirmed By:Artemio Mendez III, MD                   ASSESSMENT/PLAN:  Problem List Items Addressed This Visit          Cardiac/Vascular    RESOLVED: Essential hypertension    Overview     - still well controlled without medication  - resolved         Pure hypercholesterolemia    Overview     Lab Results   Component Value Date    LDLCALC 150.2 03/04/2022   - unable tolerate rosuvastatin 5 mg daily  - recommend goal LDL <140  - discussed  recommendation for diet and cardiovascular exercise  - counseling on lifestyle modifications for risk factor reduction  - have discussed trying pravastatin 10 mg nightly with CoQ10 100 mg daily and uptitrate as tolerated  - patient declined            Endocrine    Hypothyroidism    Overview       Lab Results   Component Value Date    TSH 1.100 08/22/2022    K0GBRQU 8.5 12/30/2020    FREET4 0.95 08/22/2022     - well controlled  - okay to change to generic levothyroxine 50 mcg daily         Relevant Medications    levothyroxine (SYNTHROID) 50 MCG tablet    Nontoxic multinodular goiter    Overview     - 10/8/2018 Thyroid US: Two small nodules within the right lobe which do not meet the size criteria for fine needle aspiration.  - 06/17/2020 thyroid ultrasound: There is a 0.3 cm hypoechoic nodule within the right lobe.  The left lobe has a homogeneous echotexture.  There is a nonenlarged cervical lymph node on the left   - stable to improved         Relevant Medications    levothyroxine (SYNTHROID) 50 MCG tablet       GI    History of colon polyps    Overview     - 8/9/2019 colonoscopy 12 mm polyp removed  - GI recommended repeat colonoscopy 3 years  - > 75 years old and rec discuss with Gastroenterology, Dr. Leahy         Relevant Orders    Ambulatory referral/consult to Gastroenterology       Other    Dizziness and giddiness - Primary    Overview     - 8/22/22 MRI brain: ischemic microvascular changes otherwise normal  - resolved            ORDERS:   Orders Placed This Encounter    Ambulatory referral/consult to Gastroenterology    levothyroxine (SYNTHROID) 50 MCG tablet         Vaccines recommended:  COVID-19, shingles, Prevnar 20.  Patient declined all vaccines    Today I discussed that I will no longer be practicing at [facility] effective 1/2023. My new location will be at [facility]. We assisted Joanne Daniel in establishing with a new provider for follow-up. I encouraged Joanne Daniel to  notify me with an questions or concerns prior to my departure.     Follow-up in 6 months or sooner if any concerns.    This note is dictated using the M*Modal Fluency Direct word recognition program. There are word recognition mistakes that are occasionally missed on review.    Dr. Radha Cherry D.O.   [facility]

## 2022-10-04 ENCOUNTER — TELEPHONE (OUTPATIENT)
Dept: FAMILY MEDICINE | Facility: CLINIC | Age: 82
End: 2022-10-04
Payer: MEDICARE

## 2022-10-04 NOTE — TELEPHONE ENCOUNTER
----- Message from Triston De La Cruz sent at 10/4/2022  1:05 PM CDT -----  Regarding: rx  Type:  Patient Returning Call    Who Called:patient     Who Left Message for Patient:n/a    Does the patient know what this is regarding?:patient would like a rx called in for coughing, itchy throat and runny nose      Would the patient rather a call back or a response via [facility]? Call back   Best Call Back Number:[phone]  Additional Information: n/a

## 2022-10-17 ENCOUNTER — OFFICE VISIT (OUTPATIENT)
Dept: UROLOGY | Facility: CLINIC | Age: 82
End: 2022-10-17
Payer: MEDICARE

## 2022-10-17 VITALS
BODY MASS INDEX: 25.9 KG/M2 | HEIGHT: 61 IN | HEART RATE: 72 BPM | WEIGHT: 137.19 LBS | SYSTOLIC BLOOD PRESSURE: 118 MMHG | DIASTOLIC BLOOD PRESSURE: 61 MMHG

## 2022-10-17 DIAGNOSIS — R35.1 NOCTURIA: ICD-10-CM

## 2022-10-17 DIAGNOSIS — N95.2 ATROPHIC VAGINITIS: ICD-10-CM

## 2022-10-17 DIAGNOSIS — N36.2 URETHRAL CARUNCLE: Primary | ICD-10-CM

## 2022-10-17 DIAGNOSIS — N39.3 SUI (STRESS URINARY INCONTINENCE, FEMALE): ICD-10-CM

## 2022-10-17 PROCEDURE — 1126F PR PAIN SEVERITY QUANTIFIED, NO PAIN PRESENT: ICD-10-PCS | Mod: CPTII,S$GLB,, | Performed by: NURSE PRACTITIONER

## 2022-10-17 PROCEDURE — 3078F PR MOST RECENT DIASTOLIC BLOOD PRESSURE < 80 MM HG: ICD-10-PCS | Mod: CPTII,S$GLB,, | Performed by: NURSE PRACTITIONER

## 2022-10-17 PROCEDURE — 3074F SYST BP LT 130 MM HG: CPT | Mod: CPTII,S$GLB,, | Performed by: NURSE PRACTITIONER

## 2022-10-17 PROCEDURE — 3074F PR MOST RECENT SYSTOLIC BLOOD PRESSURE < 130 MM HG: ICD-10-PCS | Mod: CPTII,S$GLB,, | Performed by: NURSE PRACTITIONER

## 2022-10-17 PROCEDURE — 1159F PR MEDICATION LIST DOCUMENTED IN MEDICAL RECORD: ICD-10-PCS | Mod: CPTII,S$GLB,, | Performed by: NURSE PRACTITIONER

## 2022-10-17 PROCEDURE — 99214 PR OFFICE/OUTPT VISIT, EST, LEVL IV, 30-39 MIN: ICD-10-PCS | Mod: S$GLB,,, | Performed by: NURSE PRACTITIONER

## 2022-10-17 PROCEDURE — 99999 PR PBB SHADOW E&M-EST. PATIENT-LVL III: ICD-10-PCS | Mod: PBBFAC,,, | Performed by: NURSE PRACTITIONER

## 2022-10-17 PROCEDURE — 99999 PR PBB SHADOW E&M-EST. PATIENT-LVL III: CPT | Mod: PBBFAC,,, | Performed by: NURSE PRACTITIONER

## 2022-10-17 PROCEDURE — 1126F AMNT PAIN NOTED NONE PRSNT: CPT | Mod: CPTII,S$GLB,, | Performed by: NURSE PRACTITIONER

## 2022-10-17 PROCEDURE — 3078F DIAST BP <80 MM HG: CPT | Mod: CPTII,S$GLB,, | Performed by: NURSE PRACTITIONER

## 2022-10-17 PROCEDURE — 1159F MED LIST DOCD IN RCRD: CPT | Mod: CPTII,S$GLB,, | Performed by: NURSE PRACTITIONER

## 2022-10-17 PROCEDURE — 87086 URINE CULTURE/COLONY COUNT: CPT | Performed by: NURSE PRACTITIONER

## 2022-10-17 PROCEDURE — 99214 OFFICE O/P EST MOD 30 MIN: CPT | Mod: S$GLB,,, | Performed by: NURSE PRACTITIONER

## 2022-10-17 RX ORDER — ESTRADIOL 0.1 MG/G
1 CREAM VAGINAL
Qty: 8 G | Refills: 11 | Status: SHIPPED | OUTPATIENT
Start: 2022-10-17 | End: 2024-01-24

## 2022-10-17 NOTE — PATIENT INSTRUCTIONS
Urine cx  Continue using estrace cream as prescribed. REFILLED  Follow-up in 1 year or sooner if needed.

## 2022-10-17 NOTE — PROGRESS NOTES
Subjective:       Patient ID: Joanne Daniel is a 82 y.o. female.    Chief Complaint: Annual Exam    Patient is a 83 yo WF who is here today for her annual urology exam and evaluation of her urethral caruncle. She reports everything is going well and has no complaints at this time. She is currently using estrace vaginal cream.     Follow-up  This is a chronic (urethral caruncle) problem. The current episode started more than 1 year ago. The problem occurs daily. The problem has been unchanged. Associated symptoms include arthralgias. Pertinent negatives include no abdominal pain, anorexia, change in bowel habit, chills, fatigue, fever, headaches, nausea, swollen glands, urinary symptoms, vomiting or weakness. Nothing aggravates the symptoms. Treatments tried: estrace vaginal cream. The treatment provided significant relief.    Review of Systems   Constitutional:  Negative for appetite change, chills, fatigue and fever.   Gastrointestinal:  Negative for abdominal pain, change in bowel habit, constipation, diarrhea, nausea, vomiting and change in bowel habit.   Genitourinary:  Positive for bladder incontinence (with sneezing) and nocturia (x1). Negative for decreased urine volume, difficulty urinating, dysuria, flank pain, frequency, hematuria, pelvic pain, urgency, vaginal bleeding, vaginal discharge and vaginal pain.   Neurological:  Negative for dizziness, weakness and headaches.   Psychiatric/Behavioral: Negative.         Objective:      Physical Exam  Vitals and nursing note reviewed.   Constitutional:       General: She is not in acute distress.     Appearance: She is well-developed. She is not ill-appearing.   HENT:      Head: Normocephalic and atraumatic.   Eyes:      Pupils: Pupils are equal, round, and reactive to light.   Cardiovascular:      Rate and Rhythm: Normal rate.   Pulmonary:      Effort: Pulmonary effort is normal. No respiratory distress.   Abdominal:      Palpations: Abdomen is soft.       Tenderness: There is no abdominal tenderness.   Genitourinary:      Musculoskeletal:         General: Normal range of motion.      Cervical back: Normal range of motion.   Skin:     General: Skin is warm and dry.   Neurological:      Mental Status: She is alert and oriented to person, place, and time.      Coordination: Coordination normal.   Psychiatric:         Mood and Affect: Mood normal.         Behavior: Behavior normal.         Thought Content: Thought content normal.         Judgment: Judgment normal.       Assessment:       Problem List Items Addressed This Visit          Renal/    Urethral caruncle - Primary    Relevant Orders    Urine culture    Atrophic vaginitis    Relevant Medications    estradioL (ESTRACE) 0.01 % (0.1 mg/gram) vaginal cream    Other Relevant Orders    Urine culture     Other Visit Diagnoses       Nocturia        Relevant Orders    Urine culture    MARIO (stress urinary incontinence, female)        Relevant Orders    Urine culture            Plan:           Joanne was seen today for annual exam.    Diagnoses and all orders for this visit:    Urethral caruncle  -     Urine culture    Atrophic vaginitis  -     estradioL (ESTRACE) 0.01 % (0.1 mg/gram) vaginal cream; Place 1 g vaginally twice a week.  -     Urine culture    Nocturia  -     Urine culture    MAIRO (stress urinary incontinence, female)  -     Urine culture    Follow-up in 1 year or sooner if needed.     Anabel Berumen NP

## 2022-10-19 LAB
BACTERIA UR CULT: NORMAL
BACTERIA UR CULT: NORMAL

## 2022-10-20 ENCOUNTER — PATIENT MESSAGE (OUTPATIENT)
Dept: UROLOGY | Facility: CLINIC | Age: 82
End: 2022-10-20
Payer: MEDICARE

## 2022-10-25 ENCOUNTER — TELEPHONE (OUTPATIENT)
Dept: PRIMARY CARE CLINIC | Facility: CLINIC | Age: 82
End: 2022-10-25
Payer: MEDICARE

## 2022-10-25 DIAGNOSIS — E03.9 HYPOTHYROIDISM, UNSPECIFIED TYPE: Primary | ICD-10-CM

## 2022-10-25 RX ORDER — LEVOTHYROXINE SODIUM 50 UG/1
50 TABLET ORAL
Qty: 90 TABLET | Refills: 3 | Status: SHIPPED | OUTPATIENT
Start: 2022-10-25 | End: 2023-10-06 | Stop reason: SDUPTHER

## 2022-10-25 NOTE — TELEPHONE ENCOUNTER
"Pt states that she sharp snot want to take levothyroxine 50 mcg and does not want to change her old med to levothyroxine. Pt states that she would like to take "Levoxyl 50 mg" which she was on with Dr. Cook office. Pt would like a new script sent in of the "Levoxyl". Pt did not  the levothyroxine from the pharmacy.   "

## 2022-10-25 NOTE — TELEPHONE ENCOUNTER
----- Message from Murray Menard sent at 10/25/2022  1:53 PM CDT -----  Contact: Patient  The pt called and would like to have someone call her back    This is regarding a medication for her thyroid    The pt can be reached at [phone]

## 2022-12-20 ENCOUNTER — TELEPHONE (OUTPATIENT)
Dept: FAMILY MEDICINE | Facility: CLINIC | Age: 82
End: 2022-12-20
Payer: MEDICARE

## 2022-12-20 ENCOUNTER — OFFICE VISIT (OUTPATIENT)
Dept: FAMILY MEDICINE | Facility: CLINIC | Age: 82
End: 2022-12-20
Payer: MEDICARE

## 2022-12-20 VITALS
BODY MASS INDEX: 26.19 KG/M2 | DIASTOLIC BLOOD PRESSURE: 64 MMHG | WEIGHT: 138.69 LBS | HEIGHT: 61 IN | SYSTOLIC BLOOD PRESSURE: 110 MMHG | TEMPERATURE: 99 F | HEART RATE: 71 BPM | OXYGEN SATURATION: 96 %

## 2022-12-20 DIAGNOSIS — A08.4 VIRAL GASTROENTERITIS: Primary | ICD-10-CM

## 2022-12-20 DIAGNOSIS — R11.0 NAUSEA: ICD-10-CM

## 2022-12-20 DIAGNOSIS — R19.7 DIARRHEA, UNSPECIFIED TYPE: ICD-10-CM

## 2022-12-20 LAB
CTP QC/QA: YES
SARS-COV-2 RDRP RESP QL NAA+PROBE: NEGATIVE

## 2022-12-20 PROCEDURE — 1101F PR PT FALLS ASSESS DOC 0-1 FALLS W/OUT INJ PAST YR: ICD-10-PCS | Mod: HCNC,CPTII,S$GLB,

## 2022-12-20 PROCEDURE — 1159F PR MEDICATION LIST DOCUMENTED IN MEDICAL RECORD: ICD-10-PCS | Mod: HCNC,CPTII,S$GLB,

## 2022-12-20 PROCEDURE — 99999 PR PBB SHADOW E&M-EST. PATIENT-LVL IV: CPT | Mod: PBBFAC,HCNC,,

## 2022-12-20 PROCEDURE — 3078F DIAST BP <80 MM HG: CPT | Mod: HCNC,CPTII,S$GLB,

## 2022-12-20 PROCEDURE — 99999 PR PBB SHADOW E&M-EST. PATIENT-LVL IV: ICD-10-PCS | Mod: PBBFAC,HCNC,,

## 2022-12-20 PROCEDURE — 99214 PR OFFICE/OUTPT VISIT, EST, LEVL IV, 30-39 MIN: ICD-10-PCS | Mod: HCNC,S$GLB,,

## 2022-12-20 PROCEDURE — 1126F PR PAIN SEVERITY QUANTIFIED, NO PAIN PRESENT: ICD-10-PCS | Mod: HCNC,CPTII,S$GLB,

## 2022-12-20 PROCEDURE — 99214 OFFICE O/P EST MOD 30 MIN: CPT | Mod: HCNC,S$GLB,,

## 2022-12-20 PROCEDURE — 3288F FALL RISK ASSESSMENT DOCD: CPT | Mod: HCNC,CPTII,S$GLB,

## 2022-12-20 PROCEDURE — 3074F SYST BP LT 130 MM HG: CPT | Mod: HCNC,CPTII,S$GLB,

## 2022-12-20 PROCEDURE — 1126F AMNT PAIN NOTED NONE PRSNT: CPT | Mod: HCNC,CPTII,S$GLB,

## 2022-12-20 PROCEDURE — 1160F PR REVIEW ALL MEDS BY PRESCRIBER/CLIN PHARMACIST DOCUMENTED: ICD-10-PCS | Mod: HCNC,CPTII,S$GLB,

## 2022-12-20 PROCEDURE — 87635: ICD-10-PCS | Mod: QW,HCNC,S$GLB,

## 2022-12-20 PROCEDURE — 1160F RVW MEDS BY RX/DR IN RCRD: CPT | Mod: HCNC,CPTII,S$GLB,

## 2022-12-20 PROCEDURE — 3288F PR FALLS RISK ASSESSMENT DOCUMENTED: ICD-10-PCS | Mod: HCNC,CPTII,S$GLB,

## 2022-12-20 PROCEDURE — 1101F PT FALLS ASSESS-DOCD LE1/YR: CPT | Mod: HCNC,CPTII,S$GLB,

## 2022-12-20 PROCEDURE — 3074F PR MOST RECENT SYSTOLIC BLOOD PRESSURE < 130 MM HG: ICD-10-PCS | Mod: HCNC,CPTII,S$GLB,

## 2022-12-20 PROCEDURE — 1159F MED LIST DOCD IN RCRD: CPT | Mod: HCNC,CPTII,S$GLB,

## 2022-12-20 PROCEDURE — 87635 SARS-COV-2 COVID-19 AMP PRB: CPT | Mod: QW,HCNC,S$GLB,

## 2022-12-20 PROCEDURE — 3078F PR MOST RECENT DIASTOLIC BLOOD PRESSURE < 80 MM HG: ICD-10-PCS | Mod: HCNC,CPTII,S$GLB,

## 2022-12-20 RX ORDER — ONDANSETRON 4 MG/1
4 TABLET, FILM COATED ORAL EVERY 8 HOURS PRN
Qty: 15 TABLET | Refills: 0 | Status: SHIPPED | OUTPATIENT
Start: 2022-12-20 | End: 2022-12-25

## 2022-12-20 NOTE — TELEPHONE ENCOUNTER
Spoke with pt regarding symptoms.  Pt asked for medication to be sent to her pharm, but I explained to pt that she has to be assessed before and/or if any medication is needed.    I have scheduled pt with NP Kristyn Villar on 12/20/22 @ 130 pm.  Pt understood verbally and accepted. LM        ----- Message from Sean French sent at 12/20/2022  8:44 AM CST -----  Type:  Needs Medical Advice    Who Called: pt  Symptoms (please be specific): stomach virus  pt states that she is unable to hold any liquid down or food down ( dry toast) and states she went straight to the restroom afterwards    How long has patient had these symptoms:  1 day  Pharmacy name and phone #:  [id number] - [place], [place] - [address] [address]   Phone: [phone]  Fax: [phone]  Would the patient rather a call back or a response via [facility]? call  Best Call Back Number: [phone]  Additional Information:

## 2022-12-20 NOTE — PATIENT INSTRUCTIONS
Be sure to stay hydrated, drink at least 4-16 oz bottles of water per day.  Take zofran every 8 hours as needed for nausea.   Ok to eat regular foods, if unable to tolerated, try BRAT diet (bananas, rice, applesauce, and toast).    Viral gastroenteritis is an acute and self-limited disease that does not require pharmacologic therapy.     If symptoms worsen, go to ED.   Return to clinic as needed.

## 2022-12-20 NOTE — PROGRESS NOTES
Subjective:         Chief Complaint: Diarrhea (Stomach ache and watery diarrhea last night and today. No vomit. No fever. )     Joanne Daniel is a 82 y.o. female, patient of Radha Cherry DO with neuropathy, hypercholesterolemia, hypothyroidism, osteoarthritis,   unknown to me, presents today with her spouse with complaints of Diarrhea (Stomach ache and watery diarrhea last night and today. No vomit. No fever. )    Reports midabdominal discomfort, nausea. Denies vomiting. Denies UR symptoms, fever, headaches, chest pain, shortness of breath. Reports she is eating fine, but only ate a piece of toast this morning. Reports not drinking a lot of water as she was not able to control her bowels.     Was with a friend on Thursday who tested positive for COVID on Friday, they did not wear a mask.     Dinner yesterday was mashed potato and roast, did not eat anything out of the ordinary.       Diarrhea   This is a new problem. The current episode started yesterday (last night). The problem occurs 5 to 10 times per day (2 times yesterday, 3 times today so far). The problem has been gradually improving. The stool consistency is described as Watery (watery with specks of form poop). Pertinent negatives include no abdominal pain, arthralgias, bloating, chills, coughing, fever, headaches, increased  flatus, myalgias, sweats, URI or vomiting. Nothing aggravates the symptoms. Risk factors include ill contacts. She has tried nothing for the symptoms.     Past Medical History:   Diagnosis Date    Anemia of chronic disease 10/22/2018    Colon polyps     Foot drop, left foot     Glaucoma     Hyperlipidemia     Thrombocytosis 10/2/2018    Urinary tract infection     Vaginal infection        Past Surgical History:   Procedure Laterality Date    COLONOSCOPY  08/09/2019    Mild diverticulosis of the desending colon and sigmoid colon. Polyp 12 mm in the hepatic flexure    EYE SURGERY Right     POLYPECTOMY         Family History   Problem  "Relation Age of Onset    Heart disease Neg Hx     Cancer Neg Hx     Kidney disease Neg Hx     Breast cancer Neg Hx     Ovarian cancer Neg Hx     Colon cancer Neg Hx        Social History     Socioeconomic History    Marital status:      Spouse name: Antonino    Number of children: 3   Tobacco Use    Smoking status: Never    Smokeless tobacco: Never   Substance and Sexual Activity    Alcohol use: No     Alcohol/week: 0.0 standard drinks    Drug use: No    Sexual activity: Yes     Partners: Male   Social History Narrative    Lives with her  in [place]. 3 children and 5 grandchildren. Shinto. [employer]-again [employer]       Review of Systems   Constitutional:  Negative for appetite change, chills, fatigue and fever.   HENT:  Negative for congestion, nosebleeds, postnasal drip, rhinorrhea, sinus pressure, sinus pain, sneezing and sore throat.    Respiratory:  Negative for cough and shortness of breath.    Cardiovascular:  Negative for chest pain, palpitations and leg swelling.   Gastrointestinal:  Positive for diarrhea and nausea. Negative for abdominal pain, bloating, flatus and vomiting.   Genitourinary:  Negative for dysuria.   Musculoskeletal:  Negative for arthralgias and myalgias.   Skin:  Negative for color change.   Neurological:  Negative for dizziness, light-headedness and headaches.       Objective:     Vitals:    12/20/22 1332   BP: 110/64   Pulse: 71   SpO2: 96%   Weight: 62.9 kg (138 lb 11.2 oz)   Height: 5' 1" (1.549 m)          Physical Exam  Vitals reviewed.   Constitutional:       Appearance: Normal appearance. She is well-developed and well-groomed.   HENT:      Head: Normocephalic and atraumatic.      Right Ear: Tympanic membrane normal.      Left Ear: Tympanic membrane normal.      Nose: No congestion or rhinorrhea.      Right Turbinates: Not swollen.      Left Turbinates: Not swollen.      Right Sinus: No maxillary sinus tenderness or frontal sinus tenderness.      Left Sinus: No maxillary " sinus tenderness or frontal sinus tenderness.      Mouth/Throat:      Pharynx: Oropharynx is clear. No pharyngeal swelling, oropharyngeal exudate, posterior oropharyngeal erythema or uvula swelling.   Eyes:      General: No scleral icterus.     Extraocular Movements: Extraocular movements intact.   Cardiovascular:      Rate and Rhythm: Normal rate and regular rhythm.      Heart sounds: Normal heart sounds.   Pulmonary:      Effort: Pulmonary effort is normal.      Breath sounds: Normal breath sounds. No wheezing, rhonchi or rales.   Abdominal:      General: Bowel sounds are normal.      Palpations: Abdomen is soft.      Tenderness: There is no abdominal tenderness.   Musculoskeletal:         General: Normal range of motion.      Cervical back: Normal range of motion.      Right lower leg: No edema.      Left lower leg: No edema.   Skin:     General: Skin is warm and dry.      Capillary Refill: Capillary refill takes less than 2 seconds.   Neurological:      General: No focal deficit present.      Mental Status: She is alert and oriented to person, place, and time.   Psychiatric:         Attention and Perception: Attention normal.         Mood and Affect: Mood normal.         Speech: Speech normal.         Behavior: Behavior is cooperative.         Laboratory:  CBC:  No results for input(s): WBC, RBC, HGB, HCT, PLT, MCV, MCH, MCHC in the last 2160 hours.  CMP:  No results for input(s): GLU, CALCIUM, ALBUMIN, PROT, NA, K, CO2, CL, BUN, ALKPHOS, ALT, AST, BILITOT in the last 2160 hours.    Invalid input(s): CREATININ  URINALYSIS:  No results for input(s): COLORU, CLARITYU, SPECGRAV, PHUR, PROTEINUA, GLUCOSEU, BILIRUBINCON, BLOODU, WBCU, RBCU, BACTERIA, MUCUS, NITRITE, LEUKOCYTESUR, UROBILINOGEN, HYALINECASTS in the last 2160 hours.   LIPIDS:  No results for input(s): TSH, HDL, CHOL, TRIG, LDLCALC, CHOLHDL, NONHDLCHOL, TOTALCHOLEST in the last 2160 hours.  TSH:  No results for input(s): TSH in the last 2160  hours.  A1C:  No results for input(s): HGBA1C in the last 2160 hours.    Assessment:         ICD-10-CM ICD-9-CM   1. Viral gastroenteritis  A08.4 008.8   2. Diarrhea, unspecified type  R19.7 787.91   3. Nausea  R11.0 787.02       Plan:       POCT COVID negative.     Viral gastroenteritis  -     ondansetron (ZOFRAN) 4 MG tablet; Take 1 tablet (4 mg total) by mouth every 8 (eight) hours as needed for Nausea.  Dispense: 15 tablet; Refill: 0  Instructed to stay hydrated, drink at least 4-16 oz bottles of water per day, try to take some sips of fluids every 15-30 minutes.   Encouraged good handwashing.   Avoid sharing things with others.  Wipe from front to back using wipes to prevent UTI.   Take zofran every 8 hours as needed for nausea.   Ok to eat regular foods, if unable to tolerate, try BRAT diet (bananas, rice, applesauce, and toast).  Explained this is an acute and self-limited disease that does not require pharmacologic therapy.    Diarrhea, unspecified type  -     POCT COVID-19 Rapid Screening    Nausea  -     POCT COVID-19 Rapid Screening  -     ondansetron (ZOFRAN) 4 MG tablet; Take 1 tablet (4 mg total) by mouth every 8 (eight) hours as needed for Nausea.  Dispense: 15 tablet; Refill: 0    If symptoms worsen, go to ER.  If symptoms do not improve or persist beyond 2 weeks, return to clinic.   Keep appointments with all specialists.    Patient and spouse verbalizes understanding and agrees with current treatment plan.       Follow up if symptoms worsen or fail to improve.     Patient's Medications   New Prescriptions    ONDANSETRON (ZOFRAN) 4 MG TABLET    Take 1 tablet (4 mg total) by mouth every 8 (eight) hours as needed for Nausea.   Previous Medications    CALCIUM-MAG OXIDE-VITAMIN D3 185- MG-MG-UNIT CAP    Take by mouth 3 (three) times daily.    ESTRADIOL (ESTRACE) 0.01 % (0.1 MG/GRAM) VAGINAL CREAM    Place 1 g vaginally twice a week.    LEVOXYL 50 MCG TABLET    Take 1 tablet (50 mcg total) by mouth  before breakfast.    MAGNESIUM OXIDE 200 MG MAGNESIUM TAB    Take 1 tablet by mouth.    THIAMINE 100 MG TABLET    Take 100 mg by mouth once daily.    VITAMIN D (VITAMIN D3) 1000 UNITS TAB    Take 1,000 Units by mouth once daily.   Modified Medications    No medications on file   Discontinued Medications    No medications on file         Kristyn Villar NP

## 2023-02-07 DIAGNOSIS — Z00.00 ENCOUNTER FOR MEDICARE ANNUAL WELLNESS EXAM: ICD-10-CM

## 2023-02-09 DIAGNOSIS — Z00.00 ENCOUNTER FOR MEDICARE ANNUAL WELLNESS EXAM: ICD-10-CM

## 2023-03-15 ENCOUNTER — TELEPHONE (OUTPATIENT)
Dept: FAMILY MEDICINE | Facility: CLINIC | Age: 83
End: 2023-03-15
Payer: MEDICARE

## 2023-03-15 NOTE — TELEPHONE ENCOUNTER
Spoke with pt and informed her that when she sees Dr. Sanchez on 03/23/23 it will be determined if she will need blood work.  Pt understood catina.

## 2023-03-15 NOTE — TELEPHONE ENCOUNTER
----- Message from Sean French sent at 3/15/2023 10:31 AM CDT -----  Caller is requesting to schedule their Lab appointment prior to annual appointment.  Order is not listed in EPIC.  Please enter order and contact patient to schedule.    Name of Caller:pt    Preferred Date and Time of Labs:ASAP    Date of EPP Appointment:03/23/2023    [facility] they [facility] the [facility]    Would the patient rather a call back or a response via [facility]? call    Best Call Back Number:[phone]    Additional Information:

## 2023-03-15 NOTE — TELEPHONE ENCOUNTER
Joanne Daniel has appt with Dr. Lino to establish care. And will need to check with her staff if she would like labs prior to visit. I will forward to Dr. Lino's staff    Dr. Radha Cherry D.O.   [facility]

## 2023-03-16 ENCOUNTER — PES CALL (OUTPATIENT)
Dept: ADMINISTRATIVE | Facility: CLINIC | Age: 83
End: 2023-03-16
Payer: MEDICARE

## 2023-03-20 ENCOUNTER — OFFICE VISIT (OUTPATIENT)
Dept: URGENT CARE | Facility: CLINIC | Age: 83
End: 2023-03-20
Payer: MEDICARE

## 2023-03-20 ENCOUNTER — TELEPHONE (OUTPATIENT)
Dept: UROLOGY | Facility: CLINIC | Age: 83
End: 2023-03-20
Payer: MEDICARE

## 2023-03-20 VITALS
OXYGEN SATURATION: 97 % | HEIGHT: 61 IN | HEART RATE: 74 BPM | TEMPERATURE: 98 F | RESPIRATION RATE: 20 BRPM | SYSTOLIC BLOOD PRESSURE: 167 MMHG | DIASTOLIC BLOOD PRESSURE: 68 MMHG | WEIGHT: 139 LBS | BODY MASS INDEX: 26.24 KG/M2

## 2023-03-20 DIAGNOSIS — R09.82 POST-NASAL DRIP: ICD-10-CM

## 2023-03-20 DIAGNOSIS — N30.01 ACUTE CYSTITIS WITH HEMATURIA: Primary | ICD-10-CM

## 2023-03-20 DIAGNOSIS — R30.0 DYSURIA: ICD-10-CM

## 2023-03-20 DIAGNOSIS — R09.81 NASAL CONGESTION: ICD-10-CM

## 2023-03-20 DIAGNOSIS — R05.9 COUGH, UNSPECIFIED TYPE: ICD-10-CM

## 2023-03-20 DIAGNOSIS — J06.9 VIRAL URI: ICD-10-CM

## 2023-03-20 DIAGNOSIS — H61.23 BILATERAL IMPACTED CERUMEN: ICD-10-CM

## 2023-03-20 DIAGNOSIS — J31.0 RHINITIS, UNSPECIFIED TYPE: ICD-10-CM

## 2023-03-20 LAB
BILIRUB UR QL STRIP: NEGATIVE
CTP QC/QA: YES
GLUCOSE UR QL STRIP: NEGATIVE
KETONES UR QL STRIP: NEGATIVE
LEUKOCYTE ESTERASE UR QL STRIP: POSITIVE
PH, POC UA: 5.5 (ref 5–8)
POC BLOOD, URINE: POSITIVE
POC NITRATES, URINE: NEGATIVE
PROT UR QL STRIP: POSITIVE
SARS-COV-2 AG RESP QL IA.RAPID: NEGATIVE
SP GR UR STRIP: 1.01 (ref 1–1.03)
UROBILINOGEN UR STRIP-ACNC: NORMAL (ref 0.1–1.1)

## 2023-03-20 PROCEDURE — 81003 URINALYSIS AUTO W/O SCOPE: CPT | Mod: QW,S$GLB,, | Performed by: NURSE PRACTITIONER

## 2023-03-20 PROCEDURE — 69209 EAR CERUMEN REMOVAL: ICD-10-PCS | Mod: 50,S$GLB,, | Performed by: NURSE PRACTITIONER

## 2023-03-20 PROCEDURE — 81003 POCT URINALYSIS, DIPSTICK, AUTOMATED, W/O SCOPE: ICD-10-PCS | Mod: QW,S$GLB,, | Performed by: NURSE PRACTITIONER

## 2023-03-20 PROCEDURE — 87811 SARS CORONAVIRUS 2 ANTIGEN POCT, MANUAL READ: ICD-10-PCS | Mod: QW,S$GLB,, | Performed by: NURSE PRACTITIONER

## 2023-03-20 PROCEDURE — 99214 OFFICE O/P EST MOD 30 MIN: CPT | Mod: 25,S$GLB,, | Performed by: NURSE PRACTITIONER

## 2023-03-20 PROCEDURE — 87811 SARS-COV-2 COVID19 W/OPTIC: CPT | Mod: QW,S$GLB,, | Performed by: NURSE PRACTITIONER

## 2023-03-20 PROCEDURE — 69209 REMOVE IMPACTED EAR WAX UNI: CPT | Mod: 50,S$GLB,, | Performed by: NURSE PRACTITIONER

## 2023-03-20 PROCEDURE — 99214 PR OFFICE/OUTPT VISIT, EST, LEVL IV, 30-39 MIN: ICD-10-PCS | Mod: 25,S$GLB,, | Performed by: NURSE PRACTITIONER

## 2023-03-20 RX ORDER — FLUTICASONE PROPIONATE 50 MCG
2 SPRAY, SUSPENSION (ML) NASAL DAILY PRN
Qty: 15.8 ML | Refills: 0 | Status: SHIPPED | OUTPATIENT
Start: 2023-03-20 | End: 2023-04-06

## 2023-03-20 RX ORDER — NITROFURANTOIN 25; 75 MG/1; MG/1
100 CAPSULE ORAL 2 TIMES DAILY
Qty: 10 CAPSULE | Refills: 0 | Status: SHIPPED | OUTPATIENT
Start: 2023-03-20 | End: 2023-04-06 | Stop reason: ALTCHOICE

## 2023-03-20 RX ORDER — LORATADINE 10 MG/1
10 TABLET ORAL DAILY PRN
Qty: 30 TABLET | Refills: 0 | Status: SHIPPED | OUTPATIENT
Start: 2023-03-20 | End: 2023-03-23

## 2023-03-20 NOTE — PROGRESS NOTES
"Subjective:       Patient ID: Joanne Daniel is a 82 y.o. female.    Vitals:  height is 5' 1" (1.549 m) and weight is 63 kg (139 lb). Her oral temperature is 98.1 °F (36.7 °C). Her blood pressure is 167/68 (abnormal) and her pulse is 74. Her respiration is 20 and oxygen saturation is 97%.     Chief Complaint: Sinus Problem and Dysuria (/)    Pt complains of nasal congestion, cough, and sneezing that started about 1 month ago and has been on and off but got worse today. She also complains of dysuria that started 4 days ago.         82-year-old female presents to clinic with complaints of nasal congestion, cough, sneezing x 1 month and painful urination x 4 days.  No history of covid 19 vaccines     Sinus Problem  This is a new problem. The current episode started today. The problem is unchanged. There has been no fever. Her pain is at a severity of 0/10. She is experiencing no pain. Associated symptoms include congestion, coughing, sinus pressure and sneezing. Pertinent negatives include no chills, diaphoresis, headaches or sore throat. Past treatments include nothing.   Dysuria   This is a new problem. Episode onset: 4 days ago. The problem occurs every urination. The problem has been waxing and waning. The pain is at a severity of 4/10. The pain is moderate. There has been no fever. Associated symptoms include frequency and urgency. Pertinent negatives include no chills or flank pain. She has tried increased fluids for the symptoms. The treatment provided mild relief.     Constitution: Negative for chills, sweating, fatigue and fever.   HENT:  Positive for congestion, postnasal drip and sinus pressure. Negative for sinus pain and sore throat.    Respiratory:  Positive for cough. Negative for sputum production.    Genitourinary:  Positive for dysuria, frequency and urgency. Negative for flank pain.   Musculoskeletal:  Negative for muscle cramps and muscle ache.   Allergic/Immunologic: Positive for sneezing. "   Neurological:  Negative for headaches.     Objective:      Physical Exam   Constitutional: She is oriented to person, place, and time. She appears well-developed.   HENT:   Head: Normocephalic and atraumatic.   Ears:   Right Ear: External ear normal. There is cerumen present.   Left Ear: External ear normal. There is cerumen present.   Nose: Mucosal edema and rhinorrhea present. No nasal deformity. No epistaxis. Right sinus exhibits maxillary sinus tenderness. Left sinus exhibits maxillary sinus tenderness.   Mouth/Throat: Oropharynx is clear and moist and mucous membranes are normal.   Eyes: Lids are normal.   Neck: Trachea normal and phonation normal. Neck supple.   Cardiovascular: Normal pulses.   Pulmonary/Chest: Effort normal.   Abdominal: Normal appearance and bowel sounds are normal. She exhibits no distension. Soft. There is no abdominal tenderness.   Neurological: She is alert and oriented to person, place, and time.   Skin: Skin is warm, dry and intact.   Psychiatric: Her speech is normal and behavior is normal.   Nursing note and vitals reviewed.      Assessment:       1. Acute cystitis with hematuria    2. Viral URI    3. Dysuria    4. Cough, unspecified type    5. Rhinitis, unspecified type    6. Nasal congestion    7. Post-nasal drip    8. Bilateral impacted cerumen        Results for orders placed or performed in visit on 03/20/23   SARS Coronavirus 2 Antigen, POCT Manual Read   Result Value Ref Range    SARS Coronavirus 2 Antigen Negative Negative     Acceptable Yes    POCT Urinalysis, Dipstick, Automated, W/O Scope   Result Value Ref Range    POC Blood, Urine Positive (A) Negative    POC Bilirubin, Urine Negative Negative    POC Urobilinogen, Urine Normal 0.1 - 1.1    POC Ketones, Urine Negative Negative    POC Protein, Urine Positive (A) Negative    POC Nitrates, Urine Negative Negative    POC Glucose, Urine Negative Negative    pH, UA 5.5 5 - 8    POC Specific Gravity, Urine 1.015  "1.003 - 1.029    POC Leukocytes, Urine Positive (A) Negative      Plan:         Acute cystitis with hematuria  -     nitrofurantoin, macrocrystal-monohydrate, (MACROBID) 100 MG capsule; Take 1 capsule (100 mg total) by mouth 2 (two) times daily. for 5 days  Dispense: 10 capsule; Refill: 0    Viral URI    Dysuria  -     POCT Urinalysis, Dipstick, Automated, W/O Scope    Cough, unspecified type  -     SARS Coronavirus 2 Antigen, POCT Manual Read    Rhinitis, unspecified type  -     loratadine (CLARITIN) 10 mg tablet; Take 1 tablet (10 mg total) by mouth daily as needed for Allergies.  Dispense: 30 tablet; Refill: 0  -     fluticasone propionate (FLONASE) 50 mcg/actuation nasal spray; 2 sprays (100 mcg total) by Each Nostril route daily as needed for Rhinitis.  Dispense: 15.8 mL; Refill: 0    Nasal congestion  -     fluticasone propionate (FLONASE) 50 mcg/actuation nasal spray; 2 sprays (100 mcg total) by Each Nostril route daily as needed for Rhinitis.  Dispense: 15.8 mL; Refill: 0    Post-nasal drip    Bilateral impacted cerumen  -     Ear Cerumen Removal    Cerumen Removal performed by [facility]. Liquid Colace applied to bilateral external Auditory Canal,Both Canals irrigated with water, Cerumen completely removed, External canal clear,TM intact. Patient tolerated procedure well      Ear Cerumen Removal    Date/Time: 3/20/2023 11:00 AM  Performed by: Nayeli Elkins NP  Authorized by: Nayeli Elkins NP     Time out: Immediately prior to procedure a "time out" was called to verify the correct patient, procedure, equipment, support staff and site/side marked as required.    Consent Done?:  Yes (Verbal)  Medication Used:  Other (pericolace)  Location details:  Both ears  Procedure type: irrigation    Cerumen  Removal Results:  Cerumen completely removed  Patient tolerance:  Patient tolerated the procedure well with no immediate complications         Patient Instructions   Please drink plenty of fluids.  Please get " plenty of rest.  Please return here or go to the Emergency Department for any concerns or worsening of condition.    If you were prescribed antibiotics, please take them to completion.  If you do not have Hypertension or any history of palpitations, it is ok to take over the counter plain Allegra or Claritin or Zyrtec.   If you do have Hypertension or palpitations, it is safe to take Coricidin HBP for relief of sinus symptoms.  We recommend you take over Flonase (Fluticasone) or another nasally inhaled steroid unless you are already taking one.  Nasal irrigation with a saline spray or Netti Pot like device per their directions is also recommended.  If not allergic, please take over the counter Tylenol (Acetaminophen) and/or Motrin (Ibuprofen) as directed for control of pain and/or fever.  Please follow up with your primary care doctor or specialist as needed.

## 2023-03-20 NOTE — TELEPHONE ENCOUNTER
Spoke to patient and she states she is having a burning sensation by the urethra, she has been having these symptoms for 3 days. She also states the bottom of her stomach has been hurting.

## 2023-03-20 NOTE — TELEPHONE ENCOUNTER
----- Message from Cookie Tobin sent at 3/20/2023  8:43 AM CDT -----  .Type:  Needs Medical Advice    Who Called: pt  Symptoms (please be specific): bladder infection   How long has patient had these symptoms:  4 days  Pharmacy name and phone #:  [id number] - [place], [place] - [address]  Would the patient rather a call back or a response via [facility]? Call back  Best Call Back Number: [phone]  Additional Information:     Pt would like a call back if medication can be sent to the pharmacy

## 2023-03-20 NOTE — PATIENT INSTRUCTIONS
Please drink plenty of fluids.  Please get plenty of rest.  Please return here or go to the Emergency Department for any concerns or worsening of condition.    If you were prescribed antibiotics, please take them to completion.  If you do not have Hypertension or any history of palpitations, it is ok to take over the counter plain Allegra or Claritin or Zyrtec.   If you do have Hypertension or palpitations, it is safe to take Coricidin HBP for relief of sinus symptoms.  We recommend you take over Flonase (Fluticasone) or another nasally inhaled steroid unless you are already taking one.  Nasal irrigation with a saline spray or Netti Pot like device per their directions is also recommended.  If not allergic, please take over the counter Tylenol (Acetaminophen) and/or Motrin (Ibuprofen) as directed for control of pain and/or fever.  Please follow up with your primary care doctor or specialist as needed.

## 2023-03-20 NOTE — TELEPHONE ENCOUNTER
----- Message from Padilla Abernathy sent at 3/20/2023  8:54 AM CDT -----  Contact: pt  .Type:  Needs Medical Advice    Who Called: pt  Would the patient rather a call back or a response via [facility]? Call back  Best Call Back Number: [phone]   Additional Information: Pt. Is returning a call back to the office.        Statement Selected

## 2023-03-20 NOTE — PROCEDURES
"Ear Cerumen Removal    Date/Time: 3/20/2023 11:00 AM  Performed by: Nayeli Elkins NP  Authorized by: Nayeli Elkins NP     Time out: Immediately prior to procedure a "time out" was called to verify the correct patient, procedure, equipment, support staff and site/side marked as required.    Consent Done?:  Yes (Verbal)  Medication Used:  Other (pericolace)  Location details:  Both ears  Procedure type: irrigation    Cerumen  Removal Results:  Cerumen completely removed  Patient tolerance:  Patient tolerated the procedure well with no immediate complications  "

## 2023-03-23 ENCOUNTER — TELEPHONE (OUTPATIENT)
Dept: FAMILY MEDICINE | Facility: CLINIC | Age: 83
End: 2023-03-23

## 2023-03-23 ENCOUNTER — OFFICE VISIT (OUTPATIENT)
Dept: FAMILY MEDICINE | Facility: CLINIC | Age: 83
End: 2023-03-23
Payer: MEDICARE

## 2023-03-23 VITALS
BODY MASS INDEX: 26.19 KG/M2 | SYSTOLIC BLOOD PRESSURE: 144 MMHG | WEIGHT: 138.69 LBS | DIASTOLIC BLOOD PRESSURE: 84 MMHG | HEART RATE: 72 BPM | HEIGHT: 61 IN | OXYGEN SATURATION: 98 %

## 2023-03-23 DIAGNOSIS — R73.03 PREDIABETES: ICD-10-CM

## 2023-03-23 DIAGNOSIS — E03.9 ACQUIRED HYPOTHYROIDISM: ICD-10-CM

## 2023-03-23 DIAGNOSIS — I10 ESSENTIAL HYPERTENSION: ICD-10-CM

## 2023-03-23 DIAGNOSIS — E78.00 PURE HYPERCHOLESTEROLEMIA: ICD-10-CM

## 2023-03-23 DIAGNOSIS — R30.0 DYSURIA: ICD-10-CM

## 2023-03-23 DIAGNOSIS — R53.83 OTHER FATIGUE: ICD-10-CM

## 2023-03-23 PROCEDURE — 1101F PR PT FALLS ASSESS DOC 0-1 FALLS W/OUT INJ PAST YR: ICD-10-PCS | Mod: HCNC,CPTII,S$GLB, | Performed by: STUDENT IN AN ORGANIZED HEALTH CARE EDUCATION/TRAINING PROGRAM

## 2023-03-23 PROCEDURE — 1126F AMNT PAIN NOTED NONE PRSNT: CPT | Mod: HCNC,CPTII,S$GLB, | Performed by: STUDENT IN AN ORGANIZED HEALTH CARE EDUCATION/TRAINING PROGRAM

## 2023-03-23 PROCEDURE — 3288F FALL RISK ASSESSMENT DOCD: CPT | Mod: HCNC,CPTII,S$GLB, | Performed by: STUDENT IN AN ORGANIZED HEALTH CARE EDUCATION/TRAINING PROGRAM

## 2023-03-23 PROCEDURE — 3288F PR FALLS RISK ASSESSMENT DOCUMENTED: ICD-10-PCS | Mod: HCNC,CPTII,S$GLB, | Performed by: STUDENT IN AN ORGANIZED HEALTH CARE EDUCATION/TRAINING PROGRAM

## 2023-03-23 PROCEDURE — 1126F PR PAIN SEVERITY QUANTIFIED, NO PAIN PRESENT: ICD-10-PCS | Mod: HCNC,CPTII,S$GLB, | Performed by: STUDENT IN AN ORGANIZED HEALTH CARE EDUCATION/TRAINING PROGRAM

## 2023-03-23 PROCEDURE — 99499 UNLISTED E&M SERVICE: CPT | Mod: HCNC,S$GLB,, | Performed by: STUDENT IN AN ORGANIZED HEALTH CARE EDUCATION/TRAINING PROGRAM

## 2023-03-23 PROCEDURE — 3079F PR MOST RECENT DIASTOLIC BLOOD PRESSURE 80-89 MM HG: ICD-10-PCS | Mod: HCNC,CPTII,S$GLB, | Performed by: STUDENT IN AN ORGANIZED HEALTH CARE EDUCATION/TRAINING PROGRAM

## 2023-03-23 PROCEDURE — 1159F MED LIST DOCD IN RCRD: CPT | Mod: HCNC,CPTII,S$GLB, | Performed by: STUDENT IN AN ORGANIZED HEALTH CARE EDUCATION/TRAINING PROGRAM

## 2023-03-23 PROCEDURE — 99215 PR OFFICE/OUTPT VISIT, EST, LEVL V, 40-54 MIN: ICD-10-PCS | Mod: HCNC,S$GLB,, | Performed by: STUDENT IN AN ORGANIZED HEALTH CARE EDUCATION/TRAINING PROGRAM

## 2023-03-23 PROCEDURE — 99999 PR PBB SHADOW E&M-EST. PATIENT-LVL III: CPT | Mod: PBBFAC,HCNC,, | Performed by: STUDENT IN AN ORGANIZED HEALTH CARE EDUCATION/TRAINING PROGRAM

## 2023-03-23 PROCEDURE — 3077F SYST BP >= 140 MM HG: CPT | Mod: HCNC,CPTII,S$GLB, | Performed by: STUDENT IN AN ORGANIZED HEALTH CARE EDUCATION/TRAINING PROGRAM

## 2023-03-23 PROCEDURE — 99215 OFFICE O/P EST HI 40 MIN: CPT | Mod: HCNC,S$GLB,, | Performed by: STUDENT IN AN ORGANIZED HEALTH CARE EDUCATION/TRAINING PROGRAM

## 2023-03-23 PROCEDURE — 3077F PR MOST RECENT SYSTOLIC BLOOD PRESSURE >= 140 MM HG: ICD-10-PCS | Mod: HCNC,CPTII,S$GLB, | Performed by: STUDENT IN AN ORGANIZED HEALTH CARE EDUCATION/TRAINING PROGRAM

## 2023-03-23 PROCEDURE — 1159F PR MEDICATION LIST DOCUMENTED IN MEDICAL RECORD: ICD-10-PCS | Mod: HCNC,CPTII,S$GLB, | Performed by: STUDENT IN AN ORGANIZED HEALTH CARE EDUCATION/TRAINING PROGRAM

## 2023-03-23 PROCEDURE — 99999 PR PBB SHADOW E&M-EST. PATIENT-LVL III: ICD-10-PCS | Mod: PBBFAC,HCNC,, | Performed by: STUDENT IN AN ORGANIZED HEALTH CARE EDUCATION/TRAINING PROGRAM

## 2023-03-23 PROCEDURE — 1160F PR REVIEW ALL MEDS BY PRESCRIBER/CLIN PHARMACIST DOCUMENTED: ICD-10-PCS | Mod: HCNC,CPTII,S$GLB, | Performed by: STUDENT IN AN ORGANIZED HEALTH CARE EDUCATION/TRAINING PROGRAM

## 2023-03-23 PROCEDURE — 1101F PT FALLS ASSESS-DOCD LE1/YR: CPT | Mod: HCNC,CPTII,S$GLB, | Performed by: STUDENT IN AN ORGANIZED HEALTH CARE EDUCATION/TRAINING PROGRAM

## 2023-03-23 PROCEDURE — 1160F RVW MEDS BY RX/DR IN RCRD: CPT | Mod: HCNC,CPTII,S$GLB, | Performed by: STUDENT IN AN ORGANIZED HEALTH CARE EDUCATION/TRAINING PROGRAM

## 2023-03-23 PROCEDURE — 3079F DIAST BP 80-89 MM HG: CPT | Mod: HCNC,CPTII,S$GLB, | Performed by: STUDENT IN AN ORGANIZED HEALTH CARE EDUCATION/TRAINING PROGRAM

## 2023-03-23 NOTE — PROGRESS NOTES
[facility] Note    Chief Complaint      Chief Complaint   Patient presents with    Establish Care     History of Present Illness      HPI    Joanne Daniel is a 82 y.o. 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 Otherwise she is compliant with all her medications and describe her mood to be great today.    Urology : Anabel Berumen NP  Endocrinology : Dr Lesly Cook    Problem List Addressed This Visit:    1. Dysuria  -     Urinalysis, Reflex to Urine Culture Urine, Clean Catch; Future  -     CBC auto differential; Future; Expected date: 03/23/2023    2. Other fatigue    3. Acquired hypothyroidism  Overview:    Lab Results   Component Value Date    TSH 1.100 08/22/2022    F2WGFOY 8.5 12/30/2020    FREET4 0.95 08/22/2022     - well controlled  - okay to change to generic levothyroxine 50 mcg daily    Orders:  -     LIPID PANEL; Future; Expected date: 03/23/2023  -     TSH; Future; Expected date: 03/23/2023  -     T4, FREE; Future; Expected date: 03/23/2023    4. Essential hypertension  Overview:  - still well controlled without medication  - resolved    Orders:  -     Comprehensive Metabolic Panel; Future; Expected date: 03/23/2023  -     T4, FREE; Future; Expected date: 03/23/2023    5. Prediabetes  -     HEMOGLOBIN A1C; Future; Expected date: 03/23/2023    6. Pure hypercholesterolemia  Overview:  Lab Results   Component Value Date    LDLCALC 150.2 03/04/2022     - unable tolerate rosuvastatin 5 mg daily  - recommend goal LDL <140  - discussed recommendation for diet and  cardiovascular exercise  - counseling on lifestyle modifications for risk factor reduction  - have discussed trying pravastatin 10 mg nightly with CoQ10 100 mg daily and uptitrate as tolerated  - patient declined           Health Maintenance   Topic Date Due    DEXA Scan  03/04/2024    Lipid Panel  03/04/2027    TETANUS VACCINE  03/01/2028    Colonoscopy  Discontinued       Past Medical History:   Diagnosis Date    Anemia of chronic disease 10/22/2018    Colon polyps     Foot drop, left foot     Glaucoma     Hyperlipidemia     Thrombocytosis 10/2/2018    Urinary tract infection     Vaginal infection        Past Surgical History:   Procedure Laterality Date    COLONOSCOPY  08/09/2019    Mild diverticulosis of the desending colon and sigmoid colon. Polyp 12 mm in the hepatic flexure    EYE SURGERY Right     POLYPECTOMY         family history is not on file.    Social History     Tobacco Use    Smoking status: Never    Smokeless tobacco: Never   Substance Use Topics    Alcohol use: No     Alcohol/week: 0.0 standard drinks    Drug use: No       Review of Systems   Constitutional:  Negative for fatigue and fever.   Respiratory:  Negative for cough and chest tightness.    Gastrointestinal:  Negative for abdominal pain, diarrhea and vomiting.   Endocrine: Negative for polydipsia and polyphagia.   Genitourinary:  Negative for difficulty urinating, dysuria and frequency.   Musculoskeletal:  Negative for arthralgias, back pain, gait problem and joint swelling.   Skin:  Negative for rash.   Neurological:  Negative for seizures, weakness, numbness and headaches.   Psychiatric/Behavioral:  Negative for sleep disturbance.      Outpatient Encounter Medications as of 3/23/2023   Medication Sig Dispense Refill    estradioL (ESTRACE) 0.01 % (0.1 mg/gram) vaginal cream Place 1 g vaginally twice a week. 8 g 11    fluticasone propionate (FLONASE) 50 mcg/actuation nasal spray 2 sprays (100 mcg total) by Each Nostril route daily as needed  "for Rhinitis. 15.8 mL 0    LEVOXYL 50 mcg tablet Take 1 tablet (50 mcg total) by mouth before breakfast. 90 tablet 3    magnesium oxide 200 mg magnesium Tab Take 1 tablet by mouth.      nitrofurantoin, macrocrystal-monohydrate, (MACROBID) 100 MG capsule Take 1 capsule (100 mg total) by mouth 2 (two) times daily. for 5 days 10 capsule 0    thiamine 100 MG tablet Take 100 mg by mouth once daily.      vitamin D (VITAMIN D3) 1000 units Tab Take 1,000 Units by mouth once daily.      [DISCONTINUED] calcium-mag oxide-vitamin D3 185- mg-mg-unit Cap Take by mouth 3 (three) times daily.      [DISCONTINUED] loratadine (CLARITIN) 10 mg tablet Take 1 tablet (10 mg total) by mouth daily as needed for Allergies. 30 tablet 0     No facility-administered encounter medications on file as of 3/23/2023.        Review of patient's allergies indicates:   Allergen Reactions    Fosamax [alendronate] Other (See Comments)     Patient unsure but reports that she was told not to take medication       Physical Exam      Vital Signs  Pulse: 72  SpO2: 98 %  BP: (!) 144/84  BP Location: Left arm  Patient Position: Sitting  Pain Score: 0-No pain  Height and Weight  Height: 5' 1" (154.9 cm)  Weight: 62.9 kg (138 lb 11.2 oz)  BSA (Calculated - sq m): 1.65 sq meters  BMI (Calculated): 26.2  Weight in (lb) to have BMI = 25: 132]    Physical Exam  Vitals reviewed.   Constitutional:       Appearance: Normal appearance.   HENT:      Head: Normocephalic and atraumatic.      Right Ear: Tympanic membrane normal.      Left Ear: Tympanic membrane normal.      Mouth/Throat:      Mouth: Mucous membranes are moist.      Pharynx: Oropharynx is clear.   Eyes:      Extraocular Movements: Extraocular movements intact.      Conjunctiva/sclera: Conjunctivae normal.      Pupils: Pupils are equal, round, and reactive to light.   Cardiovascular:      Rate and Rhythm: Normal rate and regular rhythm.      Pulses: Normal pulses.      Heart sounds: Normal heart sounds. "   Pulmonary:      Effort: Pulmonary effort is normal.      Breath sounds: Normal breath sounds. No wheezing or rales.   Abdominal:      General: Bowel sounds are normal.      Palpations: Abdomen is soft.   Musculoskeletal:      Cervical back: Normal range of motion.      Right lower leg: No edema.      Left lower leg: No edema.   Skin:     General: Skin is warm and dry.      Findings: No rash.   Neurological:      General: No focal deficit present.      Mental Status: She is alert and oriented to person, place, and time.      Sensory: No sensory deficit.   Psychiatric:         Mood and Affect: Mood normal.         Behavior: Behavior normal.        Laboratory:  CBC:  Recent Labs   Lab Result Units 03/23/23  1107   WBC K/uL 7.83   RBC M/uL 4.14   Hemoglobin g/dL 12.3   Hematocrit % 38.3   Platelets K/uL 383   MCV fL 93   MCH pg 29.7   MCHC g/dL 32.1     CMP:  Recent Labs   Lab Result Units 03/23/23  1107   Glucose mg/dL 91   Calcium mg/dL 9.4   Albumin g/dL 4.2   Total Protein g/dL 7.9   Sodium mmol/L 141   Potassium mmol/L 4.5   CO2 mmol/L 30*   Chloride mmol/L 106   BUN mg/dL 15   Alkaline Phosphatase U/L 107   ALT U/L 22   AST U/L 32   Total Bilirubin mg/dL 0.4     URINALYSIS:  Recent Labs   Lab Result Units 03/23/23  1103   Color, UA  Yellow   Specific Gravity, UA  1.010   pH, UA  6.0   Protein, UA  Negative   Bacteria /hpf Few*   Nitrite, UA  Negative   Leukocytes, UA  Trace*   Urobilinogen, UA EU/dL Negative      LIPIDS:  Recent Labs   Lab Result Units 03/23/23  1107   TSH uIU/mL 1.680     TSH:  Recent Labs   Lab Result Units 03/23/23  1107   TSH uIU/mL 1.680     A1C:  No results for input(s): HGBA1C in the last 2160 hours.    Radiology:      Assessment/Plan     Joanne Daniel is a 82 y.o.female with:    1. Dysuria  -s/p urgent care and urinalysis-inconclusive  -currently on macrobid, advised to complete course pending repeat UC  - Urinalysis, Reflex to Urine Culture Urine, Clean Catch; Future  - CBC auto  differential; Future    2. Other fatigue  -possibly due to dehydration/UTI  -last TFT WNL (3/4/3022) WNL  C/w current regimen    3. Acquired hypothyroidism  -last TFT WNL (3/4/3022) WNL  -C/w current regimen  - LIPID PANEL; Future  - TSH; Future  - T4, FREE; Future    4. Essential hypertension  -BP >130/80, not in any pain  -currently not on any medications, reluctant to start medications, benefits explained  -Patient advised to monitor BP  - Comprehensive Metabolic Panel; Future  - T4, FREE; Future    5. Prediabetes  -Last HbA1C elevated @ 5.8(02/2022)  -c/w life style modifications  - HEMOGLOBIN A1C; Future    6. Pure hypercholesterolemia  -currently not on any medications  -still reluctant to start  -c/w life style modifications      -Continue current medications and maintain follow up with specialists.      Patient verbalizes understanding and agrees with current treatment plan.      Dr Ellen Lino MD  [facility]

## 2023-03-23 NOTE — TELEPHONE ENCOUNTER
Spoke with pt and I explained that when I was going over the after visit summary with her and her , I said disregard the 4 pm time you guys will come in at 1 pm together.  Pt understood verbally.           ----- Message from Roslyn Ng sent at 3/23/2023 12:44 PM CDT -----  Type:  Patient Returning Call    Who Called:pt  Would the patient rather a call back or a response via [facility]? Call   Best Call Back Number: [phone]  Additional Information:     Requesting to get appt same time as appt

## 2023-03-28 ENCOUNTER — PATIENT MESSAGE (OUTPATIENT)
Dept: FAMILY MEDICINE | Facility: CLINIC | Age: 83
End: 2023-03-28
Payer: MEDICARE

## 2023-04-06 ENCOUNTER — OFFICE VISIT (OUTPATIENT)
Dept: FAMILY MEDICINE | Facility: CLINIC | Age: 83
End: 2023-04-06
Payer: MEDICARE

## 2023-04-06 VITALS
WEIGHT: 139.81 LBS | HEIGHT: 61 IN | OXYGEN SATURATION: 96 % | SYSTOLIC BLOOD PRESSURE: 124 MMHG | DIASTOLIC BLOOD PRESSURE: 72 MMHG | HEART RATE: 71 BPM | BODY MASS INDEX: 26.4 KG/M2

## 2023-04-06 DIAGNOSIS — R73.03 PREDIABETES: ICD-10-CM

## 2023-04-06 DIAGNOSIS — E78.00 PURE HYPERCHOLESTEROLEMIA: ICD-10-CM

## 2023-04-06 DIAGNOSIS — E03.9 ACQUIRED HYPOTHYROIDISM: ICD-10-CM

## 2023-04-06 DIAGNOSIS — I10 ESSENTIAL HYPERTENSION: ICD-10-CM

## 2023-04-06 DIAGNOSIS — N30.00 ACUTE CYSTITIS WITHOUT HEMATURIA: Primary | ICD-10-CM

## 2023-04-06 PROBLEM — N30.01 ACUTE CYSTITIS WITH HEMATURIA: Status: ACTIVE | Noted: 2023-04-06

## 2023-04-06 PROCEDURE — 3074F PR MOST RECENT SYSTOLIC BLOOD PRESSURE < 130 MM HG: ICD-10-PCS | Mod: HCNC,CPTII,S$GLB, | Performed by: STUDENT IN AN ORGANIZED HEALTH CARE EDUCATION/TRAINING PROGRAM

## 2023-04-06 PROCEDURE — 1126F PR PAIN SEVERITY QUANTIFIED, NO PAIN PRESENT: ICD-10-PCS | Mod: HCNC,CPTII,S$GLB, | Performed by: STUDENT IN AN ORGANIZED HEALTH CARE EDUCATION/TRAINING PROGRAM

## 2023-04-06 PROCEDURE — 99214 PR OFFICE/OUTPT VISIT, EST, LEVL IV, 30-39 MIN: ICD-10-PCS | Mod: HCNC,S$GLB,, | Performed by: STUDENT IN AN ORGANIZED HEALTH CARE EDUCATION/TRAINING PROGRAM

## 2023-04-06 PROCEDURE — 3078F DIAST BP <80 MM HG: CPT | Mod: HCNC,CPTII,S$GLB, | Performed by: STUDENT IN AN ORGANIZED HEALTH CARE EDUCATION/TRAINING PROGRAM

## 2023-04-06 PROCEDURE — 1126F AMNT PAIN NOTED NONE PRSNT: CPT | Mod: HCNC,CPTII,S$GLB, | Performed by: STUDENT IN AN ORGANIZED HEALTH CARE EDUCATION/TRAINING PROGRAM

## 2023-04-06 PROCEDURE — 3078F PR MOST RECENT DIASTOLIC BLOOD PRESSURE < 80 MM HG: ICD-10-PCS | Mod: HCNC,CPTII,S$GLB, | Performed by: STUDENT IN AN ORGANIZED HEALTH CARE EDUCATION/TRAINING PROGRAM

## 2023-04-06 PROCEDURE — 1159F MED LIST DOCD IN RCRD: CPT | Mod: HCNC,CPTII,S$GLB, | Performed by: STUDENT IN AN ORGANIZED HEALTH CARE EDUCATION/TRAINING PROGRAM

## 2023-04-06 PROCEDURE — 1101F PR PT FALLS ASSESS DOC 0-1 FALLS W/OUT INJ PAST YR: ICD-10-PCS | Mod: HCNC,CPTII,S$GLB, | Performed by: STUDENT IN AN ORGANIZED HEALTH CARE EDUCATION/TRAINING PROGRAM

## 2023-04-06 PROCEDURE — 1160F PR REVIEW ALL MEDS BY PRESCRIBER/CLIN PHARMACIST DOCUMENTED: ICD-10-PCS | Mod: HCNC,CPTII,S$GLB, | Performed by: STUDENT IN AN ORGANIZED HEALTH CARE EDUCATION/TRAINING PROGRAM

## 2023-04-06 PROCEDURE — 99999 PR PBB SHADOW E&M-EST. PATIENT-LVL III: ICD-10-PCS | Mod: PBBFAC,HCNC,, | Performed by: STUDENT IN AN ORGANIZED HEALTH CARE EDUCATION/TRAINING PROGRAM

## 2023-04-06 PROCEDURE — 3288F PR FALLS RISK ASSESSMENT DOCUMENTED: ICD-10-PCS | Mod: HCNC,CPTII,S$GLB, | Performed by: STUDENT IN AN ORGANIZED HEALTH CARE EDUCATION/TRAINING PROGRAM

## 2023-04-06 PROCEDURE — 1160F RVW MEDS BY RX/DR IN RCRD: CPT | Mod: HCNC,CPTII,S$GLB, | Performed by: STUDENT IN AN ORGANIZED HEALTH CARE EDUCATION/TRAINING PROGRAM

## 2023-04-06 PROCEDURE — 1159F PR MEDICATION LIST DOCUMENTED IN MEDICAL RECORD: ICD-10-PCS | Mod: HCNC,CPTII,S$GLB, | Performed by: STUDENT IN AN ORGANIZED HEALTH CARE EDUCATION/TRAINING PROGRAM

## 2023-04-06 PROCEDURE — 1101F PT FALLS ASSESS-DOCD LE1/YR: CPT | Mod: HCNC,CPTII,S$GLB, | Performed by: STUDENT IN AN ORGANIZED HEALTH CARE EDUCATION/TRAINING PROGRAM

## 2023-04-06 PROCEDURE — 99214 OFFICE O/P EST MOD 30 MIN: CPT | Mod: HCNC,S$GLB,, | Performed by: STUDENT IN AN ORGANIZED HEALTH CARE EDUCATION/TRAINING PROGRAM

## 2023-04-06 PROCEDURE — 99999 PR PBB SHADOW E&M-EST. PATIENT-LVL III: CPT | Mod: PBBFAC,HCNC,, | Performed by: STUDENT IN AN ORGANIZED HEALTH CARE EDUCATION/TRAINING PROGRAM

## 2023-04-06 PROCEDURE — 3074F SYST BP LT 130 MM HG: CPT | Mod: HCNC,CPTII,S$GLB, | Performed by: STUDENT IN AN ORGANIZED HEALTH CARE EDUCATION/TRAINING PROGRAM

## 2023-04-06 PROCEDURE — 3288F FALL RISK ASSESSMENT DOCD: CPT | Mod: HCNC,CPTII,S$GLB, | Performed by: STUDENT IN AN ORGANIZED HEALTH CARE EDUCATION/TRAINING PROGRAM

## 2023-04-06 NOTE — PROGRESS NOTES
[facility] Note    Chief Complaint      Chief Complaint   Patient presents with    Follow-up     2wk f/u     History of Present Illness      Follow-up  Pertinent negatives include no abdominal pain, arthralgias, coughing, fatigue, fever, headaches, joint swelling, numbness, rash, vomiting or weakness.     Joanne Daniel is a 82 y.o. 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 She is s/p 5 day course of macrobid , now  endorses resolution of symptoms and has no new complaints today.     Urology : Anabel Berumen NP  Endocrinology : Dr Lesly Palacioslan    Acute cystitis    2. Other fatigue    3. Acquired hypothyroidism  Overview:    Lab Results   Component Value Date    TSH 1.100 08/22/2022    S3OJNDS 8.5 12/30/2020    FREET4 0.95 08/22/2022     - well controlled  - okay to change to generic levothyroxine 50 mcg daily    Orders:  -     LIPID PANEL; Future; Expected date: 03/23/2023  -     TSH; Future; Expected date: 03/23/2023  -     T4, FREE; Future; Expected date: 03/23/2023    4. Essential hypertension  Overview:  - still well controlled without medication  - resolved    Orders:  -     Comprehensive Metabolic Panel; Future; Expected date: 03/23/2023  -     T4, FREE; Future; Expected date: 03/23/2023    5. Prediabetes  -     HEMOGLOBIN A1C; Future; Expected date: 03/23/2023    6. Pure hypercholesterolemia  Overview:  Lab Results   Component Value Date    LDLCALC 150.2 03/04/2022     - unable tolerate rosuvastatin 5 mg daily  - recommend goal LDL <140  - discussed recommendation for diet and cardiovascular exercise  - counseling on lifestyle modifications for risk factor reduction  - have discussed trying pravastatin 10 mg nightly with CoQ10 100 mg daily and uptitrate as tolerated  - patient  declined           Health Maintenance   Topic Date Due    DEXA Scan  03/04/2024    TETANUS VACCINE  03/01/2028    Lipid Panel  03/23/2028    Colonoscopy  Discontinued       Past Medical History:   Diagnosis Date    Anemia of chronic disease 10/22/2018    Colon polyps     Foot drop, left foot     Glaucoma     Hyperlipidemia     Thrombocytosis 10/2/2018    Urinary tract infection     Vaginal infection        Past Surgical History:   Procedure Laterality Date    COLONOSCOPY  08/09/2019    Mild diverticulosis of the desending colon and sigmoid colon. Polyp 12 mm in the hepatic flexure    EYE SURGERY Right     POLYPECTOMY         family history is not on file.    Social History     Tobacco Use    Smoking status: Never    Smokeless tobacco: Never   Substance Use Topics    Alcohol use: No     Alcohol/week: 0.0 standard drinks    Drug use: No       Review of Systems   Constitutional:  Negative for fatigue and fever.   Respiratory:  Negative for cough and chest tightness.    Gastrointestinal:  Negative for abdominal pain, diarrhea and vomiting.   Endocrine: Negative for polydipsia and polyphagia.   Genitourinary:  Negative for difficulty urinating, dysuria and frequency.   Musculoskeletal:  Negative for arthralgias, back pain, gait problem and joint swelling.   Skin:  Negative for rash.   Neurological:  Negative for seizures, weakness, numbness and headaches.   Psychiatric/Behavioral:  Negative for sleep disturbance.      Outpatient Encounter Medications as of 4/6/2023   Medication Sig Dispense Refill    estradioL (ESTRACE) 0.01 % (0.1 mg/gram) vaginal cream Place 1 g vaginally twice a week. 8 g 11    LEVOXYL 50 mcg tablet Take 1 tablet (50 mcg total) by mouth before breakfast. 90 tablet 3    magnesium oxide 200 mg magnesium Tab Take 1 tablet by mouth.      thiamine 100 MG tablet Take 100 mg by mouth once daily.      vitamin D (VITAMIN D3) 1000 units Tab Take 1,000 Units by mouth once daily.      fluticasone propionate  "(FLONASE) 50 mcg/actuation nasal spray 2 sprays (100 mcg total) by Each Nostril route daily as needed for Rhinitis. (Patient not taking: Reported on 2023) 15.8 mL 0    [] nitrofurantoin, macrocrystal-monohydrate, (MACROBID) 100 MG capsule Take 1 capsule (100 mg total) by mouth 2 (two) times daily. for 5 days 10 capsule 0     No facility-administered encounter medications on file as of 2023.        Review of patient's allergies indicates:   Allergen Reactions    Fosamax [alendronate] Other (See Comments)     Patient unsure but reports that she was told not to take medication       Physical Exam      Vital Signs  Pulse: 71  SpO2: 96 %  BP: 124/72  Pain Score: 0-No pain  Height and Weight  Height: 5' 1" (154.9 cm)  Weight: 63.4 kg (139 lb 12.8 oz)  BSA (Calculated - sq m): 1.65 sq meters  BMI (Calculated): 26.4  Weight in (lb) to have BMI = 25: 132]    Physical Exam  Vitals reviewed.   Constitutional:       Appearance: Normal appearance.   HENT:      Head: Normocephalic and atraumatic.      Right Ear: Tympanic membrane normal.      Left Ear: Tympanic membrane normal.      Mouth/Throat:      Mouth: Mucous membranes are moist.      Pharynx: Oropharynx is clear.   Eyes:      Extraocular Movements: Extraocular movements intact.      Conjunctiva/sclera: Conjunctivae normal.      Pupils: Pupils are equal, round, and reactive to light.   Cardiovascular:      Rate and Rhythm: Normal rate and regular rhythm.      Pulses: Normal pulses.      Heart sounds: Normal heart sounds.   Pulmonary:      Effort: Pulmonary effort is normal.      Breath sounds: Normal breath sounds. No wheezing or rales.   Abdominal:      General: Bowel sounds are normal.      Palpations: Abdomen is soft.   Musculoskeletal:      Cervical back: Normal range of motion.      Right lower leg: No edema.      Left lower leg: No edema.   Skin:     General: Skin is warm and dry.      Findings: No rash.   Neurological:      General: No focal deficit " present.      Mental Status: She is alert and oriented to person, place, and time.      Sensory: No sensory deficit.   Psychiatric:         Mood and Affect: Mood normal.         Behavior: Behavior normal.        Laboratory:  CBC:  Recent Labs   Lab Result Units 03/23/23  1107   WBC K/uL 7.83   RBC M/uL 4.14   Hemoglobin g/dL 12.3   Hematocrit % 38.3   Platelets K/uL 383   MCV fL 93   MCH pg 29.7   MCHC g/dL 32.1       CMP:  Recent Labs   Lab Result Units 03/23/23  1107   Glucose mg/dL 91   Calcium mg/dL 9.4   Albumin g/dL 4.2   Total Protein g/dL 7.9   Sodium mmol/L 141   Potassium mmol/L 4.5   CO2 mmol/L 30*   Chloride mmol/L 106   BUN mg/dL 15   Alkaline Phosphatase U/L 107   ALT U/L 22   AST U/L 32   Total Bilirubin mg/dL 0.4       URINALYSIS:  Recent Labs   Lab Result Units 03/23/23  1103   Color, UA  Yellow   Specific Gravity, UA  1.010   pH, UA  6.0   Protein, UA  Negative   Bacteria /hpf Few*   Nitrite, UA  Negative   Leukocytes, UA  Trace*   Urobilinogen, UA EU/dL Negative        LIPIDS:  Recent Labs   Lab Result Units 03/23/23  1107   TSH uIU/mL 1.680   HDL mg/dL 66   Cholesterol mg/dL 222*   Triglycerides mg/dL 94   LDL Cholesterol mg/dL 137.2   HDL/Cholesterol Ratio % 29.7   Non-HDL Cholesterol mg/dL 156   Total Cholesterol/HDL Ratio  3.4       TSH:  Recent Labs   Lab Result Units 03/23/23  1107   TSH uIU/mL 1.680       A1C:  Recent Labs   Lab Result Units 03/23/23  1107   Hemoglobin A1C % 5.5         Radiology:      Assessment/Plan     Joanne Daniel is a 82 y.o.female with:    1. Acute cystitis  -urinalysis suggestive  -s/p 5day course of macrobid  -Now resolved  -test of cure today    2. Acquired hypothyroidism  -recent TFT WNL (03/2023)  -c/w current regimen    3. Essential hypertension  -well controlled off medications    4. Prediabetes  -recent HbA1C WNL  -c/w life style modifications    5. Pure hypercholesterolemia  -currently not on any medications  -still reluctant to start  -c/w life style  modifications      -Continue current medications and maintain follow up with specialists.      Patient verbalizes understanding and agrees with current treatment plan.      Dr Ellen Lino MD  [facility]

## 2023-06-14 ENCOUNTER — PATIENT MESSAGE (OUTPATIENT)
Dept: FAMILY MEDICINE | Facility: CLINIC | Age: 83
End: 2023-06-14
Payer: MEDICARE

## 2023-06-14 ENCOUNTER — TELEPHONE (OUTPATIENT)
Dept: FAMILY MEDICINE | Facility: CLINIC | Age: 83
End: 2023-06-14
Payer: MEDICARE

## 2023-10-06 ENCOUNTER — OFFICE VISIT (OUTPATIENT)
Dept: FAMILY MEDICINE | Facility: CLINIC | Age: 83
End: 2023-10-06
Payer: MEDICARE

## 2023-10-06 VITALS
HEIGHT: 61 IN | WEIGHT: 143.5 LBS | DIASTOLIC BLOOD PRESSURE: 74 MMHG | BODY MASS INDEX: 27.09 KG/M2 | SYSTOLIC BLOOD PRESSURE: 130 MMHG | OXYGEN SATURATION: 98 % | HEART RATE: 64 BPM

## 2023-10-06 DIAGNOSIS — I10 ESSENTIAL HYPERTENSION: ICD-10-CM

## 2023-10-06 DIAGNOSIS — E78.00 PURE HYPERCHOLESTEROLEMIA: ICD-10-CM

## 2023-10-06 DIAGNOSIS — E03.9 ACQUIRED HYPOTHYROIDISM: Primary | ICD-10-CM

## 2023-10-06 DIAGNOSIS — T14.8XXA MYALGIA, TRAUMATIC: ICD-10-CM

## 2023-10-06 DIAGNOSIS — E03.9 HYPOTHYROIDISM, UNSPECIFIED TYPE: ICD-10-CM

## 2023-10-06 PROCEDURE — 1159F MED LIST DOCD IN RCRD: CPT | Mod: HCNC,CPTII,S$GLB, | Performed by: STUDENT IN AN ORGANIZED HEALTH CARE EDUCATION/TRAINING PROGRAM

## 2023-10-06 PROCEDURE — 99214 PR OFFICE/OUTPT VISIT, EST, LEVL IV, 30-39 MIN: ICD-10-PCS | Mod: HCNC,S$GLB,, | Performed by: STUDENT IN AN ORGANIZED HEALTH CARE EDUCATION/TRAINING PROGRAM

## 2023-10-06 PROCEDURE — 1160F RVW MEDS BY RX/DR IN RCRD: CPT | Mod: HCNC,CPTII,S$GLB, | Performed by: STUDENT IN AN ORGANIZED HEALTH CARE EDUCATION/TRAINING PROGRAM

## 2023-10-06 PROCEDURE — 1159F PR MEDICATION LIST DOCUMENTED IN MEDICAL RECORD: ICD-10-PCS | Mod: HCNC,CPTII,S$GLB, | Performed by: STUDENT IN AN ORGANIZED HEALTH CARE EDUCATION/TRAINING PROGRAM

## 2023-10-06 PROCEDURE — 3288F PR FALLS RISK ASSESSMENT DOCUMENTED: ICD-10-PCS | Mod: HCNC,CPTII,S$GLB, | Performed by: STUDENT IN AN ORGANIZED HEALTH CARE EDUCATION/TRAINING PROGRAM

## 2023-10-06 PROCEDURE — 3288F FALL RISK ASSESSMENT DOCD: CPT | Mod: HCNC,CPTII,S$GLB, | Performed by: STUDENT IN AN ORGANIZED HEALTH CARE EDUCATION/TRAINING PROGRAM

## 2023-10-06 PROCEDURE — 1100F PR PT FALLS ASSESS DOC 2+ FALLS/FALL W/INJURY/YR: ICD-10-PCS | Mod: HCNC,CPTII,S$GLB, | Performed by: STUDENT IN AN ORGANIZED HEALTH CARE EDUCATION/TRAINING PROGRAM

## 2023-10-06 PROCEDURE — 99214 OFFICE O/P EST MOD 30 MIN: CPT | Mod: HCNC,S$GLB,, | Performed by: STUDENT IN AN ORGANIZED HEALTH CARE EDUCATION/TRAINING PROGRAM

## 2023-10-06 PROCEDURE — 3078F PR MOST RECENT DIASTOLIC BLOOD PRESSURE < 80 MM HG: ICD-10-PCS | Mod: HCNC,CPTII,S$GLB, | Performed by: STUDENT IN AN ORGANIZED HEALTH CARE EDUCATION/TRAINING PROGRAM

## 2023-10-06 PROCEDURE — 3075F SYST BP GE 130 - 139MM HG: CPT | Mod: HCNC,CPTII,S$GLB, | Performed by: STUDENT IN AN ORGANIZED HEALTH CARE EDUCATION/TRAINING PROGRAM

## 2023-10-06 PROCEDURE — 3075F PR MOST RECENT SYSTOLIC BLOOD PRESS GE 130-139MM HG: ICD-10-PCS | Mod: HCNC,CPTII,S$GLB, | Performed by: STUDENT IN AN ORGANIZED HEALTH CARE EDUCATION/TRAINING PROGRAM

## 2023-10-06 PROCEDURE — 1125F AMNT PAIN NOTED PAIN PRSNT: CPT | Mod: HCNC,CPTII,S$GLB, | Performed by: STUDENT IN AN ORGANIZED HEALTH CARE EDUCATION/TRAINING PROGRAM

## 2023-10-06 PROCEDURE — 99999 PR PBB SHADOW E&M-EST. PATIENT-LVL III: CPT | Mod: PBBFAC,HCNC,, | Performed by: STUDENT IN AN ORGANIZED HEALTH CARE EDUCATION/TRAINING PROGRAM

## 2023-10-06 PROCEDURE — 99999 PR PBB SHADOW E&M-EST. PATIENT-LVL III: ICD-10-PCS | Mod: PBBFAC,HCNC,, | Performed by: STUDENT IN AN ORGANIZED HEALTH CARE EDUCATION/TRAINING PROGRAM

## 2023-10-06 PROCEDURE — 1160F PR REVIEW ALL MEDS BY PRESCRIBER/CLIN PHARMACIST DOCUMENTED: ICD-10-PCS | Mod: HCNC,CPTII,S$GLB, | Performed by: STUDENT IN AN ORGANIZED HEALTH CARE EDUCATION/TRAINING PROGRAM

## 2023-10-06 PROCEDURE — 3078F DIAST BP <80 MM HG: CPT | Mod: HCNC,CPTII,S$GLB, | Performed by: STUDENT IN AN ORGANIZED HEALTH CARE EDUCATION/TRAINING PROGRAM

## 2023-10-06 PROCEDURE — 1100F PTFALLS ASSESS-DOCD GE2>/YR: CPT | Mod: HCNC,CPTII,S$GLB, | Performed by: STUDENT IN AN ORGANIZED HEALTH CARE EDUCATION/TRAINING PROGRAM

## 2023-10-06 PROCEDURE — 1125F PR PAIN SEVERITY QUANTIFIED, PAIN PRESENT: ICD-10-PCS | Mod: HCNC,CPTII,S$GLB, | Performed by: STUDENT IN AN ORGANIZED HEALTH CARE EDUCATION/TRAINING PROGRAM

## 2023-10-06 RX ORDER — LEVOTHYROXINE SODIUM 50 UG/1
50 TABLET ORAL
Qty: 90 TABLET | Refills: 3 | Status: SHIPPED | OUTPATIENT
Start: 2023-10-06 | End: 2024-10-05

## 2023-10-06 RX ORDER — NAPROXEN 500 MG/1
500 TABLET ORAL 2 TIMES DAILY WITH MEALS
Qty: 6 TABLET | Refills: 0 | Status: SHIPPED | OUTPATIENT
Start: 2023-10-06 | End: 2023-10-09

## 2023-10-06 NOTE — PROGRESS NOTES
[facility] Note    Chief Complaint      Chief Complaint   Patient presents with    Pain     On side/ribcage from fall     Follow-up     History of Present Illness     Joanne Daniel is a 83 y.o. female with sHTN, HLD, hypothyroidism multinodular goiter, OA, atrophic vaginitis (on topical hormone replacement), Left foot drop with bilateral LE neuropathy for f/u in addition c/o left sided lateral dull aching CP s/p fall on same side 2 days ago while working in her house, no reports of SOB, LOC, HA, bruising or bleeding from any orifices . No other complaints today and takes her medication as prescribed.    Urology : Anabel Berumen NP  Endocrinology : Dr Grace Colon Joey         Health Maintenance   Topic Date Due    DEXA Scan  03/04/2024    TETANUS VACCINE  03/01/2028    Lipid Panel  03/23/2028    Shingles Vaccine  Discontinued    Colonoscopy  Discontinued       Past Medical History:   Diagnosis Date    Anemia of chronic disease 10/22/2018    Colon polyps     Foot drop, left foot     Glaucoma     Hyperlipidemia     Thrombocytosis 10/2/2018    Urinary tract infection     Vaginal infection        Past Surgical History:   Procedure Laterality Date    COLONOSCOPY  08/09/2019    Mild diverticulosis of the desending colon and sigmoid colon. Polyp 12 mm in the hepatic flexure    EYE SURGERY Right     POLYPECTOMY         family history is not on file.    Social History     Tobacco Use    Smoking status: Never    Smokeless tobacco: Never   Substance Use Topics    Alcohol use: No     Alcohol/week: 0.0 standard drinks of alcohol    Drug use: No       Review of Systems   Constitutional:  Negative for fatigue and fever.   Respiratory:  Negative for cough and chest tightness.    Gastrointestinal:  Negative for abdominal pain, diarrhea and vomiting.   Endocrine: Negative for polydipsia and polyphagia.   Genitourinary:  Negative for difficulty urinating, dysuria and frequency.   Musculoskeletal:   "Positive for myalgias. Negative for arthralgias, back pain, gait problem and joint swelling.   Skin:  Negative for rash.   Neurological:  Negative for seizures, weakness, numbness and headaches.   Psychiatric/Behavioral:  Negative for sleep disturbance.        Outpatient Encounter Medications as of 10/6/2023   Medication Sig Dispense Refill    estradioL (ESTRACE) 0.01 % (0.1 mg/gram) vaginal cream Place 1 g vaginally twice a week. 8 g 11    magnesium oxide 200 mg magnesium Tab Take 1 tablet by mouth.      thiamine 100 MG tablet Take 100 mg by mouth once daily.      vitamin D (VITAMIN D3) 1000 units Tab Take 1,000 Units by mouth once daily.      [DISCONTINUED] LEVOXYL 50 mcg tablet Take 1 tablet (50 mcg total) by mouth before breakfast. 90 tablet 3    LEVOXYL 50 mcg tablet Take 1 tablet (50 mcg total) by mouth before breakfast. 90 tablet 3    naproxen (NAPROSYN) 500 MG tablet Take 1 tablet (500 mg total) by mouth 2 (two) times daily with meals. for 3 days 6 tablet 0     No facility-administered encounter medications on file as of 10/6/2023.        Review of patient's allergies indicates:   Allergen Reactions    Fosamax [alendronate] Other (See Comments)     Patient unsure but reports that she was told not to take medication       Physical Exam      Vital Signs  Pulse: 64  SpO2: 98 %  BP: 130/74  Pain Score:   3  Height and Weight  Height: 5' 1" (154.9 cm)  Weight: 65.1 kg (143 lb 8.3 oz)  BSA (Calculated - sq m): 1.67 sq meters  BMI (Calculated): 27.1  Weight in (lb) to have BMI = 25: 132]    Physical Exam  Vitals reviewed.   Constitutional:       Appearance: Normal appearance.   HENT:      Head: Normocephalic and atraumatic.      Mouth/Throat:      Mouth: Mucous membranes are moist.      Pharynx: Oropharynx is clear.   Eyes:      Extraocular Movements: Extraocular movements intact.      Conjunctiva/sclera: Conjunctivae normal.      Pupils: Pupils are equal, round, and reactive to light.   Cardiovascular:      Rate " "and Rhythm: Normal rate and regular rhythm.      Pulses: Normal pulses.      Heart sounds: Normal heart sounds.   Pulmonary:      Effort: Pulmonary effort is normal.      Breath sounds: Normal breath sounds. No wheezing or rales.   Abdominal:      General: Bowel sounds are normal.      Palpations: Abdomen is soft.   Musculoskeletal:         General: Tenderness (mild, at the left lateral chest wall, no sweeling, ecchymosis, laceration seen) present.      Cervical back: Normal range of motion.      Right lower leg: No edema.      Left lower leg: No edema.   Skin:     General: Skin is warm and dry.      Findings: No rash.   Neurological:      General: No focal deficit present.      Mental Status: She is alert and oriented to person, place, and time.      Sensory: No sensory deficit.   Psychiatric:         Mood and Affect: Mood normal.         Behavior: Behavior normal.          Laboratory:  CBC:  No results for input(s): "WBC", "RBC", "HGB", "HCT", "PLT", "MCV", "MCH", "MCHC" in the last 2160 hours.  CMP:  No results for input(s): "GLU", "CALCIUM", "ALBUMIN", "PROT", "NA", "K", "CO2", "CL", "BUN", "ALKPHOS", "ALT", "AST", "BILITOT" in the last 2160 hours.    Invalid input(s): "CREATININ"  URINALYSIS:  No results for input(s): "COLORU", "CLARITYU", "SPECGRAV", "PHUR", "PROTEINUA", "GLUCOSEU", "BILIRUBINCON", "BLOODU", "WBCU", "RBCU", "BACTERIA", "MUCUS", "NITRITE", "LEUKOCYTESUR", "UROBILINOGEN", "HYALINECASTS" in the last 2160 hours.   LIPIDS:  No results for input(s): "TSH", "HDL", "CHOL", "TRIG", "LDLCALC", "CHOLHDL", "NONHDLCHOL", "TOTALCHOLEST" in the last 2160 hours.  TSH:  No results for input(s): "TSH" in the last 2160 hours.  A1C:  No results for input(s): "HGBA1C" in the last 2160 hours.    Radiology:      Assessment/Plan     Joanne Daniel is a 83 y.o.female with:    1. Myalgia  -2/2 trauma  -no other alarming symptoms  -naproxen 500 mg BID with food X 3 days   -patient reassured    2. Acquired " hypothyroidism  -recent TFT WNL (03/2023)  -due for repeat TFT  -c/w current regimen  -followed by endocrinologist     3. Essential hypertension  -well controlled off medications    4. Pure hypercholesterolemia  -currently not on any medications  -still reluctant to start  -c/w life style modifications      -Continue current medications and maintain follow up with specialists.      Patient verbalizes understanding and agrees with current treatment plan.      Dr Ellen Lino MD  [facility] [place]

## 2023-10-09 DIAGNOSIS — E78.00 PURE HYPERCHOLESTEROLEMIA: Primary | ICD-10-CM

## 2023-10-09 RX ORDER — PRAVASTATIN SODIUM 20 MG/1
20 TABLET ORAL DAILY
Qty: 90 TABLET | Refills: 3 | Status: SHIPPED | OUTPATIENT
Start: 2023-10-09 | End: 2023-10-10 | Stop reason: SDUPTHER

## 2023-10-10 ENCOUNTER — TELEPHONE (OUTPATIENT)
Dept: FAMILY MEDICINE | Facility: CLINIC | Age: 83
End: 2023-10-10
Payer: MEDICARE

## 2023-10-10 DIAGNOSIS — E78.00 PURE HYPERCHOLESTEROLEMIA: ICD-10-CM

## 2023-10-10 RX ORDER — PRAVASTATIN SODIUM 20 MG/1
20 TABLET ORAL DAILY
Qty: 90 TABLET | Refills: 3 | Status: SHIPPED | OUTPATIENT
Start: 2023-10-10 | End: 2024-10-09

## 2023-10-10 NOTE — TELEPHONE ENCOUNTER
----- Message from Ellen Lino MD sent at 10/9/2023  4:55 PM CDT -----  Please ask if patient is willing to take cholesterol pill , her number is still persistently high. Thanks

## 2023-10-17 ENCOUNTER — OFFICE VISIT (OUTPATIENT)
Dept: UROLOGY | Facility: CLINIC | Age: 83
End: 2023-10-17
Payer: MEDICARE

## 2023-10-17 VITALS
HEART RATE: 65 BPM | WEIGHT: 142.44 LBS | SYSTOLIC BLOOD PRESSURE: 155 MMHG | DIASTOLIC BLOOD PRESSURE: 66 MMHG | BODY MASS INDEX: 26.89 KG/M2 | HEIGHT: 61 IN

## 2023-10-17 DIAGNOSIS — N39.3 SUI (STRESS URINARY INCONTINENCE, FEMALE): ICD-10-CM

## 2023-10-17 DIAGNOSIS — R35.1 NOCTURIA: ICD-10-CM

## 2023-10-17 DIAGNOSIS — N36.2 URETHRAL CARUNCLE: Primary | ICD-10-CM

## 2023-10-17 DIAGNOSIS — N95.2 ATROPHIC VAGINITIS: ICD-10-CM

## 2023-10-17 PROCEDURE — 1101F PR PT FALLS ASSESS DOC 0-1 FALLS W/OUT INJ PAST YR: ICD-10-PCS | Mod: HCNC,CPTII,S$GLB, | Performed by: NURSE PRACTITIONER

## 2023-10-17 PROCEDURE — 99999 PR PBB SHADOW E&M-EST. PATIENT-LVL III: ICD-10-PCS | Mod: PBBFAC,HCNC,, | Performed by: NURSE PRACTITIONER

## 2023-10-17 PROCEDURE — 1125F AMNT PAIN NOTED PAIN PRSNT: CPT | Mod: HCNC,CPTII,S$GLB, | Performed by: NURSE PRACTITIONER

## 2023-10-17 PROCEDURE — 1125F PR PAIN SEVERITY QUANTIFIED, PAIN PRESENT: ICD-10-PCS | Mod: HCNC,CPTII,S$GLB, | Performed by: NURSE PRACTITIONER

## 2023-10-17 PROCEDURE — 1159F MED LIST DOCD IN RCRD: CPT | Mod: HCNC,CPTII,S$GLB, | Performed by: NURSE PRACTITIONER

## 2023-10-17 PROCEDURE — 3078F DIAST BP <80 MM HG: CPT | Mod: HCNC,CPTII,S$GLB, | Performed by: NURSE PRACTITIONER

## 2023-10-17 PROCEDURE — 1159F PR MEDICATION LIST DOCUMENTED IN MEDICAL RECORD: ICD-10-PCS | Mod: HCNC,CPTII,S$GLB, | Performed by: NURSE PRACTITIONER

## 2023-10-17 PROCEDURE — 3077F PR MOST RECENT SYSTOLIC BLOOD PRESSURE >= 140 MM HG: ICD-10-PCS | Mod: HCNC,CPTII,S$GLB, | Performed by: NURSE PRACTITIONER

## 2023-10-17 PROCEDURE — 3288F FALL RISK ASSESSMENT DOCD: CPT | Mod: HCNC,CPTII,S$GLB, | Performed by: NURSE PRACTITIONER

## 2023-10-17 PROCEDURE — 99999 PR PBB SHADOW E&M-EST. PATIENT-LVL III: CPT | Mod: PBBFAC,HCNC,, | Performed by: NURSE PRACTITIONER

## 2023-10-17 PROCEDURE — 1160F RVW MEDS BY RX/DR IN RCRD: CPT | Mod: HCNC,CPTII,S$GLB, | Performed by: NURSE PRACTITIONER

## 2023-10-17 PROCEDURE — 99213 OFFICE O/P EST LOW 20 MIN: CPT | Mod: HCNC,S$GLB,, | Performed by: NURSE PRACTITIONER

## 2023-10-17 PROCEDURE — 1160F PR REVIEW ALL MEDS BY PRESCRIBER/CLIN PHARMACIST DOCUMENTED: ICD-10-PCS | Mod: HCNC,CPTII,S$GLB, | Performed by: NURSE PRACTITIONER

## 2023-10-17 PROCEDURE — 3077F SYST BP >= 140 MM HG: CPT | Mod: HCNC,CPTII,S$GLB, | Performed by: NURSE PRACTITIONER

## 2023-10-17 PROCEDURE — 3078F PR MOST RECENT DIASTOLIC BLOOD PRESSURE < 80 MM HG: ICD-10-PCS | Mod: HCNC,CPTII,S$GLB, | Performed by: NURSE PRACTITIONER

## 2023-10-17 PROCEDURE — 1101F PT FALLS ASSESS-DOCD LE1/YR: CPT | Mod: HCNC,CPTII,S$GLB, | Performed by: NURSE PRACTITIONER

## 2023-10-17 PROCEDURE — 3288F PR FALLS RISK ASSESSMENT DOCUMENTED: ICD-10-PCS | Mod: HCNC,CPTII,S$GLB, | Performed by: NURSE PRACTITIONER

## 2023-10-17 PROCEDURE — 99213 PR OFFICE/OUTPT VISIT, EST, LEVL III, 20-29 MIN: ICD-10-PCS | Mod: HCNC,S$GLB,, | Performed by: NURSE PRACTITIONER

## 2023-10-17 NOTE — PROGRESS NOTES
Subjective:       Patient ID: Joanne Daniel is a 83 y.o. female.    Chief Complaint: Annual Exam    Patient is a 82 yo WF who is here today for her annual urology exam and evaluation of her urethral caruncle. She reports everything is going well and has no complaints at this time. She is currently using estrace vaginal cream once a month instead of twice weekly.      Follow-up  This is a chronic (urethral caruncle) problem. The current episode started more than 1 year ago. The problem has been unchanged. Associated symptoms include urinary symptoms. Pertinent negatives include no abdominal pain, anorexia, change in bowel habit, chills, fatigue, fever, headaches, nausea, swollen glands, vomiting or weakness. Nothing aggravates the symptoms. Treatments tried: estrace vaginal cream.     Review of Systems   Constitutional:  Negative for chills, fatigue and fever.   Gastrointestinal:  Negative for abdominal pain, anorexia, change in bowel habit, nausea and vomiting.   Genitourinary:  Positive for bladder incontinence and nocturia (x1-2). Negative for decreased urine volume, difficulty urinating, dysuria, flank pain, frequency, hematuria and urgency.   Neurological:  Negative for weakness and headaches.   Psychiatric/Behavioral: Negative.           Objective:      Physical Exam  Vitals and nursing note reviewed.   Constitutional:       General: She is not in acute distress.     Appearance: She is well-developed. She is not ill-appearing.   HENT:      Head: Normocephalic and atraumatic.   Eyes:      Pupils: Pupils are equal, round, and reactive to light.   Cardiovascular:      Rate and Rhythm: Normal rate.   Pulmonary:      Effort: Pulmonary effort is normal. No respiratory distress.   Abdominal:      Palpations: Abdomen is soft.      Tenderness: There is no abdominal tenderness.   Genitourinary:      Musculoskeletal:         General: Normal range of motion.      Cervical back: Normal range of motion.      Comments:  Ambulates with a cane.    Skin:     General: Skin is warm and dry.   Neurological:      Mental Status: She is alert and oriented to person, place, and time.      Coordination: Coordination normal.   Psychiatric:         Mood and Affect: Mood normal.         Behavior: Behavior normal.         Thought Content: Thought content normal.         Judgment: Judgment normal.         Assessment:       Problem List Items Addressed This Visit          Renal/    Urethral caruncle - Primary    Atrophic vaginitis    Nocturia    MARIO (stress urinary incontinence, female)       Plan:           Joanne was seen today for annual exam.    Diagnoses and all orders for this visit:    Urethral caruncle    Atrophic vaginitis    MARIO (stress urinary incontinence, female)    Nocturia    Follow-up in 1 year or sooner if needed.     Anabel Berumen NP

## 2023-10-23 ENCOUNTER — PATIENT MESSAGE (OUTPATIENT)
Dept: ADMINISTRATIVE | Facility: HOSPITAL | Age: 83
End: 2023-10-23
Payer: MEDICARE

## 2023-10-26 ENCOUNTER — PATIENT MESSAGE (OUTPATIENT)
Dept: ADMINISTRATIVE | Facility: HOSPITAL | Age: 83
End: 2023-10-26
Payer: MEDICARE

## 2024-01-24 DIAGNOSIS — N95.2 ATROPHIC VAGINITIS: ICD-10-CM

## 2024-01-24 RX ORDER — ESTRADIOL 0.1 MG/G
1 CREAM VAGINAL
Qty: 43 G | Refills: 1 | Status: SHIPPED | OUTPATIENT
Start: 2024-01-25 | End: 2024-10-21

## 2024-01-24 NOTE — TELEPHONE ENCOUNTER
----- Message from Kristyn Noguera MA sent at 1/24/2024  2:25 PM CST -----    ----- Message -----  From: Sean French  Sent: 1/24/2024  12:40 PM CST  To: Ellen Lino Staff    Type:  RX Refill Request    Who Called: Pt  Refill or New Rx:Refill  RX Name and Strength:estradioL (ESTRACE) 0.01 % (0.1 mg/gram) vaginal cream  How is the patient currently taking it? (ex. 1XDay): Route: Place 1 g vaginally twice a week. - Vaginal  Is this a 30 day or 90 day RX:8g  Preferred Pharmacy with phone number:[id number] - [place], [place] - [address] [address]  Phone: [phone]  Fax: [phone]  Local or Mail Order:local  Ordering Provider: Anabel Berumen N  Would the patient rather a call back or a response via [facility]?   Best Call Back Number:  Additional Information:

## 2024-06-12 ENCOUNTER — TELEPHONE (OUTPATIENT)
Dept: FAMILY MEDICINE | Facility: CLINIC | Age: 84
End: 2024-06-12
Payer: MEDICARE

## 2024-06-12 NOTE — TELEPHONE ENCOUNTER
----- Message from Audrey Wooten sent at 6/11/2024  1:12 PM CDT -----  Type:  Patient Returning Call    Who Called: Pt   Does the patient know what this is regarding?: returning phone   Would the patient rather a call back or a response via [facility]? Call  Best Call Back Number: [phone]   Additional Information:

## 2024-06-21 ENCOUNTER — OFFICE VISIT (OUTPATIENT)
Dept: FAMILY MEDICINE | Facility: CLINIC | Age: 84
End: 2024-06-21
Payer: MEDICARE

## 2024-06-21 VITALS
SYSTOLIC BLOOD PRESSURE: 122 MMHG | BODY MASS INDEX: 26.62 KG/M2 | WEIGHT: 141 LBS | HEIGHT: 61 IN | TEMPERATURE: 98 F | HEART RATE: 64 BPM | OXYGEN SATURATION: 96 % | DIASTOLIC BLOOD PRESSURE: 76 MMHG

## 2024-06-21 DIAGNOSIS — R79.9 ABNORMAL BLOOD CHEMISTRY: ICD-10-CM

## 2024-06-21 DIAGNOSIS — I10 ESSENTIAL HYPERTENSION: ICD-10-CM

## 2024-06-21 DIAGNOSIS — M21.372 LEFT FOOT DROP: ICD-10-CM

## 2024-06-21 DIAGNOSIS — E03.9 ACQUIRED HYPOTHYROIDISM: Primary | ICD-10-CM

## 2024-06-21 DIAGNOSIS — E78.00 PURE HYPERCHOLESTEROLEMIA: ICD-10-CM

## 2024-06-21 DIAGNOSIS — K21.9 GASTROESOPHAGEAL REFLUX DISEASE WITHOUT ESOPHAGITIS: ICD-10-CM

## 2024-06-21 PROCEDURE — 99999 PR PBB SHADOW E&M-EST. PATIENT-LVL IV: CPT | Mod: PBBFAC,HCNC,, | Performed by: STUDENT IN AN ORGANIZED HEALTH CARE EDUCATION/TRAINING PROGRAM

## 2024-06-21 RX ORDER — FAMOTIDINE 40 MG/1
40 TABLET, FILM COATED ORAL NIGHTLY
Qty: 90 TABLET | Refills: 3 | Status: SHIPPED | OUTPATIENT
Start: 2024-06-21 | End: 2025-06-21

## 2024-06-21 RX ORDER — LEVOTHYROXINE SODIUM 50 UG/1
50 TABLET ORAL
Qty: 90 TABLET | Refills: 3 | Status: SHIPPED | OUTPATIENT
Start: 2024-06-21 | End: 2025-06-21

## 2024-06-21 NOTE — PROGRESS NOTES
[facility] Note    Chief Complaint      Chief Complaint   Patient presents with    Follow-up  Worsening left ankle/foot pain  Reflux      History of Present Illness     Joanne Daniel is a 84 y.o. female with sHTN, HLD, hypothyroidism with multinodular goiter, OA, atrophic vaginitis (on topical hormone replacement), Left foot drop with bilateral LE neuropathy for f/u in addition has multiple complaints :   c/o worsening left foot/ankle pain and right great toe deformity now impairing her baseline gait.   She has also been having recurrent 'abnormal taste and choking sensation in the middle of the night or early in the morning. She reports no Cp, SOB , fever, chills, leg swelling    She takes all her medications as prescribed except her cholesterol pill which she never used since prescription due to fear of med SE she 'heard about.    Urology : Anabel Berumen NP  Endocrinology : Dr Grace Colon Joey         Health Maintenance   Topic Date Due    TETANUS VACCINE  03/01/2028    Lipid Panel  10/07/2028    Shingles Vaccine  Discontinued    Colonoscopy  Discontinued       Past Medical History:   Diagnosis Date    Anemia of chronic disease 10/22/2018    Colon polyps     Foot drop, left foot     Glaucoma     Hyperlipidemia     Thrombocytosis 10/2/2018    Urinary tract infection     Vaginal infection        Past Surgical History:   Procedure Laterality Date    COLONOSCOPY  08/09/2019    Mild diverticulosis of the desending colon and sigmoid colon. Polyp 12 mm in the hepatic flexure    EYE SURGERY Right     POLYPECTOMY         family history is not on file.    Social History     Tobacco Use    Smoking status: Never    Smokeless tobacco: Never   Substance Use Topics    Alcohol use: No     Alcohol/week: 0.0 standard drinks of alcohol    Drug use: No       Review of Systems   Constitutional:  Negative for fatigue and fever.   Respiratory:  Negative for cough and chest tightness.   "  Gastrointestinal:  Negative for abdominal pain, diarrhea and vomiting.   Endocrine: Negative for polydipsia and polyphagia.   Genitourinary:  Negative for difficulty urinating, dysuria and frequency.   Musculoskeletal:  Positive for arthralgias and myalgias. Negative for back pain, gait problem and joint swelling.   Skin:  Negative for rash.   Neurological:  Negative for seizures, weakness, numbness and headaches.   Psychiatric/Behavioral:  Negative for sleep disturbance.        Outpatient Encounter Medications as of 6/21/2024   Medication Sig Dispense Refill    estradioL (ESTRACE) 0.01 % (0.1 mg/gram) vaginal cream Place 1 g vaginally twice a week. 43 g 1    magnesium oxide 200 mg magnesium Tab Take 1 tablet by mouth.      pravastatin (PRAVACHOL) 20 MG tablet Take 1 tablet (20 mg total) by mouth once daily. 90 tablet 3    thiamine 100 MG tablet Take 100 mg by mouth once daily.      vitamin D (VITAMIN D3) 1000 units Tab Take 1,000 Units by mouth once daily.      [DISCONTINUED] LEVOXYL 50 mcg tablet Take 1 tablet (50 mcg total) by mouth before breakfast. 90 tablet 3    famotidine (PEPCID) 40 MG tablet Take 1 tablet (40 mg total) by mouth nightly. 90 tablet 3    LEVOXYL 50 mcg tablet Take 1 tablet (50 mcg total) by mouth before breakfast. 90 tablet 3     No facility-administered encounter medications on file as of 6/21/2024.        Review of patient's allergies indicates:   Allergen Reactions    Fosamax [alendronate] Other (See Comments)     Patient unsure but reports that she was told not to take medication       Physical Exam      Vital Signs  Temp: 98.1 °F (36.7 °C)  Temp Source: Temporal  Pulse: 64  SpO2: 96 %  BP: 132/84  BP Location: Left arm  Patient Position: Sitting  Pain Score: 0-No pain  Height and Weight  Height: 5' 1" (154.9 cm)  Weight: 64 kg (140 lb 15.8 oz)  BSA (Calculated - sq m): 1.66 sq meters  BMI (Calculated): 26.7  Weight in (lb) to have BMI = 25: 132]    Physical Exam  Vitals reviewed. " "  Constitutional:       Appearance: Normal appearance.   HENT:      Head: Normocephalic and atraumatic.      Mouth/Throat:      Mouth: Mucous membranes are moist.      Pharynx: Oropharynx is clear.   Eyes:      Extraocular Movements: Extraocular movements intact.      Conjunctiva/sclera: Conjunctivae normal.      Pupils: Pupils are equal, round, and reactive to light.   Cardiovascular:      Rate and Rhythm: Normal rate and regular rhythm.      Pulses: Normal pulses.      Heart sounds: Normal heart sounds.   Pulmonary:      Effort: Pulmonary effort is normal.      Breath sounds: Normal breath sounds. No wheezing or rales.   Abdominal:      General: Bowel sounds are normal.      Palpations: Abdomen is soft.   Musculoskeletal:         General: Tenderness present.      Cervical back: Normal range of motion.      Right lower leg: No edema (left foot drop).      Left lower leg: No edema.   Skin:     General: Skin is warm and dry.      Findings: No rash.   Neurological:      General: No focal deficit present.      Mental Status: She is alert and oriented to person, place, and time.      Sensory: No sensory deficit.   Psychiatric:         Mood and Affect: Mood normal.         Behavior: Behavior normal.          Laboratory:  CBC:  No results for input(s): "WBC", "RBC", "HGB", "HCT", "PLT", "MCV", "MCH", "MCHC" in the last 2160 hours.  CMP:  No results for input(s): "GLU", "CALCIUM", "ALBUMIN", "PROT", "NA", "K", "CO2", "CL", "BUN", "ALKPHOS", "ALT", "AST", "BILITOT" in the last 2160 hours.    Invalid input(s): "CREATININ"  URINALYSIS:  No results for input(s): "COLORU", "CLARITYU", "SPECGRAV", "PHUR", "PROTEINUA", "GLUCOSEU", "BILIRUBINCON", "BLOODU", "WBCU", "RBCU", "BACTERIA", "MUCUS", "NITRITE", "LEUKOCYTESUR", "UROBILINOGEN", "HYALINECASTS" in the last 2160 hours.   LIPIDS:  No results for input(s): "TSH", "HDL", "CHOL", "TRIG", "LDLCALC", "CHOLHDL", "NONHDLCHOL", "TOTALCHOLEST" in the last 2160 hours.  TSH:  No results " "for input(s): "TSH" in the last 2160 hours.  A1C:  No results for input(s): "HGBA1C" in the last 2160 hours.    Radiology:      Assessment/Plan     Joanne Daniel is a 84 y.o.female with:    1. Left foot drop  -now with worsening ankle pain  -refer to podiatry for further evaluation    2. GERD  -trial of famotidine 40mg nightly    3. Acquired hypothyroidism  -last TFT WNL (03/2023)  -due for repeat TFT, ordered  -c/w current regimen  -followed by endocrinologist     4. Essential hypertension  -well controlled off medications    5. Pure hypercholesterolemia  -currently not on any medications  -still reluctant to start  -counseled extensively on risks of not taking ( stroke, MI, PAD amongst others)  -c/w life style modifications      -Continue current medications and maintain follow up with specialists.      Patient verbalizes understanding and agrees with current treatment plan.      Dr Ellen Lino MD  [facility] [place]    Time spent: 40 minutes in face to face discussion concerning diagnosis, prognosis, review of lab and test results, benefits of treatment as well as management of disease, counseling of patient and coordination of care between various health care providers.  Greater than half the time spent was used for coordination of care and counseling of patient.      "

## 2024-07-24 DIAGNOSIS — M79.645 PAIN IN LEFT FINGER(S): Primary | ICD-10-CM

## 2024-09-09 ENCOUNTER — OFFICE VISIT (OUTPATIENT)
Dept: PODIATRY | Facility: CLINIC | Age: 84
End: 2024-09-09
Payer: MEDICARE

## 2024-09-09 VITALS — WEIGHT: 140.13 LBS | HEIGHT: 61 IN | BODY MASS INDEX: 26.46 KG/M2 | RESPIRATION RATE: 18 BRPM

## 2024-09-09 DIAGNOSIS — M21.372 LEFT FOOT DROP: ICD-10-CM

## 2024-09-09 DIAGNOSIS — M79.672 FOOT PAIN, BILATERAL: Primary | ICD-10-CM

## 2024-09-09 DIAGNOSIS — G62.9 NEUROPATHY: Primary | ICD-10-CM

## 2024-09-09 DIAGNOSIS — R20.2 PARESTHESIA OF FOOT, BILATERAL: ICD-10-CM

## 2024-09-09 DIAGNOSIS — M79.671 FOOT PAIN, BILATERAL: Primary | ICD-10-CM

## 2024-09-09 PROCEDURE — 1126F AMNT PAIN NOTED NONE PRSNT: CPT | Mod: HCNC,CPTII,S$GLB, | Performed by: PODIATRIST

## 2024-09-09 PROCEDURE — 3288F FALL RISK ASSESSMENT DOCD: CPT | Mod: HCNC,CPTII,S$GLB, | Performed by: PODIATRIST

## 2024-09-09 PROCEDURE — 99999 PR PBB SHADOW E&M-EST. PATIENT-LVL III: CPT | Mod: PBBFAC,HCNC,, | Performed by: PODIATRIST

## 2024-09-09 PROCEDURE — 99203 OFFICE O/P NEW LOW 30 MIN: CPT | Mod: HCNC,S$GLB,, | Performed by: PODIATRIST

## 2024-09-09 PROCEDURE — 1101F PT FALLS ASSESS-DOCD LE1/YR: CPT | Mod: HCNC,CPTII,S$GLB, | Performed by: PODIATRIST

## 2024-09-09 RX ORDER — CAPSAICIN 0 G/G
1 CREAM TOPICAL 2 TIMES DAILY
Qty: 56.6 G | Refills: 1 | Status: SHIPPED | OUTPATIENT
Start: 2024-09-09

## 2024-09-09 NOTE — PROGRESS NOTES
[facility] - PODIATRY  [address] [facility]  [address]  [place] [address]  Dept: [phone]  Dept Fax: [phone]    Don Tate Jr., DPM     Assessment:   MDM    Coding  1. Neuropathy  EMG W/ ULTRASOUND AND NERVE CONDUCTION TEST 2 Extremities    capsaicin 0.1 % Crea    Ambulatory referral/consult to Physical/Occupational Therapy      2. Left foot drop  Ambulatory referral/consult to Podiatry    EMG W/ ULTRASOUND AND NERVE CONDUCTION TEST 2 Extremities    Ambulatory referral/consult to Physical/Occupational Therapy      3. Paresthesia of foot, bilateral  capsaicin 0.1 % Crea          Plan:     Procedures  1. Neuropathy  Overview:  - 2015 evaluated by Neurology  - hereditary    Orders:  -     EMG W/ ULTRASOUND AND NERVE CONDUCTION TEST 2 Extremities; Future  -     capsaicin 0.1 % Crea; Apply 1 application  topically 2 (two) times daily.  Dispense: 56.6 g; Refill: 1  -     Ambulatory referral/consult to Physical/Occupational Therapy; Future; Expected date: 09/16/2024    2. Left foot drop  Overview:  - 2015 started with neuropathy  - evaluated by Neurology  - completed PT and has orthotic but does not wear it    Orders:  -     Ambulatory referral/consult to Podiatry  -     EMG W/ ULTRASOUND AND NERVE CONDUCTION TEST 2 Extremities; Future  -     Ambulatory referral/consult to Physical/Occupational Therapy; Future; Expected date: 09/16/2024    3. Paresthesia of foot, bilateral  -     capsaicin 0.1 % Crea; Apply 1 application  topically 2 (two) times daily.  Dispense: 56.6 g; Refill: 1      Joanne was seen today for numbness.    Diagnoses and all orders for this visit:    Neuropathy  -     EMG W/ ULTRASOUND AND NERVE CONDUCTION TEST 2 Extremities; Future  -     capsaicin 0.1 % Crea; Apply 1 application  topically 2 (two) times daily.  -     Ambulatory referral/consult to Physical/Occupational Therapy; Future    Left foot drop  -     Ambulatory referral/consult to Podiatry  -      EMG W/ ULTRASOUND AND NERVE CONDUCTION TEST 2 Extremities; Future  -     Ambulatory referral/consult to Physical/Occupational Therapy; Future    Paresthesia of foot, bilateral  -     capsaicin 0.1 % Crea; Apply 1 application  topically 2 (two) times daily.        -pt seen, evaluated, and managed  -dx discussed in detail. All questions/concerns addressed  -all tx options discussed. All alternatives, risks, benefits of all txs discussed  -We discussed conservative care options possible including but not limited to shoe wear and/or padding, bracing/strapping, at home ROM, formal PT, medical therapy, injection therapy  - The utilization of NSAIDs can be considered but their benefit has to be tempered against the risk of GI/ concerns  - A steroid injection can be undertaken.  We did discuss the potential mechanism of action of this shot.  Understanding that multiple injections at the same anatomic site do have deleterious effects on the soft tissue.  Generic risks include: steroid flare (advised to ice if necessary), skin hypo-pgimentation (which can be permanent and unsightly), elevation of blood sugar, subcutaneous atrophy (can be permanent) and infection.   -XR/imaging reviewed by me: agree with read  -labs reviewed by me: ok for topical pain cream  -implemented icing/stretching regimen  -EMG ordered to better characterize    -rxs dispensed: topical pain rx  -referrals: PT  -WB: wbat      Follow up in about 8 weeks (around 11/4/2024).    Subjective:      Patient ID: Joanne Daniel is a 84 y.o. female.    Chief Complaint:   Chief Complaint   Patient presents with    Numbness       CC - foot pain: patient presents to the podiatry clinic  with complaint of  bilateral foot pain. Onset of the symptoms was several years ago. Precipitating event: unk. Current symptoms include:numbness/tingling, burning pain of feet, weakness and worsening symptoms after a period of activity. Aggravating factors: walking and certain  shoegear. Symptoms have gradually worsened. Patient has had no prior foot problems. Evaluation to date: none. Treatment to date: none. Patients rates pain 5/10 on pain scale.      HPI    Last Podiatry Enc: Visit date not found  Last Enc w/ Me: Visit date not found    Outside reports reviewed: historical medical records.  Family hx: as below  Past Medical History:   Diagnosis Date    Anemia of chronic disease 10/22/2018    Colon polyps     Foot drop, left foot     Glaucoma     Hyperlipidemia     Thrombocytosis 10/2/2018    Urinary tract infection     Vaginal infection      Past Surgical History:   Procedure Laterality Date    COLONOSCOPY  08/09/2019    Mild diverticulosis of the desending colon and sigmoid colon. Polyp 12 mm in the hepatic flexure    EYE SURGERY Right     POLYPECTOMY       Family History   Problem Relation Name Age of Onset    Heart disease Neg Hx      Cancer Neg Hx      Kidney disease Neg Hx      Breast cancer Neg Hx      Ovarian cancer Neg Hx      Colon cancer Neg Hx       Current Outpatient Medications   Medication Sig Dispense Refill    estradioL (ESTRACE) 0.01 % (0.1 mg/gram) vaginal cream Place 1 g vaginally twice a week. 43 g 1    famotidine (PEPCID) 40 MG tablet Take 1 tablet (40 mg total) by mouth nightly. 90 tablet 3    LEVOXYL 50 mcg tablet Take 1 tablet (50 mcg total) by mouth before breakfast. 90 tablet 3    magnesium oxide 200 mg magnesium Tab Take 1 tablet by mouth.      pravastatin (PRAVACHOL) 20 MG tablet Take 1 tablet (20 mg total) by mouth once daily. 90 tablet 3    thiamine 100 MG tablet Take 100 mg by mouth once daily.      vitamin D (VITAMIN D3) 1000 units Tab Take 1,000 Units by mouth once daily.      capsaicin 0.1 % Crea Apply 1 application  topically 2 (two) times daily. 56.6 g 1     No current facility-administered medications for this visit.     Review of patient's allergies indicates:   Allergen Reactions    Fosamax [alendronate] Other (See Comments)     Patient unsure but  "reports that she was told not to take medication     Social History     Socioeconomic History    Marital status:      Spouse name: Antonino    Number of children: 3   Tobacco Use    Smoking status: Never    Smokeless tobacco: Never   Substance and Sexual Activity    Alcohol use: No     Alcohol/week: 0.0 standard drinks of alcohol    Drug use: No    Sexual activity: Yes     Partners: Male   Social History Narrative    Lives with her  in [place]. 3 children and 5 grandchildren. Hinduism. Born-again Protestant     Social Determinants of Health     Stress: No Stress Concern Present (1/17/2020)    [employer] - Occupational Stress Questionnaire     Feeling of Stress : Only a little       ROS    REVIEW OF SYSTEMS: Negative as documented below as well as positive findings in bold.       Constitutional  Respiratory  Gastrointestinal  Skin   - Fever - Cough - Heartburn - Rash   - Chills - Spit blood - Nausea - Itching   - Weight Loss - Shortness of breath - Vomiting - Nail pain   - Malaise/Fatigue - Wheezing - Abdominal Pain  Wound/Ulcer   - Weight Gain   - Blood in Stool  Poor wound healing       - Diarrhea          Cardiovascular  Genitourinary  Neurological  HEENT   - Chest Pain - Dysuria - Burning Sensation of feet - Headache   - Palpitations - Hematuria - Tingling / Paresthesia - Congestion   - Pain at night in legs - Flank Pain - Dizziness - Sore Throat   - Cramping   - Tremor - Blurred Vision   - Leg Swelling   - Sensory Change - Double Vision   - Dizzy when standing   - Speech Change - Eye Redness       - Focal Weakness - Dry Eyes       - Loss of Consciousness          Endocrine  Musculoskeletal  Psychiatric   - Cold intolerance - Muscle Pain - Depression   - Heat intolerance - Neck Pain - Insomnia   - Anemia - Joint Pain - Memory Loss   -  Easy bruising, bleeding - Heel pain - Anxiety      Toe Pain        Leg/Ankle/Foot Pain         Objective:     Resp 18   Ht 5' 1" (1.549 m)  " " Wt 63.6 kg (140 lb 1.6 oz)   LMP  (LMP Unknown)   BMI 26.47 kg/m²   Vitals:    09/09/24 1048   Resp: 18   Weight: 63.6 kg (140 lb 1.6 oz)   Height: 5' 1" (1.549 m)   PainSc: 0-No pain       Physical Exam    General Appearance:   Patient appears well developed, well nourished  Patient appears stated age    Psychiatric:   Patient is oriented to time, place, and person.  Patient has appropriate mood and affect    Neck:  Trachea Midline  No visible masses    Respiratory/Ears:  No distress or labored breathing.  Able to differentiate between normal talking voice and whisper.  Able to follow commands    Eyes:  Visual Acuity intact  Lids and conjunctivae normal. No discoloration noted.    Foot Exam  Physical Exam  Ortho Exam  Ortho/SPM Exam  Physical Exam  Foot/Ankle Musculoskeletal Exam    B/l LE exam con't:  V:  DP 2/4, PT 2/4   CRT< 3s to all digits tested   Tibial and popliteal lymph nodes are w/o abnormality   Edema: absent, varicosities: present    N:  Patient displays normal ankle reflexes   SILT in SP/DP/T/Elmer/Saph distributions    Ortho: +Motor EHL/FHL/TA/GA, MMT 4/5 all muscle groups   There is mild decreased ROM at the foot/ankle joints: pain is not present, crepitus is not present  Compartments soft/compressible. No pain on passive stretch of big toe. No calf  Pain.    Derm:  skin intact, skin warm and dry, skin without ulcers or lesions, skin without induration, nails normal, no erythema and no ecchymosis    Imaging / Labs:      No results found.      Note: This was dictated using a computer transcription program. Although proofread, it may contain computer transcription errors and phonetic errors. Other human proofreading errors may also exist. Corrections may be performed at a later time. Please contact us for any clarification if needed.    Don Tate DPM  [facility]    "

## 2024-10-17 ENCOUNTER — OFFICE VISIT (OUTPATIENT)
Dept: UROLOGY | Facility: CLINIC | Age: 84
End: 2024-10-17
Payer: MEDICARE

## 2024-10-17 VITALS
BODY MASS INDEX: 26.65 KG/M2 | WEIGHT: 141.13 LBS | DIASTOLIC BLOOD PRESSURE: 72 MMHG | HEART RATE: 65 BPM | SYSTOLIC BLOOD PRESSURE: 154 MMHG | HEIGHT: 61 IN

## 2024-10-17 DIAGNOSIS — R35.1 NOCTURIA: ICD-10-CM

## 2024-10-17 DIAGNOSIS — N95.2 ATROPHIC VAGINITIS: ICD-10-CM

## 2024-10-17 DIAGNOSIS — N36.2 URETHRAL CARUNCLE: ICD-10-CM

## 2024-10-17 DIAGNOSIS — N39.3 SUI (STRESS URINARY INCONTINENCE, FEMALE): ICD-10-CM

## 2024-10-17 DIAGNOSIS — R35.0 URINARY FREQUENCY: Primary | ICD-10-CM

## 2024-10-17 LAB
BILIRUB UR QL STRIP: NEGATIVE
CLARITY UR REFRACT.AUTO: CLEAR
COLOR UR AUTO: YELLOW
GLUCOSE UR QL STRIP: NEGATIVE
HGB UR QL STRIP: NEGATIVE
KETONES UR QL STRIP: NEGATIVE
LEUKOCYTE ESTERASE UR QL STRIP: NEGATIVE
NITRITE UR QL STRIP: NEGATIVE
PH UR STRIP: 6 [PH] (ref 5–8)
PROT UR QL STRIP: NEGATIVE
SP GR UR STRIP: 1.01 (ref 1–1.03)
URN SPEC COLLECT METH UR: NORMAL

## 2024-10-17 PROCEDURE — 1126F AMNT PAIN NOTED NONE PRSNT: CPT | Mod: HCNC,CPTII,S$GLB, | Performed by: NURSE PRACTITIONER

## 2024-10-17 PROCEDURE — 99214 OFFICE O/P EST MOD 30 MIN: CPT | Mod: HCNC,S$GLB,, | Performed by: NURSE PRACTITIONER

## 2024-10-17 PROCEDURE — 81003 URINALYSIS AUTO W/O SCOPE: CPT | Mod: HCNC | Performed by: NURSE PRACTITIONER

## 2024-10-17 PROCEDURE — 1159F MED LIST DOCD IN RCRD: CPT | Mod: HCNC,CPTII,S$GLB, | Performed by: NURSE PRACTITIONER

## 2024-10-17 PROCEDURE — 1160F RVW MEDS BY RX/DR IN RCRD: CPT | Mod: HCNC,CPTII,S$GLB, | Performed by: NURSE PRACTITIONER

## 2024-10-17 PROCEDURE — 87086 URINE CULTURE/COLONY COUNT: CPT | Mod: HCNC | Performed by: NURSE PRACTITIONER

## 2024-10-17 PROCEDURE — 99999 PR PBB SHADOW E&M-EST. PATIENT-LVL III: CPT | Mod: PBBFAC,HCNC,, | Performed by: NURSE PRACTITIONER

## 2024-10-17 PROCEDURE — 3288F FALL RISK ASSESSMENT DOCD: CPT | Mod: HCNC,CPTII,S$GLB, | Performed by: NURSE PRACTITIONER

## 2024-10-17 PROCEDURE — 1101F PT FALLS ASSESS-DOCD LE1/YR: CPT | Mod: HCNC,CPTII,S$GLB, | Performed by: NURSE PRACTITIONER

## 2024-10-17 RX ORDER — ESTRADIOL 0.1 MG/G
1 CREAM VAGINAL
Qty: 43 G | Refills: 1 | Status: SHIPPED | OUTPATIENT
Start: 2024-10-17 | End: 2025-10-17

## 2024-10-17 NOTE — PATIENT INSTRUCTIONS
U/A and urine cx today  Continue using estrace vaginal cream as prescribed (twice weekly) for vaginal atrophy and urethra caruncle. REFILLED  Follow-up in 1 year or sooner if needed.

## 2024-10-17 NOTE — PROGRESS NOTES
Subjective:       Patient ID: Joanne Daniel is a 84 y.o. female.    Chief Complaint: Annual Exam    Patient is a 85 yo WF who is here today for her annual urology exam and evaluation of her urethral caruncle. She reports urinary frequency with a small amount if urine voided sometimes. She is currently using estrace vaginal cream once a week instead of twice weekly.      Follow-up  This is a chronic (urethral caruncle) problem. The current episode started more than 1 year ago. The problem occurs daily. The problem has been unchanged. Associated symptoms include fatigue, urinary symptoms and weakness. Pertinent negatives include no abdominal pain, change in bowel habit, chills, fever, nausea, swollen glands or vomiting. Nothing aggravates the symptoms. Treatments tried: estrace cream.     Review of Systems   Constitutional:  Positive for fatigue. Negative for chills and fever.   Gastrointestinal:  Negative for abdominal pain, change in bowel habit, nausea and vomiting.   Genitourinary:  Positive for bladder incontinence (with coughing and sneezing) and nocturia. Negative for decreased urine volume, difficulty urinating, dysuria, flank pain, frequency, hematuria and urgency.   Neurological:  Positive for weakness.         Objective:      Physical Exam  Vitals and nursing note reviewed.   Constitutional:       General: She is not in acute distress.     Appearance: She is well-developed. She is not ill-appearing.   HENT:      Head: Normocephalic and atraumatic.   Eyes:      Pupils: Pupils are equal, round, and reactive to light.   Cardiovascular:      Rate and Rhythm: Normal rate.   Pulmonary:      Effort: Pulmonary effort is normal. No respiratory distress.   Abdominal:      Palpations: Abdomen is soft.      Tenderness: There is no abdominal tenderness.   Musculoskeletal:         General: Normal range of motion.      Cervical back: Normal range of motion.   Skin:     General: Skin is warm and dry.   Neurological:       Mental Status: She is alert and oriented to person, place, and time.      Coordination: Coordination normal.   Psychiatric:         Mood and Affect: Mood normal.         Behavior: Behavior normal.         Thought Content: Thought content normal.         Judgment: Judgment normal.         Assessment:       Problem List Items Addressed This Visit          Renal/    Urethral caruncle    Relevant Orders    Urine culture    Urinalysis    Atrophic vaginitis    Relevant Medications    estradioL (ESTRACE) 0.01 % (0.1 mg/gram) vaginal cream    Other Relevant Orders    Urine culture    Urinalysis    Nocturia    Relevant Orders    Urine culture    Urinalysis    MARIO (stress urinary incontinence, female)    Relevant Orders    Urine culture    Urinalysis     Other Visit Diagnoses       Urinary frequency    -  Primary    Relevant Orders    Urine culture    Urinalysis            Plan:           Joanne was seen today for annual exam.    Diagnoses and all orders for this visit:    Urinary frequency  -     Urine culture  -     Urinalysis    Atrophic vaginitis  -     estradioL (ESTRACE) 0.01 % (0.1 mg/gram) vaginal cream; Place 1 g vaginally twice a week.  -     Urine culture  -     Urinalysis    Urethral caruncle  -     Urine culture  -     Urinalysis    MARIO (stress urinary incontinence, female)  -     Urine culture  -     Urinalysis    Nocturia  -     Urine culture  -     Urinalysis    Other order  Continue using estrace vaginal cream as prescribed (twice weekly) for vaginal atrophy and urethra caruncle. REFILLED    Follow-up in 1 year or sooner if needed.     Anabel Berumen, DNP

## 2024-10-18 LAB
BACTERIA UR CULT: NORMAL
BACTERIA UR CULT: NORMAL

## 2024-10-21 ENCOUNTER — TELEPHONE (OUTPATIENT)
Dept: UROLOGY | Facility: CLINIC | Age: 84
End: 2024-10-21
Payer: MEDICARE

## 2024-10-21 DIAGNOSIS — E03.9 ACQUIRED HYPOTHYROIDISM: ICD-10-CM

## 2024-10-21 RX ORDER — LEVOTHYROXINE SODIUM 50 UG/1
50 TABLET ORAL
Qty: 90 TABLET | Refills: 3 | Status: SHIPPED | OUTPATIENT
Start: 2024-10-21 | End: 2025-10-21

## 2024-10-21 NOTE — TELEPHONE ENCOUNTER
----- Message from Anabel Berumen DNP sent at 10/21/2024  4:34 PM CDT -----  Please inform patient via telephone that her urine cx showed some bacteria but none in predominance to diagnose a UTI. This means urine sample was contaminated. Repeat urine cx only if urinary symptoms are present and re-educate patient on clean catch mid-stream urine specimen collection. Thanks.

## 2024-10-21 NOTE — TELEPHONE ENCOUNTER
No care due was identified.  [facility] Embedded Care Due Messages. Reference number: [id number].   10/21/2024 10:51:40 AM CDT

## 2024-10-21 NOTE — TELEPHONE ENCOUNTER
----- Message from Annmarie sent at 10/19/2024  8:20 AM CDT -----  Type:  RX Refill Request    Who Called: Pt   Refill or New Rx: Refill   RX Name and Strength: LEVOXYL 50 mcg tablet  Preferred Pharmacy with phone number:  [id number] - [place] - [address]  [address] [place] [address]  Phone: [phone] Fax: [phone]  Local or Mail Order: Local   Ordering Provider: [place]   Would the patient rather a call back or a response via [facility]? Call back   Best Call Back Number: [phone]  Additional Information: Please be advised, pt states her next appt isn't until 01/10/2025 and she is running out of medication and requesting a refill

## 2024-11-01 ENCOUNTER — TELEPHONE (OUTPATIENT)
Dept: PHYSICAL MEDICINE AND REHAB | Facility: CLINIC | Age: 84
End: 2024-11-01
Payer: MEDICARE

## 2024-11-04 ENCOUNTER — TELEPHONE (OUTPATIENT)
Dept: PHYSICAL MEDICINE AND REHAB | Facility: CLINIC | Age: 84
End: 2024-11-04
Payer: MEDICARE

## 2024-11-05 ENCOUNTER — TELEPHONE (OUTPATIENT)
Dept: PHYSICAL MEDICINE AND REHAB | Facility: CLINIC | Age: 84
End: 2024-11-05
Payer: MEDICARE

## 2024-11-05 NOTE — TELEPHONE ENCOUNTER
Spoke with patient daughter regarding scheduling patient for EMG test.   Patient daughter stated she spoke with Dr. Tate and he decided to cancel the EMG test at this time because her mother had one before.  I notified patient daughter I will remove her mother from scheduling list at this time.  Patient daughter express understanding.

## 2025-01-06 ENCOUNTER — TELEPHONE (OUTPATIENT)
Dept: FAMILY MEDICINE | Facility: CLINIC | Age: 85
End: 2025-01-06
Payer: MEDICARE

## 2025-01-06 NOTE — TELEPHONE ENCOUNTER
Pt informed that there are no orders at this time and Dr. Sanchez is out until 01/13/2025. Pt is ok with waiting until that day to verify if labs are needed.

## 2025-01-06 NOTE — TELEPHONE ENCOUNTER
----- Message from Italia sent at 1/6/2025 10:16 AM CST -----  Type:  Orders request    Who Called: Patient  Best Call Back Number: [phone]  Additional Information: Patient is requesting orders for Labs prior to her 1/23

## 2025-01-13 ENCOUNTER — TELEPHONE (OUTPATIENT)
Dept: FAMILY MEDICINE | Facility: CLINIC | Age: 85
End: 2025-01-13
Payer: MEDICARE

## 2025-01-13 DIAGNOSIS — R73.03 PREDIABETES: ICD-10-CM

## 2025-01-13 DIAGNOSIS — E78.00 PURE HYPERCHOLESTEROLEMIA: ICD-10-CM

## 2025-01-13 DIAGNOSIS — E03.9 ACQUIRED HYPOTHYROIDISM: Primary | ICD-10-CM

## 2025-01-13 DIAGNOSIS — I10 ESSENTIAL HYPERTENSION: ICD-10-CM

## 2025-01-13 RX ORDER — LEVOTHYROXINE SODIUM 50 UG/1
50 TABLET ORAL
Qty: 90 TABLET | Refills: 3 | Status: SHIPPED | OUTPATIENT
Start: 2025-01-13 | End: 2026-01-13

## 2025-01-13 NOTE — TELEPHONE ENCOUNTER
Upon calling pt to set up labs pt is asking if she is on generic medication for her thyroid and if not can a new prescription be sent for generic due to cost.

## 2025-02-22 DIAGNOSIS — Z00.00 ENCOUNTER FOR MEDICARE ANNUAL WELLNESS EXAM: ICD-10-CM

## 2025-03-12 ENCOUNTER — OFFICE VISIT (OUTPATIENT)
Dept: FAMILY MEDICINE | Facility: CLINIC | Age: 85
End: 2025-03-12
Payer: MEDICARE

## 2025-03-12 VITALS
HEIGHT: 61 IN | OXYGEN SATURATION: 98 % | WEIGHT: 143.06 LBS | DIASTOLIC BLOOD PRESSURE: 60 MMHG | HEART RATE: 66 BPM | SYSTOLIC BLOOD PRESSURE: 108 MMHG | BODY MASS INDEX: 27.01 KG/M2

## 2025-03-12 DIAGNOSIS — E78.00 PURE HYPERCHOLESTEROLEMIA: ICD-10-CM

## 2025-03-12 DIAGNOSIS — Z00.00 HEALTHCARE MAINTENANCE: ICD-10-CM

## 2025-03-12 DIAGNOSIS — E03.9 HYPOTHYROIDISM, UNSPECIFIED TYPE: Primary | ICD-10-CM

## 2025-03-12 DIAGNOSIS — J30.9 ALLERGIC RHINITIS, UNSPECIFIED SEASONALITY, UNSPECIFIED TRIGGER: ICD-10-CM

## 2025-03-12 PROCEDURE — 1160F RVW MEDS BY RX/DR IN RCRD: CPT | Mod: HCNC,CPTII,S$GLB, | Performed by: STUDENT IN AN ORGANIZED HEALTH CARE EDUCATION/TRAINING PROGRAM

## 2025-03-12 PROCEDURE — 1101F PT FALLS ASSESS-DOCD LE1/YR: CPT | Mod: HCNC,CPTII,S$GLB, | Performed by: STUDENT IN AN ORGANIZED HEALTH CARE EDUCATION/TRAINING PROGRAM

## 2025-03-12 PROCEDURE — 1159F MED LIST DOCD IN RCRD: CPT | Mod: HCNC,CPTII,S$GLB, | Performed by: STUDENT IN AN ORGANIZED HEALTH CARE EDUCATION/TRAINING PROGRAM

## 2025-03-12 PROCEDURE — 99999 PR PBB SHADOW E&M-EST. PATIENT-LVL III: CPT | Mod: PBBFAC,HCNC,, | Performed by: STUDENT IN AN ORGANIZED HEALTH CARE EDUCATION/TRAINING PROGRAM

## 2025-03-12 PROCEDURE — 1126F AMNT PAIN NOTED NONE PRSNT: CPT | Mod: HCNC,CPTII,S$GLB, | Performed by: STUDENT IN AN ORGANIZED HEALTH CARE EDUCATION/TRAINING PROGRAM

## 2025-03-12 PROCEDURE — 99214 OFFICE O/P EST MOD 30 MIN: CPT | Mod: HCNC,S$GLB,, | Performed by: STUDENT IN AN ORGANIZED HEALTH CARE EDUCATION/TRAINING PROGRAM

## 2025-03-12 PROCEDURE — 3288F FALL RISK ASSESSMENT DOCD: CPT | Mod: HCNC,CPTII,S$GLB, | Performed by: STUDENT IN AN ORGANIZED HEALTH CARE EDUCATION/TRAINING PROGRAM

## 2025-03-12 RX ORDER — KETOTIFEN FUMARATE 0.35 MG/ML
1 SOLUTION/ DROPS OPHTHALMIC 2 TIMES DAILY
Qty: 10 ML | Refills: 0 | Status: SHIPPED | OUTPATIENT
Start: 2025-03-12 | End: 2026-03-12

## 2025-03-12 RX ORDER — BENZONATATE 100 MG/1
100 CAPSULE ORAL 3 TIMES DAILY PRN
Qty: 60 CAPSULE | Refills: 0 | Status: SHIPPED | OUTPATIENT
Start: 2025-03-12 | End: 2025-04-01

## 2025-03-12 RX ORDER — MINERAL OIL
180 ENEMA (ML) RECTAL DAILY
Qty: 30 TABLET | Refills: 0 | Status: SHIPPED | OUTPATIENT
Start: 2025-03-12 | End: 2025-04-11

## 2025-03-12 RX ORDER — FLUTICASONE PROPIONATE 50 MCG
2 SPRAY, SUSPENSION (ML) NASAL 2 TIMES DAILY
Qty: 18.2 ML | Refills: 0 | Status: SHIPPED | OUTPATIENT
Start: 2025-03-12

## 2025-03-12 NOTE — PROGRESS NOTES
Patient ID: Joanne Daniel is a 84 y.o. female.    Chief Complaint: Allergic Rhinitis  and Establish Care    History of Present Illness    CHIEF COMPLAINT:  Joanne presents today for thyroid check and allergy symptoms.    ALLERGIC RHINITIS:  She reports experiencing allergy symptoms for a few weeks, including runny nose, nasal congestion, eye itching, and persistent cough. She describes multiple sneezing episodes, characterized by sneezing 4 times in rapid succession. She tried Zyrtec but experienced significant drowsiness requiring prolonged sleep the following day.    THYROID DISORDER:  She has a history of thyroid condition, previously managed by a specialist in [place] before transferring care to the current clinic. She continues Synthroid 50 mcg nightly. Thyroid function was checked on January 14th. She reports thinning hair in the posterior scalp region, noticed when styling hair in a ponytail.      ROS:  General: -fever, -chills, -fatigue, -weight gain, -weight loss  Eyes: -vision changes, -redness, -discharge  ENT: -ear pain, +nasal congestion, -sore throat  Cardiovascular: -chest pain, -palpitations, -lower extremity edema  Respiratory: +cough, -shortness of breath  Gastrointestinal: -abdominal pain, -nausea, -vomiting, -diarrhea, -constipation, -blood in stool  Genitourinary: -dysuria, -hematuria, -frequency  Musculoskeletal: -joint pain, -muscle pain  Skin: -rash, -lesion  Neurological: -headache, -dizziness, -numbness, -tingling  Psychiatric: -anxiety, -depression, -sleep difficulty  Allergic: +allergic reactions, +frequent sneezing         Physical Exam    General: No acute distress. Well-developed. Well-nourished.  Eyes: EOMI. Sclerae anicteric.  HENT: Normocephalic. Atraumatic. Nares patent. Moist oral mucosa.  Ears: Bilateral TMs clear. Bilateral EACs clear.  Cardiovascular: Regular rate. Regular rhythm. No murmurs. No rubs. No gallops. Normal S1, S2.  Respiratory: Normal respiratory effort. Clear to  auscultation bilaterally. No rales. No rhonchi. No wheezing.  Abdomen: Soft. Non-tender. Non-distended. Normoactive bowel sounds.  Musculoskeletal: No  obvious deformity.  Extremities: No lower extremity edema.  Neurological: Alert & oriented x3. No slurred speech. Normal gait.  Psychiatric: Normal mood. Normal affect. Good insight. Good judgment.  Skin: Warm. Dry. No rash.         Assessment & Plan    ALLERGIC RHINITIS:  - Assessed the patient's symptoms of rhinorrhea, otic congestion, ocular pruritus, and cough as likely due to allergies.  - Prescribed Flonase nasal spray: 2 puffs in each nostril twice daily.  - Prescribed antihistamine eye drops: 1 drop in each eye twice daily.  - Prescribed Allegra: Take 1 tablet nightly.  - Prescribed Tessalon perles: Use as needed for cough.  - Recommend saline nasal spray: Use as needed.  - Instructed the patient to contact the office if allergy symptoms do not improve with prescribed treatments.    HYPOTHYROIDISM:  - Reviewed January thyroid lab results, which were within normal limits.  - Continued current Synthroid dose of 50 mcg: Take 1 tablet nightly.  - Educated the patient about potential thyroid dysfunction symptoms to monitor, including tremors, palpitations, heat intolerance, diarrhea, and weight loss.  - Scheduled follow-up annually for thyroid evaluation, or every 6 months if preferred by the patient.  - Advised the patient to contact the office if experiencing symptoms of thyroid dysfunction.    HAIR LOSS:  - Evaluated the patient's complaint of thinning hair in the posterior scalp.  - Attributed hair thinning to age-related changes.  - Assessed the hair loss as not concerning unless it progresses significantly.    ADVERSE EFFECT OF ANTIHISTAMINES:  - Noted the patient's report of excessive somnolence after taking Zyrtec.  - Acknowledged the sedating effect of Zyrtec.  - Recommend Allegra as a less sedating alternative antihistamine.    GENERAL HEALTH:  -  Evaluated recent labs showing normal liver, kidney, cholesterol, and A1C levels.  - Joanne to ensure adequate hydration.       1. Hypothyroidism, unspecified type  Overview:    Lab Results   Component Value Date    TSH 1.100 08/22/2022    X7IYCCS 8.5 12/30/2020    FREET4 0.95 08/22/2022     - well controlled  - okay to change to generic levothyroxine 50 mcg daily    Orders:  -     TSH; Future; Expected date: 03/12/2026    2. Healthcare maintenance  -     Comprehensive Metabolic Panel; Future; Expected date: 03/12/2026    3. Pure hypercholesterolemia  Overview:  Lab Results   Component Value Date    LDLCALC 150.2 03/04/2022     - unable tolerate rosuvastatin 5 mg daily  - recommend goal LDL <140  - discussed recommendation for diet and cardiovascular exercise  - counseling on lifestyle modifications for risk factor reduction  - have discussed trying pravastatin 10 mg nightly with CoQ10 100 mg daily and uptitrate as tolerated  - patient declined    Orders:  -     Lipid Panel; Future; Expected date: 03/12/2026    4. Allergic rhinitis, unspecified seasonality, unspecified trigger  -     fluticasone propionate (FLONASE) 50 mcg/actuation nasal spray; 2 sprays (100 mcg total) by Each Nostril route 2 (two) times a day.  Dispense: 18.2 mL; Refill: 0  -     ketotifen (ALLERGY EYE, KETOTIFEN,) 0.025 % (0.035 %) ophthalmic solution; Place 1 drop into both eyes 2 (two) times daily.  Dispense: 10 mL; Refill: 0  -     fexofenadine (ALLEGRA) 180 MG tablet; Take 1 tablet (180 mg total) by mouth once daily.  Dispense: 30 tablet; Refill: 0    Other orders  -     benzonatate (TESSALON) 100 MG capsule; Take 1 capsule (100 mg total) by mouth 3 (three) times daily as needed for Cough.  Dispense: 60 capsule; Refill: 0              No follow-ups on file.    This note was generated with the assistance of ambient listening technology. Verbal consent was obtained by the patient and accompanying visitor(s) for the recording of patient  appointment to facilitate this note. I attest to having reviewed and edited the generated note for accuracy, though some syntax or spelling errors may persist. Please contact the author of this note for any clarification.

## 2025-03-30 ENCOUNTER — HOSPITAL ENCOUNTER (EMERGENCY)
Facility: HOSPITAL | Age: 85
Discharge: HOME | End: 2025-03-30
Attending: EMERGENCY MEDICINE
Payer: MEDICARE

## 2025-03-30 VITALS
WEIGHT: 143 LBS | OXYGEN SATURATION: 100 % | SYSTOLIC BLOOD PRESSURE: 158 MMHG | RESPIRATION RATE: 18 BRPM | BODY MASS INDEX: 27.02 KG/M2 | TEMPERATURE: 98 F | HEART RATE: 60 BPM | DIASTOLIC BLOOD PRESSURE: 70 MMHG

## 2025-03-30 DIAGNOSIS — R55 SYNCOPE: ICD-10-CM

## 2025-03-30 DIAGNOSIS — H81.399 PERIPHERAL VERTIGO, UNSPECIFIED LATERALITY: Primary | ICD-10-CM

## 2025-03-30 LAB
ABSOLUTE EOSINOPHIL (OHS): 0.32 K/UL
ABSOLUTE MONOCYTE (OHS): 0.42 K/UL (ref 0.3–1)
ABSOLUTE NEUTROPHIL COUNT (OHS): 4.73 K/UL (ref 1.8–7.7)
ALBUMIN SERPL BCP-MCNC: 3.5 G/DL (ref 3.5–5.2)
ALP SERPL-CCNC: 106 UNIT/L (ref 40–150)
ALT SERPL W/O P-5'-P-CCNC: 14 UNIT/L (ref 10–44)
ANION GAP (OHS): 8 MMOL/L (ref 8–16)
AST SERPL-CCNC: 17 UNIT/L (ref 11–45)
BASOPHILS # BLD AUTO: 0.05 K/UL
BASOPHILS NFR BLD AUTO: 0.7 %
BILIRUB SERPL-MCNC: 0.4 MG/DL (ref 0.1–1)
BUN SERPL-MCNC: 15 MG/DL (ref 8–23)
CALCIUM SERPL-MCNC: 8.8 MG/DL (ref 8.7–10.5)
CHLORIDE SERPL-SCNC: 113 MMOL/L (ref 95–110)
CO2 SERPL-SCNC: 20 MMOL/L (ref 23–29)
CREAT SERPL-MCNC: 0.7 MG/DL (ref 0.5–1.4)
ERYTHROCYTE [DISTWIDTH] IN BLOOD BY AUTOMATED COUNT: 13 % (ref 11.5–14.5)
GFR SERPLBLD CREATININE-BSD FMLA CKD-EPI: >60 ML/MIN/1.73/M2
GLUCOSE SERPL-MCNC: 106 MG/DL (ref 70–110)
HCT VFR BLD AUTO: 40.2 % (ref 37–48.5)
HGB BLD-MCNC: 13.3 GM/DL (ref 12–16)
IMM GRANULOCYTES # BLD AUTO: 0.02 K/UL (ref 0–0.04)
IMM GRANULOCYTES NFR BLD AUTO: 0.3 % (ref 0–0.5)
LYMPHOCYTES # BLD AUTO: 1.62 K/UL (ref 1–4.8)
MCH RBC QN AUTO: 30.5 PG (ref 27–50)
MCHC RBC AUTO-ENTMCNC: 33.1 G/DL (ref 32–36)
MCV RBC AUTO: 92 FL (ref 82–98)
NUCLEATED RBC (/100WBC) (OHS): 0 /100 WBC
PLATELET # BLD AUTO: 269 K/UL (ref 150–450)
PMV BLD AUTO: 10.5 FL (ref 9.2–12.9)
POTASSIUM SERPL-SCNC: 3.5 MMOL/L (ref 3.5–5.1)
PROT SERPL-MCNC: 7.2 GM/DL (ref 6–8.4)
RBC # BLD AUTO: 4.36 M/UL (ref 4–5.4)
RELATIVE EOSINOPHIL (OHS): 4.5 %
RELATIVE LYMPHOCYTE (OHS): 22.6 % (ref 18–48)
RELATIVE MONOCYTE (OHS): 5.9 % (ref 4–15)
RELATIVE NEUTROPHIL (OHS): 66 % (ref 38–73)
SODIUM SERPL-SCNC: 141 MMOL/L (ref 136–145)
T4 FREE SERPL-MCNC: NORMAL NG/DL
TSH SERPL-ACNC: 1.63 UIU/ML (ref 0.4–4)
WBC # BLD AUTO: 7.16 K/UL (ref 3.9–12.7)

## 2025-03-30 PROCEDURE — 85025 COMPLETE CBC W/AUTO DIFF WBC: CPT | Mod: HCNC | Performed by: EMERGENCY MEDICINE

## 2025-03-30 PROCEDURE — 93010 ELECTROCARDIOGRAM REPORT: CPT | Mod: HCNC,,, | Performed by: INTERNAL MEDICINE

## 2025-03-30 PROCEDURE — 99284 EMERGENCY DEPT VISIT MOD MDM: CPT | Mod: 25,HCNC

## 2025-03-30 PROCEDURE — 80053 COMPREHEN METABOLIC PANEL: CPT | Mod: HCNC | Performed by: EMERGENCY MEDICINE

## 2025-03-30 PROCEDURE — 84443 ASSAY THYROID STIM HORMONE: CPT | Mod: HCNC | Performed by: EMERGENCY MEDICINE

## 2025-03-30 PROCEDURE — 25000003 PHARM REV CODE 250: Mod: HCNC | Performed by: EMERGENCY MEDICINE

## 2025-03-30 PROCEDURE — 93005 ELECTROCARDIOGRAM TRACING: CPT | Mod: HCNC

## 2025-03-30 RX ORDER — MECLIZINE HYDROCHLORIDE 25 MG/1
25 TABLET ORAL
Status: COMPLETED | OUTPATIENT
Start: 2025-03-30 | End: 2025-03-30

## 2025-03-30 RX ORDER — ONDANSETRON 4 MG/1
4 TABLET, FILM COATED ORAL EVERY 8 HOURS PRN
Qty: 12 TABLET | Refills: 0 | Status: SHIPPED | OUTPATIENT
Start: 2025-03-30

## 2025-03-30 RX ORDER — ONDANSETRON 4 MG/1
4 TABLET, ORALLY DISINTEGRATING ORAL
Status: COMPLETED | OUTPATIENT
Start: 2025-03-30 | End: 2025-03-30

## 2025-03-30 RX ORDER — MECLIZINE HYDROCHLORIDE 25 MG/1
25 TABLET ORAL EVERY 6 HOURS PRN
Qty: 20 TABLET | Refills: 0 | Status: SHIPPED | OUTPATIENT
Start: 2025-03-30

## 2025-03-30 RX ADMIN — MECLIZINE HYDROCHLORIDE 25 MG: 25 TABLET ORAL at 12:03

## 2025-03-30 RX ADMIN — ONDANSETRON 4 MG: 4 TABLET, ORALLY DISINTEGRATING ORAL at 12:03

## 2025-03-30 NOTE — ED PROVIDER NOTES
"Encounter Date: 3/30/2025    SCRIBE #1 NOTE: I, Ronnie Chatterjee, am scribing for, and in the presence of,  Pardeep Fields MD. I have scribed the following portions of the note - Other sections scribed: HPI, ROS.       History     Chief Complaint   Patient presents with    Loss of Consciousness     Hx vertigo. Pt was sitting in a pew in [employer] when she "passed out" on her daughter's shoulder. Pt was out for approx 1 min. Ems reports systolic of 90 on scene. Pt is c/o nausea and dizziness. Given 4mg of zofran and IVF en route. Pt denies pain. Oriented x4. Cbg 128     Joanne Daniel is a 84 y.o. female, with a PMHx of HLD, vertigo, anemia of chronic disease, who presents to the ED with loss of consciousness today. Patient reports that she was at [employer] with her daughter when suddenly lost consciousness. Patient's states her daughter told her she slumped on her shoulder, but she came to a few seconds later. Patient reports prior to the syncopal episode she felt the need to use the bathroom, but felt unable to walk, and when she did go to the bathroom she had an episode of vomiting. Patient notes she felt better after vomiting, but still a bit dizzy. Patient describes the dizziness as the room "spinning". Patient also reports a "ringing" in her ear when she gets up to walk. Patient states she's had problems with her equilibrium before.No other exacerbating or alleviating factors. Patient denies cough, shortness of breath, chest pain, fever, chills, abdominal pain, diarrhea, dysuria, headaches, congestion, sore throat, arm or leg trouble, eye pain, ear pain, rash, or other associated symptoms. Patient has an allergy to fosamax.            The history is provided by the patient. No  was used.     Review of patient's allergies indicates:   Allergen Reactions    Fosamax [alendronate] Other (See Comments)     Patient unsure but reports that she was told not to take medication     Past Medical History: "   Diagnosis Date    Anemia of chronic disease 10/22/2018    Colon polyps     Foot drop, left foot     Glaucoma     Hyperlipidemia     Thrombocytosis 10/2/2018    Urinary tract infection     Vaginal infection      Past Surgical History:   Procedure Laterality Date    COLONOSCOPY  08/09/2019    Mild diverticulosis of the desending colon and sigmoid colon. Polyp 12 mm in the hepatic flexure    EYE SURGERY Right     POLYPECTOMY       Family History   Problem Relation Name Age of Onset    Heart disease Neg Hx      Cancer Neg Hx      Kidney disease Neg Hx      Breast cancer Neg Hx      Ovarian cancer Neg Hx      Colon cancer Neg Hx       Social History[1]  Review of Systems   Constitutional:  Negative for chills, diaphoresis and fever.   HENT:  Negative for congestion, ear pain and sore throat.    Eyes:  Negative for pain and visual disturbance.   Respiratory:  Negative for cough and shortness of breath.    Cardiovascular:  Negative for chest pain.   Gastrointestinal:  Positive for nausea and vomiting. Negative for abdominal pain and diarrhea.   Genitourinary:  Negative for dysuria.   Musculoskeletal:  Negative for myalgias.   Skin:  Negative for rash.   Neurological:  Positive for dizziness (Intermittent) and syncope. Negative for headaches.       Physical Exam     Initial Vitals [03/30/25 1144]   BP Pulse Resp Temp SpO2   (!) 145/65 62 18 98.1 °F (36.7 °C) 98 %      MAP       --         Physical Exam  The patient was examined specifically for the following:   General:No significant distress, Good color, Warm and dry. Head and neck:Scalp atraumatic, Neck supple. Neurological:Appropriate conversation, Gross motor deficits. Eyes:Conjugate gaze, Clear corneas. ENT: No epistaxis. Cardiac: Regular rate and rhythm, Grossly normal heart tones. Pulmonary: Wheezing, Rales. Gastrointestinal: Abdominal tenderness, Abdominal distention. Musculoskeletal: Extremity deformity, Apparent pain with range of motion of the joints. Skin:  Rash.   The findings on examination were normal except for the following:  Finger-to-nose heel-to-shin and gait are normal there is no disequilibrium.  The patient has no vision speech or swallowing deficits.  Lungs are clear.  The heart tones are normal.  The abdomen is soft.  Extremities are nontender there is no pain with range of motion any joints.  The scalp is atraumatic.  ED Course   Procedures  Labs Reviewed   COMPREHENSIVE METABOLIC PANEL - Abnormal       Result Value    Sodium 141      Potassium 3.5      Chloride 113 (*)     CO2 20 (*)     Glucose 106      BUN 15      Creatinine 0.7      Calcium 8.8      Protein Total 7.2      Albumin 3.5      Bilirubin Total 0.4            AST 17      ALT 14      Anion Gap 8      eGFR >60     CBC WITH DIFFERENTIAL - Normal    WBC 7.16      RBC 4.36      HGB 13.3      HCT 40.2      MCV 92      MCH 30.5      MCHC 33.1      RDW 13.0      Platelet Count 269      MPV 10.5      Nucleated RBC 0      Neut % 66.0      Lymph % 22.6      Mono % 5.9      Eos % 4.5      Basophil % 0.7      Imm Grans % 0.3      Neut # 4.73      Lymph # 1.62      Mono # 0.42      Eos # 0.32      Baso # 0.05      Imm Grans # 0.02     CBC W/ AUTO DIFFERENTIAL    Narrative:     The following orders were created for panel order CBC auto differential.  Procedure                               Abnormality         Status                     ---------                               -----------         ------                     CBC with Differential[[id number]]       Normal              Final result                 Please view results for these tests on the individual orders.   TSH    TSH 1.629      Free T4       CBC W/ AUTO DIFFERENTIAL    Narrative:     The following orders were created for panel order CBC Auto Differential.  Procedure                               Abnormality         Status                     ---------                               -----------         ------                     CBC  with Differential[[id number]]                                                        Please view results for these tests on the individual orders.   CBC WITH DIFFERENTIAL     EKG Readings: (Independently Interpreted)   This patient is in a normal sinus rhythm with a heart rate of 72.  There are no significant ST segment or T-wave changes.  The patient has left axis deviation.  There is poor R-wave progression across the precordium.  There is no definite evidence of acute myocardial infarction or malignant arrhythmia.       Imaging Results    None          Medications   ondansetron disintegrating tablet 4 mg (4 mg Oral Given 3/30/25 1248)   meclizine tablet 25 mg (25 mg Oral Given 3/30/25 1248)     Medical Decision Making  Amount and/or Complexity of Data Reviewed  Labs: ordered. Decision-making details documented in ED Course.  ECG/medicine tests: ordered and independent interpretation performed. Decision-making details documented in ED Course.    Risk  Prescription drug management.    Given the above, this patient presents emergency room with an episode of syncope while sitting next to her daughter at [facility].  The patient later head on the daughter shoulder briefly.  The patient reports that she felt like she needed to have a bowel movement before the event.  The patient also had some nausea and she did vomit 1 time.  The patient also reports some vague dizziness, like a disequilibrium and mild vertigo.  The patient has a vertigo and dizziness that could be reproduced going from supine to laying down during the physical examination.  The patient reports a history of peripheral vertigo.  She has been previously treated with meclizine.  She denies any speech vision or swallowing deficits.  Finger-to-nose heel-to-shin and gait were normal.  I carefully considered posterior circulation stroke and feel it is unlikely.  Given the urge to have a bowel movement followed by syncope at [facility], vagal syncope is considered.   The patient has a heart rate of 62 in the emergency room.  Her blood pressure is 144/67.  There are no orthostatic changes.  The patient has a normal white blood count.  A normal hemoglobin and hematocrit.  Chemistries are unremarkable.  The patient has a borderline low CO2 that is insignificant.  As well as a slightly elevated chloride, likely insignificant.  EKG fails to reveal evidence of injury ischemia or malignant arrhythmia.  The patient was monitored in the emergency room for more than an hour.  There was no ectopy.  The patient was treated with meclizine and Zofran.  I will discharge to follow up with Cardiology.  I will treat with meclizine and Zofran.        Scribe Attestation:   Scribe #1: I performed the above scribed service and the documentation accurately describes the services I performed. I attest to the accuracy of the note.                           Please note that the documentation on this chart was provided by the scribe above on the date of service noted above, and that the documentation in the chart accurately reflects the work and decisions made by me alone.  Signed, Dr. Fields    Clinical Impression:  Final diagnoses:  [R55] Syncope  [H81.399] Peripheral vertigo, unspecified laterality (Primary)          ED Disposition Condition    Discharge Stable          ED Prescriptions       Medication Sig Dispense Start Date End Date Auth. Provider    meclizine (ANTIVERT) 25 mg tablet Take 1 tablet (25 mg total) by mouth every 6 (six) hours as needed. 20 tablet 3/30/2025 -- Padreep Fields MD    ondansetron (ZOFRAN) 4 MG tablet Take 1 tablet (4 mg total) by mouth every 8 (eight) hours as needed. 12 tablet 3/30/2025 -- Pardeep Fields MD          Follow-up Information       Follow up With Specialties Details Why Contact Info    Hilda Alvares MD Cardiology, Interventional Cardiology In 1 week  [address]  [address]  [facility] [id number] [phone]                 Pardeep Fields,  MD  03/30/25 1453         [1]   Social History  Tobacco Use    Smoking status: Never    Smokeless tobacco: Never   Substance Use Topics    Alcohol use: No     Alcohol/week: 0.0 standard drinks of alcohol    Drug use: No        Pardeep Fields MD  03/30/25 2130

## 2025-03-30 NOTE — DISCHARGE INSTRUCTIONS
Zofran for nausea.  Meclizine for vertigo.  Return immediately if you get worse or if new problems develop.  Please follow up with the cardiologist above this week for evaluation of your syncope.  You may also follow up with the primary care doctor.  Continue your usual medicines.  Rest.

## 2025-03-30 NOTE — ED TRIAGE NOTES
Pt BIB by  ems.  Per EMS, patient was sitting in [place] when daughter noticed pt laid her head on daughter's shoulder.  Daughter thought pt may have passed out.  Pt then ezio to bathroom and and an episode of vomiting.  IV placed and was given 4 mg zofran and about 100 ml of NS.  Hx: hypothyroidism, vertigo.

## 2025-03-31 LAB
OHS QRS DURATION: 80 MS
OHS QTC CALCULATION: 459 MS

## 2025-06-06 ENCOUNTER — OFFICE VISIT (OUTPATIENT)
Dept: OTOLARYNGOLOGY | Facility: CLINIC | Age: 85
End: 2025-06-06
Payer: MEDICARE

## 2025-06-06 VITALS — HEART RATE: 60 BPM | SYSTOLIC BLOOD PRESSURE: 136 MMHG | DIASTOLIC BLOOD PRESSURE: 76 MMHG

## 2025-06-06 DIAGNOSIS — H61.23 BILATERAL IMPACTED CERUMEN: Primary | ICD-10-CM

## 2025-06-06 PROCEDURE — 99999 PR PBB SHADOW E&M-EST. PATIENT-LVL III: CPT | Mod: PBBFAC,HCNC,, | Performed by: NURSE PRACTITIONER

## 2025-06-20 ENCOUNTER — CLINICAL SUPPORT (OUTPATIENT)
Dept: OTOLARYNGOLOGY | Facility: CLINIC | Age: 85
End: 2025-06-20
Payer: MEDICARE

## 2025-06-20 DIAGNOSIS — H90.3 SENSORINEURAL HEARING LOSS (SNHL) OF BOTH EARS: Primary | ICD-10-CM

## 2025-06-20 NOTE — PROGRESS NOTES
Joanne Daniel, a 85 y.o. female was seen today in the clinic for an audiologic evaluation. The patient's primary complaint was reduced hearing perception (understanding speech in presence of background noise).  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 Speech reception thresholds were obtained at 30 dBHL in the right ear and 30 dBHL in the left ear. Speech discrimination scores were 100% in the right ear and 100% in the left ear. Tympanometry revealed Type A tympanograms in both ears.     Results were reviewed with the patient and the recommendation of a hearing aid consultation was discussed.    Recommendations:  Otologic evaluation  Annual audiologic evaluation  Hearing protection in noise  Hearing aid consultation